# Patient Record
Sex: FEMALE | Race: WHITE | Employment: FULL TIME | URBAN - METROPOLITAN AREA
[De-identification: names, ages, dates, MRNs, and addresses within clinical notes are randomized per-mention and may not be internally consistent; named-entity substitution may affect disease eponyms.]

---

## 2017-02-22 ENCOUNTER — TELEPHONE (OUTPATIENT)
Dept: OBGYN CLINIC | Facility: CLINIC | Age: 32
End: 2017-02-22

## 2017-02-22 NOTE — TELEPHONE ENCOUNTER
Pt confirms message below and LMP of 1/19/17. Pt stated she is not currently taking PNV and pt was instructed to get PNV with [facility]. Pt informed our office has 6 doctors (2 male, 4 female) and pt will see all of them throughout PN care.  Pt verbalized underst

## 2017-03-07 ENCOUNTER — TELEPHONE (OUTPATIENT)
Dept: OBGYN CLINIC | Facility: CLINIC | Age: 32
End: 2017-03-07

## 2017-03-07 NOTE — TELEPHONE ENCOUNTER
Pt has appts for dental work coming up. Letter for dental work in pregnancy sent to pt via [address] Pt verbalizes understanding.

## 2017-03-18 ENCOUNTER — LAB ENCOUNTER (OUTPATIENT)
Dept: LAB | Facility: HOSPITAL | Age: 32
End: 2017-03-18
Attending: OBSTETRICS & GYNECOLOGY
Payer: COMMERCIAL

## 2017-03-18 ENCOUNTER — NURSE ONLY (OUTPATIENT)
Dept: OBGYN CLINIC | Facility: CLINIC | Age: 32
End: 2017-03-18

## 2017-03-18 ENCOUNTER — TELEPHONE (OUTPATIENT)
Dept: OBGYN CLINIC | Facility: CLINIC | Age: 32
End: 2017-03-18

## 2017-03-18 VITALS — HEIGHT: 67.5 IN | BODY MASS INDEX: 29.63 KG/M2 | WEIGHT: 191 LBS

## 2017-03-18 DIAGNOSIS — Z34.81 ENCOUNTER FOR SUPERVISION OF OTHER NORMAL PREGNANCY IN FIRST TRIMESTER: ICD-10-CM

## 2017-03-18 DIAGNOSIS — Z34.81 ENCOUNTER FOR SUPERVISION OF OTHER NORMAL PREGNANCY IN FIRST TRIMESTER: Primary | ICD-10-CM

## 2017-03-18 LAB
ANTIBODY SCREEN: NEGATIVE
BASOPHILS # BLD: 0 K/UL (ref 0–0.2)
BASOPHILS NFR BLD: 1 %
CONTROL LINE PRESENT WITH A CLEAR BACKGROUND (YES/NO): YES YES/NO
EOSINOPHIL # BLD: 0.1 K/UL (ref 0–0.7)
EOSINOPHIL NFR BLD: 1 %
ERYTHROCYTE [DISTWIDTH] IN BLOOD BY AUTOMATED COUNT: 12.5 % (ref 11–15)
HCT VFR BLD AUTO: 37.3 % (ref 35–48)
HGB BLD-MCNC: 12.5 G/DL (ref 12–16)
KIT EXPIRATION DATE: NORMAL DATE
KIT LOT #: NORMAL NUMERIC
LYMPHOCYTES # BLD: 1.7 K/UL (ref 1–4)
LYMPHOCYTES NFR BLD: 27 %
MCH RBC QN AUTO: 30.9 PG (ref 27–32)
MCHC RBC AUTO-ENTMCNC: 33.6 G/DL (ref 32–37)
MCV RBC AUTO: 92 FL (ref 80–100)
MONOCYTES # BLD: 0.6 K/UL (ref 0–1)
MONOCYTES NFR BLD: 10 %
NEUTROPHILS # BLD AUTO: 3.9 K/UL (ref 1.8–7.7)
NEUTROPHILS NFR BLD: 61 %
PLATELET # BLD AUTO: 214 K/UL (ref 140–400)
PMV BLD AUTO: 9.2 FL (ref 7.4–10.3)
PREGNANCY TEST, URINE: POSITIVE
RBC # BLD AUTO: 4.06 M/UL (ref 3.7–5.4)
RH BLOOD TYPE: POSITIVE
RUBV IGG SER-ACNC: 12.2 IU/ML
WBC # BLD AUTO: 6.4 K/UL (ref 4–11)

## 2017-03-18 PROCEDURE — 86900 BLOOD TYPING SEROLOGIC ABO: CPT

## 2017-03-18 PROCEDURE — 86803 HEPATITIS C AB TEST: CPT

## 2017-03-18 PROCEDURE — 86780 TREPONEMA PALLIDUM: CPT

## 2017-03-18 PROCEDURE — 81025 URINE PREGNANCY TEST: CPT | Performed by: OBSTETRICS & GYNECOLOGY

## 2017-03-18 PROCEDURE — 87340 HEPATITIS B SURFACE AG IA: CPT

## 2017-03-18 PROCEDURE — 87086 URINE CULTURE/COLONY COUNT: CPT

## 2017-03-18 PROCEDURE — 86762 RUBELLA ANTIBODY: CPT

## 2017-03-18 PROCEDURE — 87389 HIV-1 AG W/HIV-1&-2 AB AG IA: CPT

## 2017-03-18 PROCEDURE — 85025 COMPLETE CBC W/AUTO DIFF WBC: CPT

## 2017-03-18 PROCEDURE — 86850 RBC ANTIBODY SCREEN: CPT

## 2017-03-18 PROCEDURE — 86901 BLOOD TYPING SEROLOGIC RH(D): CPT

## 2017-03-18 PROCEDURE — 36415 COLL VENOUS BLD VENIPUNCTURE: CPT

## 2017-03-18 RX ORDER — ACETAMINOPHEN 160 MG
2000 TABLET,DISINTEGRATING ORAL DAILY
COMMUNITY
End: 2017-09-18 | Stop reason: ALTCHOICE

## 2017-03-18 NOTE — PROGRESS NOTES
Pt seen for OBN appt today with no complaints. Normal PN labs ordered and Hep C. Pt advised all labs must be completed and resulted prior to MD appt. Pt walked to  to schedule NPN appt with MD.  Pt did upt in the office and it was positive. or baby's father had a child with birth defects not listed above No    Previous miscarriages or stillborn  Pt's mother had miscarriages,  Not sure of the number

## 2017-03-18 NOTE — TELEPHONE ENCOUNTER
Pt is currently taking Vitamin D3 2000 units daily. Pt wants to know if she can continue taking it. Pt is also taking a pnv.   Sent to MD on Call, JONH

## 2017-03-20 ENCOUNTER — TELEPHONE (OUTPATIENT)
Dept: OBGYN CLINIC | Facility: CLINIC | Age: 32
End: 2017-03-20

## 2017-03-20 ENCOUNTER — INITIAL PRENATAL (OUTPATIENT)
Dept: OBGYN CLINIC | Facility: CLINIC | Age: 32
End: 2017-03-20

## 2017-03-20 VITALS
BODY MASS INDEX: 30 KG/M2 | DIASTOLIC BLOOD PRESSURE: 64 MMHG | HEART RATE: 68 BPM | SYSTOLIC BLOOD PRESSURE: 105 MMHG | WEIGHT: 192 LBS

## 2017-03-20 DIAGNOSIS — Z34.91 ENCOUNTER FOR SUPERVISION OF NORMAL PREGNANCY IN FIRST TRIMESTER, UNSPECIFIED GRAVIDITY: Primary | ICD-10-CM

## 2017-03-20 DIAGNOSIS — O34.41 HISTORY OF LEEP (LOOP ELECTROSURGICAL EXCISION PROCEDURE) OF CERVIX COMPLICATING PREGNANCY, FIRST TRIMESTER: ICD-10-CM

## 2017-03-20 DIAGNOSIS — Z12.4 SCREENING FOR MALIGNANT NEOPLASM OF CERVIX: ICD-10-CM

## 2017-03-20 DIAGNOSIS — Z98.890 HISTORY OF LEEP (LOOP ELECTROSURGICAL EXCISION PROCEDURE) OF CERVIX COMPLICATING PREGNANCY, FIRST TRIMESTER: ICD-10-CM

## 2017-03-20 DIAGNOSIS — Z36.9 FIRST TRIMESTER SCREENING: Primary | ICD-10-CM

## 2017-03-20 PROBLEM — Z87.891 FORMER SMOKER: Status: ACTIVE | Noted: 2017-03-20

## 2017-03-20 PROBLEM — Z28.39 RUBELLA NON-IMMUNE STATUS, ANTEPARTUM (HCC): Status: ACTIVE | Noted: 2017-03-20

## 2017-03-20 PROBLEM — Z13.79 GENETIC TESTING: Status: ACTIVE | Noted: 2017-03-20

## 2017-03-20 PROBLEM — Z28.3 RUBELLA NON-IMMUNE STATUS, ANTEPARTUM: Status: ACTIVE | Noted: 2017-03-20

## 2017-03-20 PROBLEM — Z28.39 RUBELLA NON-IMMUNE STATUS, ANTEPARTUM: Status: ACTIVE | Noted: 2017-03-20

## 2017-03-20 PROBLEM — O99.891 RUBELLA NON-IMMUNE STATUS, ANTEPARTUM: Status: ACTIVE | Noted: 2017-03-20

## 2017-03-20 PROBLEM — O09.899 RUBELLA NON-IMMUNE STATUS, ANTEPARTUM (HCC): Status: ACTIVE | Noted: 2017-03-20

## 2017-03-20 PROBLEM — O09.899 RUBELLA NON-IMMUNE STATUS, ANTEPARTUM: Status: ACTIVE | Noted: 2017-03-20

## 2017-03-20 LAB
HBV SURFACE AG SERPL QL IA: NONREACTIVE
HCV AB SERPL QL IA: NONREACTIVE
HIV1+2 AB SERPL QL IA: NONREACTIVE
MULTISTIX LOT#: NORMAL NUMERIC
PH, URINE: 6.5 (ref 4.5–8)
SPECIFIC GRAVITY: 1.01 (ref 1–1.03)
T PALLIDUM AB SER QL: NEGATIVE
UROBILINOGEN,SEMI-QN: 0.2 MG/DL (ref 0–1.9)

## 2017-03-20 NOTE — PROGRESS NOTES
Pt is a 31 yo  @ 8w4d by [facility] who presents for NOB visit. History taken and Problem List updated. History of LEEP in  for CIS. Subsequent pap wnl. PE performed and wnl. Pap, GC, CT, Trich collected.  Stopped smoking cigs once she found out sh

## 2017-03-20 NOTE — TELEPHONE ENCOUNTER
PT NOTIFIED THE REQUEST HAS BEEN FAXED TO [facility] AND PHONE NUMBER [phone] TO SCHEDULE HER APPTS. REFERRAL IN PROCESS.

## 2017-03-21 ENCOUNTER — TELEPHONE (OUTPATIENT)
Dept: PERINATAL CARE | Facility: HOSPITAL | Age: 32
End: 2017-03-21

## 2017-03-22 LAB
C TRACH DNA SPEC QL NAA+PROBE: NEGATIVE
HPV I/H RISK 1 DNA SPEC QL NAA+PROBE: NEGATIVE
N GONORRHOEA DNA SPEC QL NAA+PROBE: NEGATIVE
T VAGINALIS RRNA SPEC QL NAA+PROBE: NEGATIVE

## 2017-04-17 ENCOUNTER — ROUTINE PRENATAL (OUTPATIENT)
Dept: OBGYN CLINIC | Facility: CLINIC | Age: 32
End: 2017-04-17

## 2017-04-17 VITALS
HEART RATE: 69 BPM | SYSTOLIC BLOOD PRESSURE: 125 MMHG | BODY MASS INDEX: 30 KG/M2 | DIASTOLIC BLOOD PRESSURE: 76 MMHG | WEIGHT: 194 LBS

## 2017-04-17 DIAGNOSIS — O34.42 HX LEEP (LOOP ELECTROSURGICAL EXCISION PROCEDURE), CERVIX, PREGNANCY, SECOND TRIMESTER: ICD-10-CM

## 2017-04-17 DIAGNOSIS — Z34.91 ENCOUNTER FOR SUPERVISION OF NORMAL PREGNANCY IN FIRST TRIMESTER, UNSPECIFIED GRAVIDITY: Primary | ICD-10-CM

## 2017-04-17 DIAGNOSIS — Z98.890 HX LEEP (LOOP ELECTROSURGICAL EXCISION PROCEDURE), CERVIX, PREGNANCY, SECOND TRIMESTER: ICD-10-CM

## 2017-04-24 ENCOUNTER — HOSPITAL ENCOUNTER (OUTPATIENT)
Dept: PERINATAL CARE | Facility: HOSPITAL | Age: 32
Discharge: HOME | End: 2017-04-24
Attending: OBSTETRICS & GYNECOLOGY
Payer: COMMERCIAL

## 2017-04-24 VITALS
BODY MASS INDEX: 30.45 KG/M2 | HEART RATE: 68 BPM | DIASTOLIC BLOOD PRESSURE: 71 MMHG | HEIGHT: 67 IN | RESPIRATION RATE: 16 BRPM | WEIGHT: 194 LBS | SYSTOLIC BLOOD PRESSURE: 127 MMHG

## 2017-04-24 DIAGNOSIS — D25.2 SUBSEROUS LEIOMYOMA OF UTERUS: ICD-10-CM

## 2017-04-24 DIAGNOSIS — Z36.9 FIRST TRIMESTER SCREENING: Primary | ICD-10-CM

## 2017-04-24 DIAGNOSIS — Z98.890 H/O LEEP: ICD-10-CM

## 2017-04-24 DIAGNOSIS — Z36.9 FIRST TRIMESTER SCREENING: ICD-10-CM

## 2017-04-24 PROCEDURE — 76813 OB US NUCHAL MEAS 1 GEST: CPT | Performed by: OBSTETRICS & GYNECOLOGY

## 2017-04-24 PROCEDURE — 76813 OB US NUCHAL MEAS 1 GEST: CPT

## 2017-04-24 PROCEDURE — 99242 OFF/OP CONSLTJ NEW/EST SF 20: CPT | Performed by: OBSTETRICS & GYNECOLOGY

## 2017-04-24 NOTE — PROGRESS NOTES
[facility] first trimester screen and ultrasound  Lot # [id number] for lab specimen  Patient feeling well, no complaints offered

## 2017-04-24 NOTE — PROGRESS NOTES
Outpatient Maternal-Fetal Medicine Consultation    Dear Dr. Chase Krueger,    Thank you for requesting ultrasound evaluation and maternal fetal medicine consultation on your patient Aurea Primrose.   As you are aware she is a 32year old female with a [place] ABSOB, RUBIGGOB, OBHBSAG, RPROB, HIV, GBS    OBSTETRIC ULTRASOUND  A first trimester ultrasound was performed prior to the consultation which I interpreted and discussed with the patient and her , Maciej Mann.   The CRL is consistent with dates and the NT length of 25 mm or less in the second trimester is associated with a higher risk for  delivery and the shorter the cervix the higher the risk.   Transvaginal ultrasound cervical length assessment is the best method for assessing cervical length, alth M.D.    The majority of the time (>50%) was spent in review of records, consultation and coordination of care. Our discussion is summarized above. The approximate physician face-to-face time was 35 minutes.

## 2017-04-26 ENCOUNTER — TELEPHONE (OUTPATIENT)
Dept: PERINATAL CARE | Facility: HOSPITAL | Age: 32
End: 2017-04-26

## 2017-04-26 NOTE — TELEPHONE ENCOUNTER
Pt 1st trimester screen results obt  Reviewed By MD Traci Bill  Low risk for trisomy 18/13/21  Less than 1 in 91647 risk for trisomy 18/13/21  lmovm per pt consent    Report sent for scanning into pt record

## 2017-05-22 ENCOUNTER — ROUTINE PRENATAL (OUTPATIENT)
Dept: OBGYN CLINIC | Facility: CLINIC | Age: 32
End: 2017-05-22

## 2017-05-22 ENCOUNTER — HOSPITAL ENCOUNTER (OUTPATIENT)
Dept: PERINATAL CARE | Facility: HOSPITAL | Age: 32
Discharge: HOME | End: 2017-05-22
Attending: OBSTETRICS & GYNECOLOGY
Payer: COMMERCIAL

## 2017-05-22 VITALS
DIASTOLIC BLOOD PRESSURE: 75 MMHG | WEIGHT: 195 LBS | RESPIRATION RATE: 16 BRPM | SYSTOLIC BLOOD PRESSURE: 118 MMHG | BODY MASS INDEX: 31 KG/M2 | HEART RATE: 71 BPM

## 2017-05-22 VITALS
HEART RATE: 68 BPM | DIASTOLIC BLOOD PRESSURE: 73 MMHG | BODY MASS INDEX: 31 KG/M2 | WEIGHT: 200 LBS | SYSTOLIC BLOOD PRESSURE: 119 MMHG

## 2017-05-22 DIAGNOSIS — Z34.02 ENCOUNTER FOR SUPERVISION OF NORMAL FIRST PREGNANCY IN SECOND TRIMESTER: Primary | ICD-10-CM

## 2017-05-22 DIAGNOSIS — Z98.890 H/O LEEP (LOOP ELECTROSURGICAL EXCISION PROCEDURE) OF CERVIX COMPLICATING PREGNANCY, SECOND TRIMESTER: ICD-10-CM

## 2017-05-22 DIAGNOSIS — O34.42 H/O LEEP (LOOP ELECTROSURGICAL EXCISION PROCEDURE) OF CERVIX COMPLICATING PREGNANCY, SECOND TRIMESTER: ICD-10-CM

## 2017-05-22 DIAGNOSIS — O34.42 H/O LEEP (LOOP ELECTROSURGICAL EXCISION PROCEDURE) OF CERVIX COMPLICATING PREGNANCY, SECOND TRIMESTER: Primary | ICD-10-CM

## 2017-05-22 DIAGNOSIS — Z98.890 H/O LEEP (LOOP ELECTROSURGICAL EXCISION PROCEDURE) OF CERVIX COMPLICATING PREGNANCY, SECOND TRIMESTER: Primary | ICD-10-CM

## 2017-05-22 PROCEDURE — 76815 OB US LIMITED FETUS(S): CPT | Performed by: OBSTETRICS & GYNECOLOGY

## 2017-05-22 PROCEDURE — 76817 TRANSVAGINAL US OBSTETRIC: CPT | Performed by: OBSTETRICS & GYNECOLOGY

## 2017-05-22 PROCEDURE — 99212 OFFICE O/P EST SF 10 MIN: CPT | Performed by: OBSTETRICS & GYNECOLOGY

## 2017-05-22 NOTE — PROGRESS NOTES
Outpatient Maternal-Fetal Medicine Consultation    Dear Dr. Patel Hernandez,    Thank you for requesting ultrasound evaluation and maternal fetal medicine consultation on your patient Ld Davison As you are aware she is a 32year old female with a [place]    I informed her of the fibroid.  Fibroids can increase in size during pregnancy and could lead to abdominal pain with an increased risk of  labor.  Most cases are unremarkable.  Fibroids can obstruct the pelvis and increase the risk for a C-sectio their satisfaction.       IMPRESSION:  · IUP at 17w4d  · Normal cervical length ultrasound  · Small left lateral subserosal fibroid (2 cm), unchanged  · H/o LEEP in 2015    RECOMMENDATIONS:  · Continue care with Dr. Liz Hunter  · Repeat transvaginal CL at ~2

## 2017-05-22 NOTE — ADDENDUM NOTE
Encounter addended by: Marcelino Hutson on: 5/22/2017 11:06 AM<BR>     Documentation filed: Charges VN

## 2017-06-12 ENCOUNTER — HOSPITAL ENCOUNTER (OUTPATIENT)
Dept: PERINATAL CARE | Facility: HOSPITAL | Age: 32
Discharge: HOME | End: 2017-06-12
Attending: OBSTETRICS & GYNECOLOGY
Payer: COMMERCIAL

## 2017-06-12 VITALS — SYSTOLIC BLOOD PRESSURE: 138 MMHG | DIASTOLIC BLOOD PRESSURE: 74 MMHG | HEART RATE: 81 BPM

## 2017-06-12 DIAGNOSIS — Z98.890 H/O LEEP: Primary | ICD-10-CM

## 2017-06-12 DIAGNOSIS — Z36.3 SCREENING, ANTENATAL, FOR MALFORMATION BY ULTRASOUND: ICD-10-CM

## 2017-06-12 DIAGNOSIS — D25.2 SUBSEROUS LEIOMYOMA OF UTERUS: ICD-10-CM

## 2017-06-12 DIAGNOSIS — Z98.890 H/O LEEP: ICD-10-CM

## 2017-06-12 PROCEDURE — 76811 OB US DETAILED SNGL FETUS: CPT | Performed by: OBSTETRICS & GYNECOLOGY

## 2017-06-12 PROCEDURE — 76805 OB US >/= 14 WKS SNGL FETUS: CPT | Performed by: OBSTETRICS & GYNECOLOGY

## 2017-06-12 PROCEDURE — 76817 TRANSVAGINAL US OBSTETRIC: CPT | Performed by: OBSTETRICS & GYNECOLOGY

## 2017-06-12 PROCEDURE — 99212 OFFICE O/P EST SF 10 MIN: CPT | Performed by: OBSTETRICS & GYNECOLOGY

## 2017-06-12 NOTE — PROGRESS NOTES
Outpatient Maternal-Fetal Medicine Consultation    Dear Dr. Hayden Montano,    Thank you for requesting ultrasound evaluation and maternal fetal medicine consultation on your patient Theresa Rich As you are aware she is a 32year old female with a [place] present. Choroid plexus cyst absent. Summary of Ultrasound findings: This is a [place] pregnancy   The fetal measurements are consistent with established EDC. No gross ultrasound evidence of structural abnormalities are seen today.  No minor markers

## 2017-06-20 ENCOUNTER — ROUTINE PRENATAL (OUTPATIENT)
Dept: OBGYN CLINIC | Facility: CLINIC | Age: 32
End: 2017-06-20

## 2017-06-20 VITALS
HEART RATE: 69 BPM | DIASTOLIC BLOOD PRESSURE: 70 MMHG | SYSTOLIC BLOOD PRESSURE: 114 MMHG | BODY MASS INDEX: 32 KG/M2 | WEIGHT: 202 LBS

## 2017-06-20 DIAGNOSIS — Z34.92 ENCOUNTER FOR SUPERVISION OF NORMAL PREGNANCY IN SECOND TRIMESTER, UNSPECIFIED GRAVIDITY: Primary | ICD-10-CM

## 2017-07-19 ENCOUNTER — TELEPHONE (OUTPATIENT)
Dept: RHEUMATOLOGY | Facility: CLINIC | Age: 32
End: 2017-07-19

## 2017-07-19 ENCOUNTER — ROUTINE PRENATAL (OUTPATIENT)
Dept: OBGYN CLINIC | Facility: CLINIC | Age: 32
End: 2017-07-19

## 2017-07-19 VITALS
DIASTOLIC BLOOD PRESSURE: 70 MMHG | SYSTOLIC BLOOD PRESSURE: 106 MMHG | BODY MASS INDEX: 33 KG/M2 | WEIGHT: 209 LBS | HEART RATE: 71 BPM

## 2017-07-19 DIAGNOSIS — L98.9 SKIN LESIONS: Primary | ICD-10-CM

## 2017-07-19 DIAGNOSIS — Z34.02 ENCOUNTER FOR SUPERVISION OF NORMAL FIRST PREGNANCY IN SECOND TRIMESTER: Primary | ICD-10-CM

## 2017-07-19 LAB
APPEARANCE: CLEAR
MULTISTIX LOT#: NORMAL NUMERIC
URINE-COLOR: YELLOW

## 2017-07-19 NOTE — TELEPHONE ENCOUNTER
Pt stopped on to [facility] asking for a referral to see a Dermatologist. Please advise

## 2017-07-20 ENCOUNTER — ROUTINE PRENATAL (OUTPATIENT)
Dept: OBGYN CLINIC | Facility: CLINIC | Age: 32
End: 2017-07-20

## 2017-07-20 ENCOUNTER — HOSPITAL ENCOUNTER (OUTPATIENT)
Facility: HOSPITAL | Age: 32
Setting detail: OBSERVATION
Discharge: HOME | End: 2017-07-20
Attending: OBSTETRICS & GYNECOLOGY | Admitting: OBSTETRICS & GYNECOLOGY
Payer: COMMERCIAL

## 2017-07-20 ENCOUNTER — TELEPHONE (OUTPATIENT)
Dept: OBGYN CLINIC | Facility: CLINIC | Age: 32
End: 2017-07-20

## 2017-07-20 VITALS
HEART RATE: 73 BPM | DIASTOLIC BLOOD PRESSURE: 73 MMHG | SYSTOLIC BLOOD PRESSURE: 109 MMHG | BODY MASS INDEX: 33 KG/M2 | WEIGHT: 208.19 LBS

## 2017-07-20 VITALS — DIASTOLIC BLOOD PRESSURE: 71 MMHG | SYSTOLIC BLOOD PRESSURE: 115 MMHG | HEART RATE: 74 BPM

## 2017-07-20 DIAGNOSIS — Z34.92 ENCOUNTER FOR SUPERVISION OF NORMAL PREGNANCY IN SECOND TRIMESTER, UNSPECIFIED GRAVIDITY: Primary | ICD-10-CM

## 2017-07-20 PROBLEM — O47.9 IRREGULAR CONTRACTIONS (HCC): Status: ACTIVE | Noted: 2017-07-20

## 2017-07-20 PROBLEM — O47.9 IRREGULAR CONTRACTIONS: Status: ACTIVE | Noted: 2017-07-20

## 2017-07-20 LAB
BILIRUB UR QL: NEGATIVE
COLOR UR: YELLOW
FIBRONECTIN FETAL SPEC QL: NEGATIVE
GLUCOSE UR-MCNC: NEGATIVE MG/DL
HGB UR QL STRIP.AUTO: NEGATIVE
KETONES UR-MCNC: NEGATIVE MG/DL
MULTISTIX LOT#: NORMAL NUMERIC
NITRITE UR QL STRIP.AUTO: NEGATIVE
PH UR: 5 [PH] (ref 5–8)
PH, URINE: 6 (ref 4.5–8)
PROT UR-MCNC: NEGATIVE MG/DL
RBC #/AREA URNS AUTO: 2 /HPF
SP GR UR STRIP: 1.01 (ref 1–1.03)
SPECIFIC GRAVITY: 1.01 (ref 1–1.03)
UROBILINOGEN UR STRIP-ACNC: <2
UROBILINOGEN,SEMI-QN: 0.2 MG/DL (ref 0–1.9)
VIT C UR-MCNC: NEGATIVE MG/DL
WBC #/AREA URNS AUTO: 12 /HPF

## 2017-07-20 PROCEDURE — 59025 FETAL NON-STRESS TEST: CPT | Performed by: OBSTETRICS & GYNECOLOGY

## 2017-07-20 RX ORDER — NITROFURANTOIN 25; 75 MG/1; MG/1
100 CAPSULE ORAL 2 TIMES DAILY
Qty: 14 CAPSULE | Refills: 0 | Status: SHIPPED | OUTPATIENT
Start: 2017-07-20 | End: 2017-07-27

## 2017-07-20 NOTE — TELEPHONE ENCOUNTER
C/O LOWER ABDOMINAL INTERMITTENT CRAMPING THAT MAKES HER LOW BACK HURT. DESCRIBES IT AS LOWER LEFT ABDOMINAL TIGHTENING/PULSING SENSATION. SYMPTOMS FOR 3 DAYS. HAS BEEN DRINKING A LOT OF WATER AND DOING SOME LOW BACK STRETCHES. PT CONCERNED.   OFFERED A

## 2017-07-20 NOTE — TRIAGE
[facility]      Triage Note    Soha Mckeon Patient Status:  Observation    1985 MRN [id number]   Location [address]  Attending Melina Montaño MD   Hosp Day # 0 PCP MD Andres Galvez Ask gestational age                      Additional Comments       Reason for visit: Sent from office with c/o uterine cramping the past 2-3 days. UA, C&S, FFN done. FFN negative. UA indicative of possible UTI. Dr Foster Parker informed of EFM tracing, FFN, UA results.

## 2017-07-20 NOTE — PROGRESS NOTES
Problem Visit--Pt c/o persistent contractions x 3 days. Feeling them throughout the day and having low back pain and pressure. FFN collected via spec exam.  Cx 0/50/-3 however has had a LEEP. To [facility] to r/o PTL. On call notified.

## 2017-07-29 ENCOUNTER — APPOINTMENT (OUTPATIENT)
Dept: LAB | Age: 32
End: 2017-07-29
Attending: OBSTETRICS & GYNECOLOGY
Payer: COMMERCIAL

## 2017-07-29 DIAGNOSIS — Z34.02 ENCOUNTER FOR SUPERVISION OF NORMAL FIRST PREGNANCY IN SECOND TRIMESTER: ICD-10-CM

## 2017-07-29 LAB
ERYTHROCYTE [DISTWIDTH] IN BLOOD BY AUTOMATED COUNT: 13.7 % (ref 11–15)
GLUCOSE 1H P 50 G GLC PO SERPL-MCNC: 88 MG/DL
HCT VFR BLD AUTO: 34.8 % (ref 35–48)
HGB BLD-MCNC: 11.6 G/DL (ref 12–16)
MCH RBC QN AUTO: 30.8 PG (ref 27–32)
MCHC RBC AUTO-ENTMCNC: 33.3 G/DL (ref 32–37)
MCV RBC AUTO: 92.4 FL (ref 80–100)
PLATELET # BLD AUTO: 212 K/UL (ref 140–400)
PMV BLD AUTO: 10 FL (ref 7.4–10.3)
RBC # BLD AUTO: 3.76 M/UL (ref 3.7–5.4)
WBC # BLD AUTO: 8.6 K/UL (ref 4–11)

## 2017-07-29 PROCEDURE — 82950 GLUCOSE TEST: CPT

## 2017-07-29 PROCEDURE — 36415 COLL VENOUS BLD VENIPUNCTURE: CPT

## 2017-07-29 PROCEDURE — 85027 COMPLETE CBC AUTOMATED: CPT

## 2017-07-31 ENCOUNTER — TELEPHONE (OUTPATIENT)
Dept: OBGYN CLINIC | Facility: CLINIC | Age: 32
End: 2017-07-31

## 2017-08-08 ENCOUNTER — ROUTINE PRENATAL (OUTPATIENT)
Dept: OBGYN CLINIC | Facility: CLINIC | Age: 32
End: 2017-08-08

## 2017-08-08 VITALS
SYSTOLIC BLOOD PRESSURE: 111 MMHG | HEART RATE: 83 BPM | BODY MASS INDEX: 33 KG/M2 | DIASTOLIC BLOOD PRESSURE: 69 MMHG | WEIGHT: 213 LBS

## 2017-08-08 DIAGNOSIS — Z34.93 ENCOUNTER FOR SUPERVISION OF NORMAL PREGNANCY IN THIRD TRIMESTER, UNSPECIFIED GRAVIDITY: Primary | ICD-10-CM

## 2017-08-08 LAB
MULTISTIX LOT#: NORMAL NUMERIC
PH, URINE: 6 (ref 4.5–8)
SPECIFIC GRAVITY: 1.02 (ref 1–1.03)

## 2017-08-08 PROCEDURE — 90471 IMMUNIZATION ADMIN: CPT | Performed by: OBSTETRICS & GYNECOLOGY

## 2017-08-08 PROCEDURE — 90715 TDAP VACCINE 7 YRS/> IM: CPT | Performed by: OBSTETRICS & GYNECOLOGY

## 2017-08-08 NOTE — PROGRESS NOTES
Bairon Roper at visit. No S/S of PTL. TDAP today. Trouble scheduling tour by phone or web site. We did it with Tyrell Rock over the phone.

## 2017-08-23 ENCOUNTER — ROUTINE PRENATAL (OUTPATIENT)
Dept: OBGYN CLINIC | Facility: CLINIC | Age: 32
End: 2017-08-23

## 2017-08-23 VITALS
WEIGHT: 217 LBS | DIASTOLIC BLOOD PRESSURE: 74 MMHG | SYSTOLIC BLOOD PRESSURE: 110 MMHG | HEART RATE: 80 BPM | BODY MASS INDEX: 34 KG/M2

## 2017-08-23 DIAGNOSIS — Z34.93 ENCOUNTER FOR SUPERVISION OF NORMAL PREGNANCY IN THIRD TRIMESTER, UNSPECIFIED GRAVIDITY: Primary | ICD-10-CM

## 2017-08-23 LAB
MULTISTIX LOT#: NORMAL NUMERIC
PH, URINE: 5 (ref 4.5–8)
SPECIFIC GRAVITY: 1.02 (ref 1–1.03)

## 2017-09-07 ENCOUNTER — ROUTINE PRENATAL (OUTPATIENT)
Dept: OBGYN CLINIC | Facility: CLINIC | Age: 32
End: 2017-09-07

## 2017-09-07 VITALS
WEIGHT: 218.81 LBS | SYSTOLIC BLOOD PRESSURE: 117 MMHG | BODY MASS INDEX: 34 KG/M2 | DIASTOLIC BLOOD PRESSURE: 80 MMHG | HEART RATE: 74 BPM

## 2017-09-07 DIAGNOSIS — Z34.93 ENCOUNTER FOR SUPERVISION OF NORMAL PREGNANCY IN THIRD TRIMESTER, UNSPECIFIED GRAVIDITY: Primary | ICD-10-CM

## 2017-09-07 LAB
MULTISTIX LOT#: NORMAL NUMERIC
PH, URINE: 6 (ref 4.5–8)
SPECIFIC GRAVITY: 1.01 (ref 1–1.03)
UROBILINOGEN,SEMI-QN: 0 MG/DL (ref 0–1.9)

## 2017-09-16 ENCOUNTER — TELEPHONE (OUTPATIENT)
Dept: OBGYN CLINIC | Facility: CLINIC | Age: 32
End: 2017-09-16

## 2017-09-16 NOTE — TELEPHONE ENCOUNTER
Pt is 34w2d, calling to report a sore throat and HA that started Wednesday night. Pt states she felt warm Thursday but had no thermometer. She bought one and took her temp today which was 97.9F. Pt denies any other symptoms.  Pt reports +FM and denies LOF a

## 2017-09-18 ENCOUNTER — OFFICE VISIT (OUTPATIENT)
Dept: INTERNAL MEDICINE CLINIC | Facility: CLINIC | Age: 32
End: 2017-09-18

## 2017-09-18 VITALS
DIASTOLIC BLOOD PRESSURE: 80 MMHG | TEMPERATURE: 98 F | SYSTOLIC BLOOD PRESSURE: 124 MMHG | WEIGHT: 219 LBS | BODY MASS INDEX: 33.97 KG/M2 | OXYGEN SATURATION: 97 % | HEART RATE: 83 BPM | HEIGHT: 67.5 IN

## 2017-09-18 DIAGNOSIS — J06.9 ACUTE URI: Primary | ICD-10-CM

## 2017-09-18 LAB
CONTROL LINE PRESENT WITH A CLEAR BACKGROUND (YES/NO): YES YES/NO
KIT LOT #: NORMAL NUMERIC
STREP GRP A CUL-SCR: NEGATIVE

## 2017-09-18 PROCEDURE — 99213 OFFICE O/P EST LOW 20 MIN: CPT | Performed by: INTERNAL MEDICINE

## 2017-09-18 PROCEDURE — 99212 OFFICE O/P EST SF 10 MIN: CPT | Performed by: INTERNAL MEDICINE

## 2017-09-18 PROCEDURE — 87880 STREP A ASSAY W/OPTIC: CPT | Performed by: INTERNAL MEDICINE

## 2017-09-18 NOTE — PROGRESS NOTES
Tamica Lara is a 32year old female. Patient presents with:  Sinus Problem  Cough    HPI:   Since Wednesday evening, 5 days ago, she has had persistent symptoms of bilateral ear pressure and sore throat.   In the past day, she also has developed a produ negative    ASSESSMENT AND PLAN:   1. Acute URI  - STREP A ASSAY W/OPTIC    Likely viral.  Symptomatic treatment-Tylenol, saline gargles, Robitussin as needed. Call if not soon better. Would then prescribe an empiric course of antibiotics.     The patient

## 2017-09-18 NOTE — PATIENT INSTRUCTIONS
Your Strep test today was negative. Please treat symptoms with Tylenol and warm salt water gargles as needed. Call if not soon better.

## 2017-09-21 ENCOUNTER — TELEPHONE (OUTPATIENT)
Dept: OBGYN CLINIC | Facility: CLINIC | Age: 32
End: 2017-09-21

## 2017-09-21 ENCOUNTER — ROUTINE PRENATAL (OUTPATIENT)
Dept: OBGYN CLINIC | Facility: CLINIC | Age: 32
End: 2017-09-21

## 2017-09-21 VITALS
SYSTOLIC BLOOD PRESSURE: 125 MMHG | WEIGHT: 220.63 LBS | BODY MASS INDEX: 34 KG/M2 | HEART RATE: 98 BPM | DIASTOLIC BLOOD PRESSURE: 76 MMHG

## 2017-09-21 DIAGNOSIS — Z34.93 ENCOUNTER FOR SUPERVISION OF NORMAL PREGNANCY IN THIRD TRIMESTER, UNSPECIFIED GRAVIDITY: Primary | ICD-10-CM

## 2017-09-21 LAB
MULTISTIX LOT#: NORMAL NUMERIC
PH, URINE: 5 (ref 4.5–8)
SPECIFIC GRAVITY: 1.01 (ref 1–1.03)
UROBILINOGEN,SEMI-QN: 0.2 MG/DL (ref 0–1.9)

## 2017-09-22 NOTE — TELEPHONE ENCOUNTER
PT NOTIFIED REFERRAL IS IN PROCESS AND ONCE APPROVED WILL GET A COPY IN MY CHART. PT AWARE THEY GENERALLY SEND US AN ORDER THAT WE COMPLETE AND FAX BACK TO THEM.

## 2017-10-04 NOTE — TELEPHONE ENCOUNTER
Approved [facility] [place]. Patient can call [phone] to follow up on delivery status.

## 2017-10-05 ENCOUNTER — ROUTINE PRENATAL (OUTPATIENT)
Dept: OBGYN CLINIC | Facility: CLINIC | Age: 32
End: 2017-10-05

## 2017-10-05 ENCOUNTER — APPOINTMENT (OUTPATIENT)
Dept: LAB | Facility: HOSPITAL | Age: 32
End: 2017-10-05
Attending: OBSTETRICS & GYNECOLOGY
Payer: COMMERCIAL

## 2017-10-05 ENCOUNTER — TELEPHONE (OUTPATIENT)
Dept: OBGYN CLINIC | Facility: CLINIC | Age: 32
End: 2017-10-05

## 2017-10-05 VITALS
WEIGHT: 222.81 LBS | BODY MASS INDEX: 34 KG/M2 | SYSTOLIC BLOOD PRESSURE: 138 MMHG | DIASTOLIC BLOOD PRESSURE: 79 MMHG | HEART RATE: 83 BPM

## 2017-10-05 DIAGNOSIS — Z34.93 ENCOUNTER FOR SUPERVISION OF NORMAL PREGNANCY IN THIRD TRIMESTER, UNSPECIFIED GRAVIDITY: Primary | ICD-10-CM

## 2017-10-05 DIAGNOSIS — Z34.93 ENCOUNTER FOR SUPERVISION OF NORMAL PREGNANCY IN THIRD TRIMESTER, UNSPECIFIED GRAVIDITY: ICD-10-CM

## 2017-10-05 PROCEDURE — 85027 COMPLETE CBC AUTOMATED: CPT

## 2017-10-05 PROCEDURE — 86780 TREPONEMA PALLIDUM: CPT

## 2017-10-05 PROCEDURE — 36415 COLL VENOUS BLD VENIPUNCTURE: CPT

## 2017-10-10 ENCOUNTER — TELEPHONE (OUTPATIENT)
Dept: PEDIATRICS CLINIC | Facility: CLINIC | Age: 32
End: 2017-10-10

## 2017-10-10 NOTE — TELEPHONE ENCOUNTER
Informed pt that her Trep is negative. Informed pt that CBC will be sent to MD on Call, MAHESH to review.

## 2017-10-11 ENCOUNTER — ROUTINE PRENATAL (OUTPATIENT)
Dept: OBGYN CLINIC | Facility: CLINIC | Age: 32
End: 2017-10-11

## 2017-10-11 VITALS
WEIGHT: 223 LBS | HEART RATE: 86 BPM | BODY MASS INDEX: 34 KG/M2 | SYSTOLIC BLOOD PRESSURE: 109 MMHG | DIASTOLIC BLOOD PRESSURE: 73 MMHG

## 2017-10-11 DIAGNOSIS — Z34.93 ENCOUNTER FOR SUPERVISION OF NORMAL PREGNANCY IN THIRD TRIMESTER, UNSPECIFIED GRAVIDITY: Primary | ICD-10-CM

## 2017-10-11 NOTE — PROGRESS NOTES
No complaints. Reviewed kick counts and labor precautions. Recommend restarting PNV.  FMLA on clipboard  RTC 1 wk

## 2017-10-12 NOTE — TELEPHONE ENCOUNTER
CBC is normal for pregnancy. Nothing to do except restart PNV as recommended by [facility] at visit yesterday.

## 2017-10-18 ENCOUNTER — ROUTINE PRENATAL (OUTPATIENT)
Dept: OBGYN CLINIC | Facility: CLINIC | Age: 32
End: 2017-10-18

## 2017-10-18 VITALS
BODY MASS INDEX: 35 KG/M2 | WEIGHT: 226 LBS | SYSTOLIC BLOOD PRESSURE: 119 MMHG | DIASTOLIC BLOOD PRESSURE: 81 MMHG | HEART RATE: 78 BPM

## 2017-10-18 DIAGNOSIS — Z34.93 ENCOUNTER FOR SUPERVISION OF NORMAL PREGNANCY IN THIRD TRIMESTER, UNSPECIFIED GRAVIDITY: Primary | ICD-10-CM

## 2017-10-26 ENCOUNTER — ROUTINE PRENATAL (OUTPATIENT)
Dept: OBGYN CLINIC | Facility: CLINIC | Age: 32
End: 2017-10-26

## 2017-10-26 VITALS
HEART RATE: 84 BPM | DIASTOLIC BLOOD PRESSURE: 78 MMHG | SYSTOLIC BLOOD PRESSURE: 122 MMHG | BODY MASS INDEX: 35 KG/M2 | WEIGHT: 228.38 LBS

## 2017-10-26 DIAGNOSIS — Z34.93 ENCOUNTER FOR SUPERVISION OF NORMAL PREGNANCY IN THIRD TRIMESTER, UNSPECIFIED GRAVIDITY: Primary | ICD-10-CM

## 2017-10-28 ENCOUNTER — ANESTHESIA (OUTPATIENT)
Dept: OBGYN UNIT | Facility: HOSPITAL | Age: 32
End: 2017-10-28
Payer: COMMERCIAL

## 2017-10-28 ENCOUNTER — ANESTHESIA EVENT (OUTPATIENT)
Dept: OBGYN UNIT | Facility: HOSPITAL | Age: 32
End: 2017-10-28
Payer: COMMERCIAL

## 2017-10-28 ENCOUNTER — HOSPITAL ENCOUNTER (INPATIENT)
Facility: HOSPITAL | Age: 32
LOS: 2 days | Discharge: HOME | End: 2017-10-30
Attending: OBSTETRICS & GYNECOLOGY | Admitting: OBSTETRICS & GYNECOLOGY
Payer: COMMERCIAL

## 2017-10-28 PROBLEM — Z34.90 PREGNANCY (HCC): Status: ACTIVE | Noted: 2017-10-28

## 2017-10-28 PROBLEM — Z34.90 PREGNANCY: Status: ACTIVE | Noted: 2017-10-28

## 2017-10-28 PROCEDURE — 0DQR0ZZ REPAIR ANAL SPHINCTER, OPEN APPROACH: ICD-10-PCS | Performed by: OBSTETRICS & GYNECOLOGY

## 2017-10-28 PROCEDURE — 59400 OBSTETRICAL CARE: CPT | Performed by: OBSTETRICS & GYNECOLOGY

## 2017-10-28 RX ORDER — PHENYLEPHRINE HCL IN 0.9% NACL 0.5 MG/5ML
SYRINGE (ML) INTRAVENOUS
Status: DISCONTINUED
Start: 2017-10-28 | End: 2017-10-28 | Stop reason: WASHOUT

## 2017-10-28 RX ORDER — HYDROCODONE BITARTRATE AND ACETAMINOPHEN 5; 325 MG/1; MG/1
1 TABLET ORAL EVERY 4 HOURS PRN
Status: DISCONTINUED | OUTPATIENT
Start: 2017-10-28 | End: 2017-10-30

## 2017-10-28 RX ORDER — SODIUM CHLORIDE 0.9 % (FLUSH) 0.9 %
10 SYRINGE (ML) INJECTION AS NEEDED
Status: DISCONTINUED | OUTPATIENT
Start: 2017-10-28 | End: 2017-10-28 | Stop reason: HOSPADM

## 2017-10-28 RX ORDER — SIMETHICONE 80 MG
80 TABLET,CHEWABLE ORAL 3 TIMES DAILY PRN
Status: DISCONTINUED | OUTPATIENT
Start: 2017-10-28 | End: 2017-10-30

## 2017-10-28 RX ORDER — PRENATAL VIT,CAL 76/IRON/FOLIC 29 MG-1 MG
1 TABLET ORAL DAILY
Status: DISCONTINUED | OUTPATIENT
Start: 2017-10-28 | End: 2017-10-30

## 2017-10-28 RX ORDER — AMMONIA INHALANTS 0.04 G/.3ML
0.3 INHALANT RESPIRATORY (INHALATION) AS NEEDED
Status: DISCONTINUED | OUTPATIENT
Start: 2017-10-28 | End: 2017-10-28 | Stop reason: HOSPADM

## 2017-10-28 RX ORDER — ONDANSETRON 2 MG/ML
4 INJECTION INTRAMUSCULAR; INTRAVENOUS EVERY 6 HOURS PRN
Status: DISCONTINUED | OUTPATIENT
Start: 2017-10-28 | End: 2017-10-28 | Stop reason: HOSPADM

## 2017-10-28 RX ORDER — BUPIVACAINE HYDROCHLORIDE 2.5 MG/ML
INJECTION, SOLUTION EPIDURAL; INFILTRATION; INTRACAUDAL
Status: DISPENSED
Start: 2017-10-28 | End: 2017-10-28

## 2017-10-28 RX ORDER — SODIUM CHLORIDE, SODIUM LACTATE, POTASSIUM CHLORIDE, CALCIUM CHLORIDE 600; 310; 30; 20 MG/100ML; MG/100ML; MG/100ML; MG/100ML
INJECTION, SOLUTION INTRAVENOUS CONTINUOUS
Status: DISCONTINUED | OUTPATIENT
Start: 2017-10-28 | End: 2017-10-28 | Stop reason: HOSPADM

## 2017-10-28 RX ORDER — TERBUTALINE SULFATE 1 MG/ML
0.25 INJECTION, SOLUTION SUBCUTANEOUS AS NEEDED
Status: DISCONTINUED | OUTPATIENT
Start: 2017-10-28 | End: 2017-10-28 | Stop reason: HOSPADM

## 2017-10-28 RX ORDER — SODIUM CHLORIDE 0.9 % (FLUSH) 0.9 %
10 SYRINGE (ML) INJECTION AS NEEDED
Status: DISCONTINUED | OUTPATIENT
Start: 2017-10-28 | End: 2017-10-30

## 2017-10-28 RX ORDER — LIDOCAINE HYDROCHLORIDE 10 MG/ML
INJECTION, SOLUTION EPIDURAL; INFILTRATION; INTRACAUDAL; PERINEURAL AS NEEDED
Status: DISCONTINUED | OUTPATIENT
Start: 2017-10-28 | End: 2017-10-28 | Stop reason: SURG

## 2017-10-28 RX ORDER — LIDOCAINE HYDROCHLORIDE AND EPINEPHRINE 15; 5 MG/ML; UG/ML
INJECTION, SOLUTION EPIDURAL AS NEEDED
Status: DISCONTINUED | OUTPATIENT
Start: 2017-10-28 | End: 2017-10-28 | Stop reason: SURG

## 2017-10-28 RX ORDER — BUPIVACAINE HYDROCHLORIDE 2.5 MG/ML
INJECTION, SOLUTION EPIDURAL; INFILTRATION; INTRACAUDAL AS NEEDED
Status: DISCONTINUED | OUTPATIENT
Start: 2017-10-28 | End: 2017-10-28 | Stop reason: SURG

## 2017-10-28 RX ORDER — EPHEDRINE SULFATE/0.9% NACL/PF 25 MG/5 ML
SYRINGE (ML) INTRAVENOUS
Status: DISCONTINUED
Start: 2017-10-28 | End: 2017-10-28 | Stop reason: WASHOUT

## 2017-10-28 RX ORDER — DIAPER,BRIEF,INFANT-TODD,DISP
1 EACH MISCELLANEOUS EVERY 6 HOURS PRN
Status: DISCONTINUED | OUTPATIENT
Start: 2017-10-28 | End: 2017-10-30

## 2017-10-28 RX ORDER — DEXTROSE, SODIUM CHLORIDE, SODIUM LACTATE, POTASSIUM CHLORIDE, AND CALCIUM CHLORIDE 5; .6; .31; .03; .02 G/100ML; G/100ML; G/100ML; G/100ML; G/100ML
125 INJECTION, SOLUTION INTRAVENOUS CONTINUOUS
Status: DISCONTINUED | OUTPATIENT
Start: 2017-10-28 | End: 2017-10-28 | Stop reason: HOSPADM

## 2017-10-28 RX ORDER — TRISODIUM CITRATE DIHYDRATE AND CITRIC ACID MONOHYDRATE 500; 334 MG/5ML; MG/5ML
30 SOLUTION ORAL AS NEEDED
Status: DISCONTINUED | OUTPATIENT
Start: 2017-10-28 | End: 2017-10-28 | Stop reason: HOSPADM

## 2017-10-28 RX ORDER — IBUPROFEN 600 MG/1
600 TABLET ORAL EVERY 6 HOURS PRN
Status: DISCONTINUED | OUTPATIENT
Start: 2017-10-28 | End: 2017-10-30

## 2017-10-28 RX ORDER — AMMONIA INHALANTS 0.04 G/.3ML
0.3 INHALANT RESPIRATORY (INHALATION) AS NEEDED
Status: DISCONTINUED | OUTPATIENT
Start: 2017-10-28 | End: 2017-10-30

## 2017-10-28 RX ORDER — SODIUM CHLORIDE, SODIUM LACTATE, POTASSIUM CHLORIDE, CALCIUM CHLORIDE 600; 310; 30; 20 MG/100ML; MG/100ML; MG/100ML; MG/100ML
INJECTION, SOLUTION INTRAVENOUS
Status: COMPLETED
Start: 2017-10-28 | End: 2017-10-28

## 2017-10-28 RX ORDER — LIDOCAINE HYDROCHLORIDE 10 MG/ML
30 INJECTION, SOLUTION EPIDURAL; INFILTRATION; INTRACAUDAL; PERINEURAL ONCE
Status: COMPLETED | OUTPATIENT
Start: 2017-10-28 | End: 2017-10-28

## 2017-10-28 RX ORDER — ONDANSETRON 2 MG/ML
4 INJECTION INTRAMUSCULAR; INTRAVENOUS EVERY 6 HOURS PRN
Status: DISCONTINUED | OUTPATIENT
Start: 2017-10-28 | End: 2017-10-30

## 2017-10-28 RX ORDER — LIDOCAINE HYDROCHLORIDE AND EPINEPHRINE 20; 5 MG/ML; UG/ML
INJECTION, SOLUTION EPIDURAL; INFILTRATION; INTRACAUDAL; PERINEURAL
Status: DISCONTINUED
Start: 2017-10-28 | End: 2017-10-28 | Stop reason: WASHOUT

## 2017-10-28 RX ORDER — IBUPROFEN 600 MG/1
600 TABLET ORAL ONCE AS NEEDED
Status: DISCONTINUED | OUTPATIENT
Start: 2017-10-28 | End: 2017-10-28 | Stop reason: HOSPADM

## 2017-10-28 RX ORDER — DOCUSATE SODIUM 100 MG/1
100 CAPSULE, LIQUID FILLED ORAL 2 TIMES DAILY
Status: DISCONTINUED | OUTPATIENT
Start: 2017-10-28 | End: 2017-10-30

## 2017-10-28 RX ADMIN — LIDOCAINE HYDROCHLORIDE 5 ML: 10 INJECTION, SOLUTION EPIDURAL; INFILTRATION; INTRACAUDAL; PERINEURAL at 03:45:00

## 2017-10-28 RX ADMIN — LIDOCAINE HYDROCHLORIDE AND EPINEPHRINE 3 ML: 15; 5 INJECTION, SOLUTION EPIDURAL at 03:53:00

## 2017-10-28 RX ADMIN — BUPIVACAINE HYDROCHLORIDE 10 ML: 2.5 INJECTION, SOLUTION EPIDURAL; INFILTRATION; INTRACAUDAL at 03:53:00

## 2017-10-28 NOTE — PROGRESS NOTES
Patient received into room 355 via wheelchair .    Bedside report received from [facility] MARYANN, RN.  Bed in locked and low position.  Side rails up x2.  Vitals signs within normal limits, fundus firm at U/U, lochia small, no clots noted.  Pt unable to void- bladder

## 2017-10-28 NOTE — ANESTHESIA PROCEDURE NOTES
Labor Analgesia  Performed by: David Patel  Authorized by: David Patel     Start Time:  10/28/2017 3:45 AM  End Time:  10/28/2017 3:55 AM  Site Identification: surface landmarks    Anesthesiologist:  David Patel  Performed by:   Anesthesiolog

## 2017-10-28 NOTE — DISCHARGE SUMMARY
[facility]    Discharge Summary    [address] [facility] Patient Status:  Observation    1985 MRN [id number]   Location [address]  Attending Mando Finley MD   [facility] Day # 0       Delivering OB Clinician:  Dr Celena Champagne

## 2017-10-28 NOTE — ANESTHESIA POSTPROCEDURE EVALUATION
Patient: Deandre Galvan    Procedure Summary     Date:  10/28/17 Room / Location:      Anesthesia Start:  0345 Anesthesia Stop:  1001    Procedure:  LABOR ANALGESIA Diagnosis:      Scheduled Providers:   Anesthesiologist:  Yasmany Frazier MD    Anesthes

## 2017-10-28 NOTE — PLAN OF CARE
POSTPARTUM    • Long Term Goal:Experiences normal postpartum course Progressing    • Optimize infant feeding at the breast Progressing    • Establishment of adequate milk supply with medication/procedure interruptions Progressing           Apropriate mater

## 2017-10-28 NOTE — H&P
[address] [facility] Patient Status:  Observation    1985 MRN [id number]   Location [facility] Attending Silvestre Doan MD   Hosp Day # 0 PCP Romina Perez MD     Date of Admiss Prescriptions Prior to Admission:  Prenatal Vit-Fe Fumarate-FA (PRENATAL VITAMINS PLUS) 27-1 MG Oral Tab Take 1 tablet by mouth daily.  Disp:  Rfl:  Taking       Allergies: No Known Allergies      OBJECTIVE:    Temp:  [97.6 °F (36.4 °C)-98.4 °F (36.9 °C)]

## 2017-10-28 NOTE — ANESTHESIA PREPROCEDURE EVALUATION
Anesthesia PreOp Note    HPI:     Christen Veliz is a 32year old female who presents for preoperative consultation requested by: * No surgeons listed *    Date of Surgery:     * No procedures listed *  Indication: * No pre-op diagnosis entered *      His Intravenous Continuous Graciela Farah MD Last Rate: 125 mL/hr at 10/28/17 0230    penicillin G potassium 2.5 Million Units in sodium chloride 0.9 % 100 mL IVPB 2.5 Million Units Intravenous Q4H Graciela Farah MD     ondansetron HCl [facility]) injection 4 mg 4 mg I Would have 3-4 drinks at a party. Drug use: No    Sexual activity: Yes    Partners: Male     Other Topics Concern    Caffeine Concern Yes    Comment: Coffee 1 cup daily     Social History Narrative   None on file       Available pre-op labs reviewed.

## 2017-10-28 NOTE — L&D DELIVERY NOTE
[facility] - [facility]    Vaginal Delivery Note    Sherwin Winston Patient Status:  Observation    1985 MRN [id number]   Location [unfilled] Attending Mando Finley MD   Hosp Day # 0 PCP Jose Corrigan MD       Delivery     Infant  Date of Delivery

## 2017-10-29 PROCEDURE — 3E0234Z INTRODUCTION OF SERUM, TOXOID AND VACCINE INTO MUSCLE, PERCUTANEOUS APPROACH: ICD-10-PCS | Performed by: OBSTETRICS & GYNECOLOGY

## 2017-10-29 NOTE — PLAN OF CARE
Problem: POSTPARTUM  Goal: Optimize infant feeding at the breast  INTERVENTIONS:  - Initiate breast feeding within first hour after birth. - Monitor effectiveness of current breast feeding efforts. - Assess support systems available to mother/family.   - of current breast feeding efforts. - Assess support systems available to mother/family.  - Identify cultural beliefs/practices regarding lactation, letdown techniques, maternal food preferences.   - Assess mother's knowledge and previous experience with br of pacifier-artificial nipple  -  Educate mother on feeding cues   Outcome: Progressing    Mom reports effective feeds, is confident. Breastfeeding teaching initiated and handouts reviewed. Showed mom how to do hand expression.   She has a group of breast

## 2017-10-29 NOTE — PROGRESS NOTES
[facility]    OB/Gyne Post  Progress Note      Vijaya Peña Patient Status:  Inpatient    1985 MRN [id number]   Location [facility] 3SE Attending Krista Martinez MD   Hosp Day # 1 PCP Ladi Boothe MD       Subjective cervix)     H/O LEEP     Genetic testing     Former smoker     Rubella Equivocal     Subserous leiomyoma of uterus     Irregular contractions     Pregnancy  .     ambulate, continue routine postpartum care    Queen Dylan MD  10/29/2017  9:56 AM

## 2017-10-29 NOTE — LACTATION NOTE
LACTATION NOTE - MOTHER      Evaluation Type: Inpatient    Problems identified  Problems identified: Knowledge deficit    Maternal history  Other/comment: hx cervical cancer in situ w/LEEP and colpo, hx HPV, hx subserous leiomyoma    Breastfeeding goal  Br

## 2017-10-29 NOTE — PLAN OF CARE
BIRTH - VAGINAL/ SECTION    • Fetal and maternal status remain reassuring during the birth process Completed        GENITOURINARY - ADULT    • Absence of urinary retention Completed          POSTPARTUM    • Long Term Goal:Experiences normal postpar

## 2017-10-30 VITALS
SYSTOLIC BLOOD PRESSURE: 134 MMHG | RESPIRATION RATE: 18 BRPM | DIASTOLIC BLOOD PRESSURE: 84 MMHG | TEMPERATURE: 98 F | HEIGHT: 67.5 IN | WEIGHT: 228 LBS | HEART RATE: 87 BPM | BODY MASS INDEX: 35.37 KG/M2

## 2017-12-12 ENCOUNTER — POSTPARTUM (OUTPATIENT)
Dept: OBGYN CLINIC | Facility: CLINIC | Age: 32
End: 2017-12-12

## 2017-12-12 VITALS
SYSTOLIC BLOOD PRESSURE: 119 MMHG | DIASTOLIC BLOOD PRESSURE: 80 MMHG | WEIGHT: 203 LBS | BODY MASS INDEX: 31 KG/M2 | HEART RATE: 64 BPM

## 2017-12-12 NOTE — PROGRESS NOTES
POPPY Doan is a 28year old female  here for 6 week post-partum visit. Patient delivered a  female infant on 10/28/17 by . Had 3rd degree perineal laceration. No problems with BM. Patient desires condoms for contraception.   Pa

## 2018-01-03 ENCOUNTER — TELEPHONE (OUTPATIENT)
Dept: OBGYN CLINIC | Facility: CLINIC | Age: 33
End: 2018-01-03

## 2018-01-03 NOTE — TELEPHONE ENCOUNTER
Pt states she has vaginal swelling in area where stitches where placed, feels lump. Luis Feldman was delivering physician. pls adv.

## 2018-01-03 NOTE — TELEPHONE ENCOUNTER
C/O LUMP IN PERINEUM WHERE STITCHES WERE PLACED AFTER DELIVERY. STATES A NORMAL POST PARTUM CHECK AND NO PROBLEMS UNTIL 2 DAYS AGO. STATES THE AREA FEELS BRUISED, NOT NECESSARILY REALLY PAINFUL. ICED THE AREA YESTERDAY WITHOUT MUCH CHANGE/IMPROVEMENT.

## 2018-01-05 ENCOUNTER — OFFICE VISIT (OUTPATIENT)
Dept: OBGYN CLINIC | Facility: CLINIC | Age: 33
End: 2018-01-05

## 2018-01-05 VITALS
DIASTOLIC BLOOD PRESSURE: 73 MMHG | HEART RATE: 102 BPM | WEIGHT: 198 LBS | SYSTOLIC BLOOD PRESSURE: 124 MMHG | BODY MASS INDEX: 31 KG/M2

## 2018-01-05 DIAGNOSIS — N90.7 LABIAL CYST: Primary | ICD-10-CM

## 2018-01-05 PROCEDURE — 99213 OFFICE O/P EST LOW 20 MIN: CPT | Performed by: OBSTETRICS & GYNECOLOGY

## 2018-01-05 NOTE — PROGRESS NOTES
Kyler Williamson is a 28year old female [id number] Patient's last menstrual period was 01/19/2017 (exact date). Patient presents with:  Gyn Problem: Vaginal lump    Last seen 12/12/17 for PP visit. Doing well at that time.   3rd degree perineal laceration hea encounter

## 2018-01-31 ENCOUNTER — TELEPHONE (OUTPATIENT)
Dept: OBGYN CLINIC | Facility: CLINIC | Age: 33
End: 2018-01-31

## 2018-02-01 NOTE — TELEPHONE ENCOUNTER
Lmtcb. Please relay info:     Referral for breast pump in process. Patient advised to monitor approval via [employer] and to call [phone] in 3 business days after approval, to check the status of delivery.

## 2018-02-13 NOTE — TELEPHONE ENCOUNTER
RECEIVED REQUEST FROM [facility] AGAIN FOR BREAST PUMP ORDER AND REFERRAL. FORM COMPLETED AND FAXED BACK WITH APPROVED REFERRAL AGAIN.

## 2018-02-14 ENCOUNTER — OFFICE VISIT (OUTPATIENT)
Dept: INTERNAL MEDICINE CLINIC | Facility: CLINIC | Age: 33
End: 2018-02-14

## 2018-02-14 ENCOUNTER — LAB ENCOUNTER (OUTPATIENT)
Dept: LAB | Facility: HOSPITAL | Age: 33
End: 2018-02-14
Attending: INTERNAL MEDICINE
Payer: COMMERCIAL

## 2018-02-14 VITALS
HEART RATE: 77 BPM | DIASTOLIC BLOOD PRESSURE: 74 MMHG | BODY MASS INDEX: 31.44 KG/M2 | TEMPERATURE: 99 F | SYSTOLIC BLOOD PRESSURE: 113 MMHG | WEIGHT: 200.31 LBS | HEIGHT: 67 IN

## 2018-02-14 DIAGNOSIS — Z00.00 ANNUAL PHYSICAL EXAM: ICD-10-CM

## 2018-02-14 DIAGNOSIS — Z00.00 ANNUAL PHYSICAL EXAM: Primary | ICD-10-CM

## 2018-02-14 DIAGNOSIS — Z12.83 SCREENING FOR SKIN CANCER: ICD-10-CM

## 2018-02-14 LAB
25(OH)D3 SERPL-MCNC: 21.1 NG/ML
ALBUMIN SERPL BCP-MCNC: 4.1 G/DL (ref 3.5–4.8)
ALBUMIN/GLOB SERPL: 1.4 {RATIO} (ref 1–2)
ALP SERPL-CCNC: 66 U/L (ref 32–100)
ALT SERPL-CCNC: 14 U/L (ref 14–54)
ANION GAP SERPL CALC-SCNC: 9 MMOL/L (ref 0–18)
AST SERPL-CCNC: 14 U/L (ref 15–41)
BASOPHILS # BLD: 0 K/UL (ref 0–0.2)
BASOPHILS NFR BLD: 1 %
BILIRUB SERPL-MCNC: 0.9 MG/DL (ref 0.3–1.2)
BUN SERPL-MCNC: 12 MG/DL (ref 8–20)
BUN/CREAT SERPL: 17.1 (ref 10–20)
CALCIUM SERPL-MCNC: 9.4 MG/DL (ref 8.5–10.5)
CHLORIDE SERPL-SCNC: 107 MMOL/L (ref 95–110)
CO2 SERPL-SCNC: 23 MMOL/L (ref 22–32)
CREAT SERPL-MCNC: 0.7 MG/DL (ref 0.5–1.5)
EOSINOPHIL # BLD: 0.1 K/UL (ref 0–0.7)
EOSINOPHIL NFR BLD: 1 %
ERYTHROCYTE [DISTWIDTH] IN BLOOD BY AUTOMATED COUNT: 15.7 % (ref 11–15)
GLOBULIN PLAS-MCNC: 3 G/DL (ref 2.5–3.7)
GLUCOSE SERPL-MCNC: 93 MG/DL (ref 70–99)
HBA1C MFR BLD: 5 % (ref 4–6)
HCT VFR BLD AUTO: 38.4 % (ref 35–48)
HGB BLD-MCNC: 13.1 G/DL (ref 12–16)
LYMPHOCYTES # BLD: 1.7 K/UL (ref 1–4)
LYMPHOCYTES NFR BLD: 30 %
MCH RBC QN AUTO: 29.6 PG (ref 27–32)
MCHC RBC AUTO-ENTMCNC: 34 G/DL (ref 32–37)
MCV RBC AUTO: 87 FL (ref 80–100)
MONOCYTES # BLD: 0.4 K/UL (ref 0–1)
MONOCYTES NFR BLD: 8 %
NEUTROPHILS # BLD AUTO: 3.4 K/UL (ref 1.8–7.7)
NEUTROPHILS NFR BLD: 61 %
OSMOLALITY UR CALC.SUM OF ELEC: 287 MOSM/KG (ref 275–295)
PATIENT FASTING: YES
PLATELET # BLD AUTO: 245 K/UL (ref 140–400)
PMV BLD AUTO: 9.3 FL (ref 7.4–10.3)
POTASSIUM SERPL-SCNC: 4.2 MMOL/L (ref 3.3–5.1)
PROT SERPL-MCNC: 7.1 G/DL (ref 5.9–8.4)
RBC # BLD AUTO: 4.41 M/UL (ref 3.7–5.4)
SODIUM SERPL-SCNC: 139 MMOL/L (ref 136–144)
TSH SERPL-ACNC: 1.08 UIU/ML (ref 0.45–5.33)
WBC # BLD AUTO: 5.6 K/UL (ref 4–11)

## 2018-02-14 PROCEDURE — 85025 COMPLETE CBC W/AUTO DIFF WBC: CPT

## 2018-02-14 PROCEDURE — 36415 COLL VENOUS BLD VENIPUNCTURE: CPT

## 2018-02-14 PROCEDURE — 82306 VITAMIN D 25 HYDROXY: CPT

## 2018-02-14 PROCEDURE — 83036 HEMOGLOBIN GLYCOSYLATED A1C: CPT

## 2018-02-14 PROCEDURE — 99395 PREV VISIT EST AGE 18-39: CPT | Performed by: INTERNAL MEDICINE

## 2018-02-14 PROCEDURE — 84443 ASSAY THYROID STIM HORMONE: CPT

## 2018-02-14 PROCEDURE — 80053 COMPREHEN METABOLIC PANEL: CPT

## 2018-02-14 NOTE — PROGRESS NOTES
Mary Lagos is a 28year old female. Patient presents with:  Physical      HPI:   Patient is 28 yr old female here for annual physical.  She complaints of low back 7/10 intensity achy, nonadapting pain. The pain is intermittent gets 1-2 times a week. swelling in feet. GI: denies abdominal pain and denies heartburn, nausea or vomiting. : No pain on urination, change in the color of urine, discharge, urinating frequently. MUSK: Low back pain present.   NEURO: denies tingling numbness or headaches  EN frozen food and sweetened beverages. -     CBC WITH DIFFERENTIAL WITH PLATELET; Future  -     COMP METABOLIC PANEL (14);  Future  -     HEMOGLOBIN A1C; Future  -     VITAMIN D, 25-HYDROXY; Future  -     TSH W REFLEX TO FREE T4; Future    Screening for sk

## 2018-04-02 ENCOUNTER — HOSPITAL ENCOUNTER (EMERGENCY)
Facility: HOSPITAL | Age: 33
Discharge: HOME | End: 2018-04-02
Attending: EMERGENCY MEDICINE
Payer: COMMERCIAL

## 2018-04-02 VITALS
RESPIRATION RATE: 16 BRPM | DIASTOLIC BLOOD PRESSURE: 75 MMHG | WEIGHT: 194 LBS | TEMPERATURE: 98 F | OXYGEN SATURATION: 99 % | BODY MASS INDEX: 30.45 KG/M2 | SYSTOLIC BLOOD PRESSURE: 114 MMHG | HEART RATE: 63 BPM | HEIGHT: 67 IN

## 2018-04-02 DIAGNOSIS — R10.84 ABDOMINAL PAIN, GENERALIZED: Primary | ICD-10-CM

## 2018-04-02 PROCEDURE — 81025 URINE PREGNANCY TEST: CPT

## 2018-04-02 PROCEDURE — 83690 ASSAY OF LIPASE: CPT | Performed by: EMERGENCY MEDICINE

## 2018-04-02 PROCEDURE — 81001 URINALYSIS AUTO W/SCOPE: CPT | Performed by: EMERGENCY MEDICINE

## 2018-04-02 PROCEDURE — 80048 BASIC METABOLIC PNL TOTAL CA: CPT | Performed by: EMERGENCY MEDICINE

## 2018-04-02 PROCEDURE — 99283 EMERGENCY DEPT VISIT LOW MDM: CPT

## 2018-04-02 PROCEDURE — 80076 HEPATIC FUNCTION PANEL: CPT | Performed by: EMERGENCY MEDICINE

## 2018-04-02 PROCEDURE — 36415 COLL VENOUS BLD VENIPUNCTURE: CPT

## 2018-04-02 PROCEDURE — 85025 COMPLETE CBC W/AUTO DIFF WBC: CPT | Performed by: EMERGENCY MEDICINE

## 2018-04-02 RX ORDER — FAMOTIDINE 20 MG/1
20 TABLET ORAL ONCE
Status: COMPLETED | OUTPATIENT
Start: 2018-04-02 | End: 2018-04-02

## 2018-04-02 RX ORDER — ONDANSETRON 4 MG/1
4 TABLET, ORALLY DISINTEGRATING ORAL EVERY 4 HOURS PRN
Qty: 15 TABLET | Refills: 0 | Status: SHIPPED | OUTPATIENT
Start: 2018-04-02 | End: 2018-04-04 | Stop reason: ALTCHOICE

## 2018-04-02 RX ORDER — FAMOTIDINE 20 MG
20 TABLET ORAL 2 TIMES DAILY
Qty: 60 TABLET | Refills: 0 | Status: SHIPPED | OUTPATIENT
Start: 2018-04-02 | End: 2018-04-04 | Stop reason: ALTCHOICE

## 2018-04-02 RX ORDER — ONDANSETRON 4 MG/1
4 TABLET, ORALLY DISINTEGRATING ORAL ONCE
Status: COMPLETED | OUTPATIENT
Start: 2018-04-02 | End: 2018-04-02

## 2018-04-03 NOTE — ED PROVIDER NOTES
Patient Seen in: [facility] Emergency Department    History   Patient presents with:  Abdomen/Flank Pain (GI/)      HPI    Patient presents to the ED complaining of abdominal pain for the past several weeks.   She states pain is generalized and m presenting problem noted.     Physical Exam     ED Triage Vitals  BP: 132/77 [04/02/18 2107]  Pulse: 64 [04/02/18 2107]  Resp: 18 [04/02/18 2107]  Temp: 98 °F (36.7 °C) [04/02/18 2107]  Temp src: n/a  SpO2: 100 % [04/02/18 2107]  O2 Device: None (Room air) result                 Please view results for these tests on the individual orders.    CBC W/ DIFFERENTIAL         Imaging Results Available and Reviewed while in ED:     ED Medications Administered:   Medications   ondansetron (ZOFRAN-ODT) disintegrating [address]  [phone]    Schedule an appointment as soon as possible for a visit in 3 days      MD Lenore Young [facility] [place] [facility] [phone]    Schedule an appointment as soon as possible for a visit in 2 weeks        [address] [place] [address]

## 2018-04-03 NOTE — ED NOTES
Pt to ER with c/o pain that started in mid-back and is now in abdomen. States she feels bloated, especially after eating. Denies any loss of appetite, N/V/D. Denies fever or chills. Denies any pain or burning with urination.

## 2018-04-04 ENCOUNTER — OFFICE VISIT (OUTPATIENT)
Dept: INTERNAL MEDICINE CLINIC | Facility: CLINIC | Age: 33
End: 2018-04-04

## 2018-04-04 VITALS
HEART RATE: 69 BPM | SYSTOLIC BLOOD PRESSURE: 123 MMHG | BODY MASS INDEX: 31.25 KG/M2 | TEMPERATURE: 98 F | WEIGHT: 199.13 LBS | DIASTOLIC BLOOD PRESSURE: 76 MMHG | HEIGHT: 67 IN

## 2018-04-04 DIAGNOSIS — R10.84 GENERALIZED ABDOMINAL PAIN: Primary | ICD-10-CM

## 2018-04-04 PROCEDURE — 99213 OFFICE O/P EST LOW 20 MIN: CPT | Performed by: INTERNAL MEDICINE

## 2018-04-04 PROCEDURE — 99212 OFFICE O/P EST SF 10 MIN: CPT | Performed by: INTERNAL MEDICINE

## 2018-04-04 RX ORDER — OMEPRAZOLE 40 MG/1
40 CAPSULE, DELAYED RELEASE ORAL DAILY
Qty: 30 CAPSULE | Refills: 0 | Status: SHIPPED | OUTPATIENT
Start: 2018-04-04 | End: 2018-04-30

## 2018-04-04 NOTE — PROGRESS NOTES
Christen Veliz is a 28year old female. Patient presents with:  ER F/U: pt went to [facility] ER On 4/2/18 for Abdominal pain      HPI:     HPI  Patient is here abdominal pain bloating. Pain started 3-4 weeks ago.  Describes that pain is diffuse and not localize Quit date: 3/17/2017  Smokeless tobacco: Never Used                      Alcohol use: No               Comment: occasionally before pregnancy. Would have               3-4 drinks at a party.        REVIEW OF SYSTEMS:   ROS  GENERAL HEALTH: No fevers, chill gastritis, PUD, colitis, IBS, gallbladder  disc disease. Also possible musculoskeletal origin or hormonal changes as she could be restarting her menstrual cycle.   Physical exam and her symptoms are very nonspecific and not indicating of any particular pat

## 2018-04-24 ENCOUNTER — OFFICE VISIT (OUTPATIENT)
Dept: DERMATOLOGY CLINIC | Facility: CLINIC | Age: 33
End: 2018-04-24

## 2018-04-24 DIAGNOSIS — L81.4 SOLAR LENTIGO: ICD-10-CM

## 2018-04-24 DIAGNOSIS — D23.4 BENIGN NEOPLASM OF SCALP AND SKIN OF NECK: Primary | ICD-10-CM

## 2018-04-24 DIAGNOSIS — D23.30 BENIGN NEOPLASM OF SKIN OF FACE: ICD-10-CM

## 2018-04-24 DIAGNOSIS — D23.60 BENIGN NEOPLASM OF SKIN OF UPPER EXTREMITY AND SHOULDER, UNSPECIFIED LATERALITY: ICD-10-CM

## 2018-04-24 DIAGNOSIS — D23.5 BENIGN NEOPLASM OF SKIN OF TRUNK, EXCEPT SCROTUM: ICD-10-CM

## 2018-04-24 DIAGNOSIS — D23.70 BENIGN NEOPLASM OF SKIN OF LOWER EXTREMITY, INCLUDING HIP, UNSPECIFIED LATERALITY: ICD-10-CM

## 2018-04-24 PROCEDURE — 99212 OFFICE O/P EST SF 10 MIN: CPT | Performed by: DERMATOLOGY

## 2018-04-24 PROCEDURE — 99202 OFFICE O/P NEW SF 15 MIN: CPT | Performed by: DERMATOLOGY

## 2018-04-24 NOTE — PROGRESS NOTES
HPI:     Chief Complaint     Lesion        HPI     Lesion    Additional comments: \"New pt\"- Pt presenting today with raised lesions to scalp and some flat brown lesions to left ear and requesting full body skin check.  Pt denies a personal/family hx of SC 0.50 Packs/day  For 15.00 Years     Quit date: 3/17/2017    Smokeless tobacco: Never Used    Alcohol use Yes    Comment: social     Drug use: No    Sexual activity: Yes    Partners: Male     Other Topics Concern    Caffeine Concern Yes    Comment: Coffee 1 outsides so that  it is absorbed and working and to reapply it every few hours.     Benign neoplasm of skin of face  Benign neoplasm of skin of upper extremity and shoulder, unspecified laterality  Benign neoplasm of skin of lower extremity, including hip,

## 2018-04-24 NOTE — PROGRESS NOTES
Past Medical History:   Diagnosis Date   • Decorative tattoo    • HPV in female    • Varicella     Had chicken pox during childhood   Past Surgical History:  No date: COLPOSCOPY, CERVIX W/UPPER ADJACENT VAGINA; W/*  2015: LEEANNA  Social History  Social Histo

## 2018-04-30 ENCOUNTER — OFFICE VISIT (OUTPATIENT)
Dept: OBGYN CLINIC | Facility: CLINIC | Age: 33
End: 2018-04-30

## 2018-04-30 VITALS
HEIGHT: 68.5 IN | HEART RATE: 73 BPM | WEIGHT: 199.63 LBS | DIASTOLIC BLOOD PRESSURE: 79 MMHG | SYSTOLIC BLOOD PRESSURE: 119 MMHG | BODY MASS INDEX: 29.91 KG/M2

## 2018-04-30 DIAGNOSIS — Z01.419 ENCOUNTER FOR GYNECOLOGICAL EXAMINATION WITHOUT ABNORMAL FINDING: Primary | ICD-10-CM

## 2018-04-30 PROCEDURE — 99395 PREV VISIT EST AGE 18-39: CPT | Performed by: OBSTETRICS & GYNECOLOGY

## 2018-05-11 NOTE — PROGRESS NOTES
HPI:    Patient ID: Jennifer Thakkar is a 28year old female. HPI  with LMP 2017. She was lactating until 1 year PP. No BC. Review of Systems   Constitutional: Negative for appetite change, fatigue and unexpected weight change.    Eyes mass, no tenderness and no fullness. No vaginal discharge found. Musculoskeletal: She exhibits no edema or tenderness. Lymphadenopathy:     She has no cervical adenopathy. She has no axillary adenopathy.         Right: No inguinal adenopathy present

## 2018-08-01 ENCOUNTER — TELEPHONE (OUTPATIENT)
Dept: OTHER | Age: 33
End: 2018-08-01

## 2018-08-01 NOTE — TELEPHONE ENCOUNTER
Pt contacts clinic, states she took a home UTI test which came back positive for nitrates and WBC's. Urine looks pink and has a slight foul odor. She has a mild burning sensation. Denies fever, chills, body aces or flank pain.   Pt would like rx or other

## 2018-08-03 ENCOUNTER — LAB ENCOUNTER (OUTPATIENT)
Dept: LAB | Facility: HOSPITAL | Age: 33
End: 2018-08-03
Attending: OBSTETRICS & GYNECOLOGY
Payer: COMMERCIAL

## 2018-08-03 ENCOUNTER — TELEPHONE (OUTPATIENT)
Dept: OBGYN CLINIC | Facility: CLINIC | Age: 33
End: 2018-08-03

## 2018-08-03 DIAGNOSIS — R31.9 HEMATURIA, UNSPECIFIED TYPE: ICD-10-CM

## 2018-08-03 DIAGNOSIS — M54.50 LOW BACK PAIN, NON-SPECIFIC: ICD-10-CM

## 2018-08-03 DIAGNOSIS — R30.9 PAIN WITH URINATION: Primary | ICD-10-CM

## 2018-08-03 DIAGNOSIS — R30.9 PAIN WITH URINATION: ICD-10-CM

## 2018-08-03 DIAGNOSIS — R10.2 PELVIC PRESSURE IN FEMALE: ICD-10-CM

## 2018-08-03 LAB
BILIRUB UR QL: NEGATIVE
CLARITY UR: CLEAR
COLOR UR: YELLOW
GLUCOSE UR-MCNC: NEGATIVE MG/DL
KETONES UR-MCNC: NEGATIVE MG/DL
NITRITE UR QL STRIP.AUTO: NEGATIVE
PH UR: 5 [PH] (ref 5–8)
PROT UR-MCNC: NEGATIVE MG/DL
RBC #/AREA URNS AUTO: 9 /HPF
SP GR UR STRIP: 1.01 (ref 1–1.03)
UROBILINOGEN UR STRIP-ACNC: <2
VIT C UR-MCNC: NEGATIVE MG/DL
WBC #/AREA URNS AUTO: 8 /HPF

## 2018-08-03 PROCEDURE — 87086 URINE CULTURE/COLONY COUNT: CPT

## 2018-08-03 PROCEDURE — 81001 URINALYSIS AUTO W/SCOPE: CPT

## 2018-08-03 NOTE — TELEPHONE ENCOUNTER
Informed pt that [address] stated will wait for the culture. Informed pt to call to check on her culture. Pt stated understanding.

## 2018-08-03 NOTE — TELEPHONE ENCOUNTER
Sent to MD on Call, Enedina Cowan, to review UA. Urine culture is in process. MAHESH is out of the office. Denies any allergies. Denies a fever. Pt has urgency, frequency, blood in urine, lower back pain, pelvic pressure and can't empty her bladder all they way. Pt states that she is not pregnant.

## 2018-08-03 NOTE — TELEPHONE ENCOUNTER
There are no appts until Monday with any IM provider. Can pt submit urine specimen? Or send to immediate care?

## 2018-08-03 NOTE — TELEPHONE ENCOUNTER
C/O URINARY FREQUENCY, NOTICED A LITTLE BLOOD IN URINE, LOW BACK PAIN AND SOME PELVIC PRESSURE. DOESN'T FEEL LIKE SHE CAN EMPTY HER BLADDER ALL THE WAY SOMETIMES. SYMPTOMS FOR 4 DAYS. USED OTC UA TEST WHICH WAS POSITIVE FOR NITRATES. PT WILL COME FOR UA THIS AFTERNOON. ORDER PLACED. PHARMACY AND ALLERGIES CONFIRMED.

## 2018-08-04 NOTE — TELEPHONE ENCOUNTER
Per chart review, pt called OB/Gyne office after relaying Dr. Hamilton Cervantes recommendations. OB/gyne performed UA and culture as requested. They are awaiting results. No further action required.

## 2018-08-06 ENCOUNTER — TELEPHONE (OUTPATIENT)
Dept: OBGYN CLINIC | Facility: CLINIC | Age: 33
End: 2018-08-06

## 2018-08-06 RX ORDER — NITROFURANTOIN 25; 75 MG/1; MG/1
100 CAPSULE ORAL 2 TIMES DAILY
Qty: 14 CAPSULE | Refills: 0 | Status: SHIPPED | OUTPATIENT
Start: 2018-08-06 | End: 2018-08-13

## 2018-08-06 NOTE — TELEPHONE ENCOUNTER
Reviewed Cx results from 8/3/18 with MAHESH. V/O from MAHESH if pt symptomatic Marcobid BID 7 days. Pt states she is still having pelvic pressure and dysuria. Rx sent and pharmacy verified with pt. Pt verbalized understanding.

## 2018-10-29 ENCOUNTER — OFFICE VISIT (OUTPATIENT)
Dept: INTERNAL MEDICINE CLINIC | Facility: CLINIC | Age: 33
End: 2018-10-29

## 2018-10-29 VITALS
HEIGHT: 67 IN | TEMPERATURE: 98 F | BODY MASS INDEX: 30.86 KG/M2 | HEART RATE: 66 BPM | WEIGHT: 196.63 LBS | SYSTOLIC BLOOD PRESSURE: 130 MMHG | DIASTOLIC BLOOD PRESSURE: 86 MMHG

## 2018-10-29 DIAGNOSIS — R11.0 NAUSEA: ICD-10-CM

## 2018-10-29 DIAGNOSIS — R68.89 FLU-LIKE SYMPTOMS: Primary | ICD-10-CM

## 2018-10-29 PROCEDURE — 99213 OFFICE O/P EST LOW 20 MIN: CPT | Performed by: INTERNAL MEDICINE

## 2018-10-29 PROCEDURE — 99212 OFFICE O/P EST SF 10 MIN: CPT | Performed by: INTERNAL MEDICINE

## 2018-10-30 NOTE — PROGRESS NOTES
Dick Dyson is a 28year old female. Patient presents with:  Abdominal Pain: pt c/o of nausea x 7 days      HPI:     HPI  Patient is here with multiple complaints. Reports that for about 1 week she has not been feeling well.   It started up with heada Cardiovascular: Negative for chest pain, palpitations and leg swelling. Gastrointestinal: Positive for abdominal pain and nausea. Negative for blood in stool, constipation, diarrhea, heartburn, melena and vomiting.         Abdominal bloating   Genitouri adenopathy. Neurological: She is alert and oriented to person, place, and time. She has normal reflexes. No cranial nerve deficit. Skin: Skin is warm and dry. No rash noted. Psychiatric: She has a normal mood and affect.          ASSESSMENT AND PLAN:

## 2019-01-09 ENCOUNTER — TELEPHONE (OUTPATIENT)
Dept: OBGYN CLINIC | Facility: CLINIC | Age: 34
End: 2019-01-09

## 2019-01-09 NOTE — TELEPHONE ENCOUNTER
Pt wants to come in for a fertility consult but W/ MAHESH ARAGON's first availability isnt until 2/14

## 2019-01-09 NOTE — TELEPHONE ENCOUNTER
Pt calling to report that her and her  want to star trying to conceive soon and she wants to discuss some concerns she has prior to starting to try to conceive. Pt stated she has had a hx of  LEEP and wants to discuss.  Pt accepted appt with MAHESH on M

## 2019-01-14 ENCOUNTER — OFFICE VISIT (OUTPATIENT)
Dept: OBGYN CLINIC | Facility: CLINIC | Age: 34
End: 2019-01-14

## 2019-01-14 VITALS
HEART RATE: 62 BPM | DIASTOLIC BLOOD PRESSURE: 84 MMHG | BODY MASS INDEX: 31 KG/M2 | SYSTOLIC BLOOD PRESSURE: 127 MMHG | WEIGHT: 198.81 LBS

## 2019-01-14 DIAGNOSIS — Z12.4 SCREENING FOR MALIGNANT NEOPLASM OF CERVIX: ICD-10-CM

## 2019-01-14 DIAGNOSIS — Z01.419 ENCOUNTER FOR GYNECOLOGICAL EXAMINATION WITHOUT ABNORMAL FINDING: Primary | ICD-10-CM

## 2019-01-14 PROCEDURE — 99395 PREV VISIT EST AGE 18-39: CPT | Performed by: OBSTETRICS & GYNECOLOGY

## 2019-01-15 LAB — HPV I/H RISK 1 DNA SPEC QL NAA+PROBE: NEGATIVE

## 2019-01-28 NOTE — PROGRESS NOTES
HPI:    Patient ID: Dick Nephju is a 35year old female. HPI   with monthly and normal menses using withdrawal for [facility]. She will stop withdrawal now. Starting PNV as well. She is weaning lactation this month.  Hx of LEEP in  but then term motion tenderness and no discharge. Right adnexum displays no mass, no tenderness and no fullness. Left adnexum displays no mass, no tenderness and no fullness. No vaginal discharge found. Musculoskeletal: She exhibits no edema or tenderness.    Lymphaden

## 2019-02-18 ENCOUNTER — LAB ENCOUNTER (OUTPATIENT)
Dept: LAB | Facility: HOSPITAL | Age: 34
End: 2019-02-18
Attending: INTERNAL MEDICINE
Payer: COMMERCIAL

## 2019-02-18 ENCOUNTER — OFFICE VISIT (OUTPATIENT)
Dept: INTERNAL MEDICINE CLINIC | Facility: CLINIC | Age: 34
End: 2019-02-18

## 2019-02-18 VITALS
TEMPERATURE: 98 F | HEART RATE: 73 BPM | HEIGHT: 67 IN | SYSTOLIC BLOOD PRESSURE: 127 MMHG | WEIGHT: 203.81 LBS | BODY MASS INDEX: 31.99 KG/M2 | DIASTOLIC BLOOD PRESSURE: 85 MMHG

## 2019-02-18 DIAGNOSIS — Z00.00 ANNUAL PHYSICAL EXAM: ICD-10-CM

## 2019-02-18 DIAGNOSIS — Z00.00 ANNUAL PHYSICAL EXAM: Primary | ICD-10-CM

## 2019-02-18 DIAGNOSIS — Z28.21 IMMUNIZATION NOT CARRIED OUT BECAUSE OF PATIENT REFUSAL: ICD-10-CM

## 2019-02-18 LAB
ALBUMIN SERPL-MCNC: 3.5 G/DL (ref 3.4–5)
ALBUMIN/GLOB SERPL: 0.9 {RATIO} (ref 1–2)
ALP LIVER SERPL-CCNC: 67 U/L (ref 37–98)
ALT SERPL-CCNC: 17 U/L (ref 13–56)
ANION GAP SERPL CALC-SCNC: 5 MMOL/L (ref 0–18)
AST SERPL-CCNC: 6 U/L (ref 15–37)
BASOPHILS # BLD AUTO: 0.03 X10(3) UL (ref 0–0.2)
BASOPHILS NFR BLD AUTO: 0.6 %
BILIRUB SERPL-MCNC: 0.4 MG/DL (ref 0.1–2)
BUN BLD-MCNC: 14 MG/DL (ref 7–18)
BUN/CREAT SERPL: 16.9 (ref 10–20)
CALCIUM BLD-MCNC: 8.7 MG/DL (ref 8.5–10.1)
CHLORIDE SERPL-SCNC: 110 MMOL/L (ref 98–107)
CHOLEST SMN-MCNC: 150 MG/DL (ref ?–200)
CO2 SERPL-SCNC: 27 MMOL/L (ref 21–32)
CREAT BLD-MCNC: 0.83 MG/DL (ref 0.55–1.02)
DEPRECATED RDW RBC AUTO: 42.9 FL (ref 35.1–46.3)
EOSINOPHIL # BLD AUTO: 0.07 X10(3) UL (ref 0–0.7)
EOSINOPHIL NFR BLD AUTO: 1.3 %
ERYTHROCYTE [DISTWIDTH] IN BLOOD BY AUTOMATED COUNT: 12.9 % (ref 11–15)
GLOBULIN PLAS-MCNC: 3.7 G/DL (ref 2.8–4.4)
GLUCOSE BLD-MCNC: 95 MG/DL (ref 70–99)
HCT VFR BLD AUTO: 39.3 % (ref 35–48)
HDLC SERPL-MCNC: 60 MG/DL (ref 40–59)
HGB BLD-MCNC: 13 G/DL (ref 12–16)
IMM GRANULOCYTES # BLD AUTO: 0 X10(3) UL (ref 0–1)
IMM GRANULOCYTES NFR BLD: 0 %
LDLC SERPL CALC-MCNC: 81 MG/DL (ref ?–100)
LYMPHOCYTES # BLD AUTO: 2.22 X10(3) UL (ref 1–4)
LYMPHOCYTES NFR BLD AUTO: 42.2 %
M PROTEIN MFR SERPL ELPH: 7.2 G/DL (ref 6.4–8.2)
MCH RBC QN AUTO: 30 PG (ref 26–34)
MCHC RBC AUTO-ENTMCNC: 33.1 G/DL (ref 31–37)
MCV RBC AUTO: 90.6 FL (ref 80–100)
MONOCYTES # BLD AUTO: 0.41 X10(3) UL (ref 0.1–1)
MONOCYTES NFR BLD AUTO: 7.8 %
NEUTROPHILS # BLD AUTO: 2.53 X10 (3) UL (ref 1.5–7.7)
NEUTROPHILS # BLD AUTO: 2.53 X10(3) UL (ref 1.5–7.7)
NEUTROPHILS NFR BLD AUTO: 48.1 %
NONHDLC SERPL-MCNC: 90 MG/DL (ref ?–130)
OSMOLALITY SERPL CALC.SUM OF ELEC: 294 MOSM/KG (ref 275–295)
PLATELET # BLD AUTO: 256 10(3)UL (ref 150–450)
POTASSIUM SERPL-SCNC: 4.4 MMOL/L (ref 3.5–5.1)
RBC # BLD AUTO: 4.34 X10(6)UL (ref 3.8–5.3)
SODIUM SERPL-SCNC: 142 MMOL/L (ref 136–145)
TRIGL SERPL-MCNC: 45 MG/DL (ref 30–149)
TSI SER-ACNC: 1.73 MIU/ML (ref 0.36–3.74)
WBC # BLD AUTO: 5.3 X10(3) UL (ref 4–11)

## 2019-02-18 PROCEDURE — 80053 COMPREHEN METABOLIC PANEL: CPT

## 2019-02-18 PROCEDURE — 85025 COMPLETE CBC W/AUTO DIFF WBC: CPT

## 2019-02-18 PROCEDURE — 99395 PREV VISIT EST AGE 18-39: CPT | Performed by: INTERNAL MEDICINE

## 2019-02-18 PROCEDURE — 80061 LIPID PANEL: CPT

## 2019-02-18 PROCEDURE — 84443 ASSAY THYROID STIM HORMONE: CPT

## 2019-02-18 PROCEDURE — 36415 COLL VENOUS BLD VENIPUNCTURE: CPT

## 2019-02-18 NOTE — PROGRESS NOTES
Nupur Rios is a 35year old female. Patient presents with:  Physical      HPI:     HPI  Patient is a 51-year-old female here for physical.  She has history of vitamin D deficiency, ASCUS and HPV positive state status post LEEP in 2015.   Most recent P headaches. Endo/Heme/Allergies: Negative for environmental allergies. Psychiatric/Behavioral: Negative for depression. The patient is not nervous/anxious and does not have insomnia.           EXAM:   /85 (BP Location: Right arm, Patient Position: all orders for this visit:    Annual physical exam  -     CBC WITH DIFFERENTIAL WITH PLATELET; Future  -     COMP METABOLIC PANEL (14); Future  -     TSH W REFLEX TO FREE T4; Future  -     LIPID PANEL; Future    Labs ordered.   Normal physical exam.  Recomm

## 2019-02-21 ENCOUNTER — OFFICE VISIT (OUTPATIENT)
Dept: OBGYN CLINIC | Facility: CLINIC | Age: 34
End: 2019-02-21

## 2019-02-21 VITALS
HEART RATE: 65 BPM | WEIGHT: 199 LBS | BODY MASS INDEX: 31 KG/M2 | SYSTOLIC BLOOD PRESSURE: 121 MMHG | DIASTOLIC BLOOD PRESSURE: 84 MMHG

## 2019-02-21 DIAGNOSIS — N94.9 VAGINAL LUMP: Primary | ICD-10-CM

## 2019-02-21 PROCEDURE — 99212 OFFICE O/P EST SF 10 MIN: CPT | Performed by: OBSTETRICS & GYNECOLOGY

## 2019-03-28 ENCOUNTER — OFFICE VISIT (OUTPATIENT)
Dept: INTERNAL MEDICINE CLINIC | Facility: CLINIC | Age: 34
End: 2019-03-28

## 2019-03-28 VITALS
HEART RATE: 99 BPM | SYSTOLIC BLOOD PRESSURE: 120 MMHG | HEIGHT: 67 IN | TEMPERATURE: 98 F | WEIGHT: 195.81 LBS | BODY MASS INDEX: 30.73 KG/M2 | DIASTOLIC BLOOD PRESSURE: 87 MMHG

## 2019-03-28 DIAGNOSIS — J40 BRONCHITIS: Primary | ICD-10-CM

## 2019-03-28 PROCEDURE — 99212 OFFICE O/P EST SF 10 MIN: CPT | Performed by: PHYSICIAN ASSISTANT

## 2019-03-28 PROCEDURE — 99213 OFFICE O/P EST LOW 20 MIN: CPT | Performed by: PHYSICIAN ASSISTANT

## 2019-03-28 RX ORDER — AZITHROMYCIN 250 MG/1
TABLET, FILM COATED ORAL
Qty: 6 TABLET | Refills: 0 | Status: SHIPPED | OUTPATIENT
Start: 2019-03-28 | End: 2019-04-30 | Stop reason: ALTCHOICE

## 2019-03-28 RX ORDER — BENZONATATE 100 MG/1
100 CAPSULE ORAL 3 TIMES DAILY PRN
Qty: 30 CAPSULE | Refills: 0 | Status: SHIPPED | OUTPATIENT
Start: 2019-03-28 | End: 2019-04-30 | Stop reason: ALTCHOICE

## 2019-03-28 NOTE — PROGRESS NOTES
HPI:    Patient ID: Sherwin Winston is a 35year old female. HPI   Patient presents today c/o cough for the last several days. She had more flu like symptoms last week and now mostly cough. Symptoms are getting worse.  She has chest congestion but no pro motion. Neck supple. Cardiovascular: Normal rate, regular rhythm and normal heart sounds. Pulmonary/Chest: Effort normal and breath sounds normal. She has no wheezes. She has no rales. Lymphadenopathy:     She has no cervical adenopathy.    Neurologi

## 2019-04-30 ENCOUNTER — OFFICE VISIT (OUTPATIENT)
Dept: INTERNAL MEDICINE CLINIC | Facility: CLINIC | Age: 34
End: 2019-04-30

## 2019-04-30 VITALS
RESPIRATION RATE: 18 BRPM | HEART RATE: 73 BPM | SYSTOLIC BLOOD PRESSURE: 119 MMHG | HEIGHT: 67 IN | BODY MASS INDEX: 31 KG/M2 | DIASTOLIC BLOOD PRESSURE: 84 MMHG | WEIGHT: 197.5 LBS | TEMPERATURE: 98 F

## 2019-04-30 DIAGNOSIS — E04.9 GOITER: Primary | ICD-10-CM

## 2019-04-30 DIAGNOSIS — J35.8 TONSIL STONE: ICD-10-CM

## 2019-04-30 DIAGNOSIS — K21.9 GASTROESOPHAGEAL REFLUX DISEASE WITHOUT ESOPHAGITIS: ICD-10-CM

## 2019-04-30 PROCEDURE — 99212 OFFICE O/P EST SF 10 MIN: CPT | Performed by: INTERNAL MEDICINE

## 2019-04-30 PROCEDURE — 99214 OFFICE O/P EST MOD 30 MIN: CPT | Performed by: INTERNAL MEDICINE

## 2019-04-30 NOTE — PROGRESS NOTES
HPI:    Patient ID: Deandre Galvan is a 35year old female.   Patient presents with:  Sore Throat: Pt state that she has white spots on the right side of her throat that has been present for the past month and has tenderness in her throat    Patient philip Constitutional: She is oriented to person, place, and time. She appears well-developed and well-nourished. No distress. HENT:   Head: Normocephalic and atraumatic.    Right Ear: Tympanic membrane, external ear and ear canal normal.   Left Ear: Tympanic me Blood pressure 119/84, pulse 73, temperature 98.2 °F (36.8 °C), temperature source Oral, resp. rate 18, height 5' 7\" (1.702 m), weight 197 lb 8 oz (89.6 kg), last menstrual period 04/03/2019, currently breastfeeding.                ASSESSMENT/PLAN:   [employer]

## 2019-05-01 ENCOUNTER — HOSPITAL ENCOUNTER (OUTPATIENT)
Dept: ULTRASOUND IMAGING | Age: 34
Discharge: HOME | End: 2019-05-01
Attending: INTERNAL MEDICINE
Payer: COMMERCIAL

## 2019-05-01 DIAGNOSIS — E04.9 GOITER: ICD-10-CM

## 2019-05-01 PROCEDURE — 76536 US EXAM OF HEAD AND NECK: CPT | Performed by: INTERNAL MEDICINE

## 2019-05-03 ENCOUNTER — TELEPHONE (OUTPATIENT)
Dept: INTERNAL MEDICINE CLINIC | Facility: CLINIC | Age: 34
End: 2019-05-03

## 2019-05-03 NOTE — TELEPHONE ENCOUNTER
US  Thyroid     Showing     1 .    2  Very small  benign  Looking cystic lesions in the interpolar and lower pole region left lobe of the thyroid gland     2      No suspicious lesions visualized   .       Recheck US    Thyroid  In 1 year           F/u vis

## 2019-05-07 ENCOUNTER — OFFICE VISIT (OUTPATIENT)
Dept: INTERNAL MEDICINE CLINIC | Facility: CLINIC | Age: 34
End: 2019-05-07

## 2019-05-07 VITALS
WEIGHT: 197.88 LBS | HEIGHT: 67 IN | TEMPERATURE: 99 F | BODY MASS INDEX: 31.06 KG/M2 | RESPIRATION RATE: 18 BRPM | SYSTOLIC BLOOD PRESSURE: 125 MMHG | HEART RATE: 71 BPM | DIASTOLIC BLOOD PRESSURE: 81 MMHG

## 2019-05-07 DIAGNOSIS — E04.1 BENIGN THYROID CYST: Primary | ICD-10-CM

## 2019-05-07 DIAGNOSIS — F41.9 ANXIETY: ICD-10-CM

## 2019-05-07 PROCEDURE — 99212 OFFICE O/P EST SF 10 MIN: CPT | Performed by: INTERNAL MEDICINE

## 2019-05-07 PROCEDURE — 99214 OFFICE O/P EST MOD 30 MIN: CPT | Performed by: INTERNAL MEDICINE

## 2019-05-07 NOTE — PROGRESS NOTES
HPI:    Patient ID: Anabela John is a 35year old female. Patient presents with:  Test Results: Pt state that she is f/u from recent US of thyroid and that she is anxoius with questions    Patient in office today for  Thyroid  US follow up visit.  State normal.   Nose: Nose normal. Right sinus exhibits no maxillary sinus tenderness and no frontal sinus tenderness. Left sinus exhibits no maxillary sinus tenderness and no frontal sinus tenderness.    Mouth/Throat: Uvula is midline and oropharynx is clear and Imaging & Referrals:  None       #[id number]

## 2019-05-29 ENCOUNTER — OFFICE VISIT (OUTPATIENT)
Dept: OBGYN CLINIC | Facility: CLINIC | Age: 34
End: 2019-05-29

## 2019-05-29 VITALS
SYSTOLIC BLOOD PRESSURE: 125 MMHG | HEART RATE: 68 BPM | WEIGHT: 197 LBS | DIASTOLIC BLOOD PRESSURE: 82 MMHG | BODY MASS INDEX: 31 KG/M2

## 2019-05-29 DIAGNOSIS — Z01.419 ENCOUNTER FOR GYNECOLOGICAL EXAMINATION WITHOUT ABNORMAL FINDING: Primary | ICD-10-CM

## 2019-05-29 DIAGNOSIS — Z12.4 SCREENING FOR MALIGNANT NEOPLASM OF CERVIX: ICD-10-CM

## 2019-05-29 DIAGNOSIS — Z00.00 ANNUAL PHYSICAL EXAM: ICD-10-CM

## 2019-05-29 PROCEDURE — 99395 PREV VISIT EST AGE 18-39: CPT | Performed by: OBSTETRICS & GYNECOLOGY

## 2019-05-29 PROCEDURE — 87624 HPV HI-RISK TYP POOLED RSLT: CPT | Performed by: OBSTETRICS & GYNECOLOGY

## 2019-05-31 PROBLEM — O47.9 IRREGULAR CONTRACTIONS (HCC): Status: RESOLVED | Noted: 2017-07-20 | Resolved: 2019-05-31

## 2019-05-31 PROBLEM — Z28.3 RUBELLA NON-IMMUNE STATUS, ANTEPARTUM: Status: RESOLVED | Noted: 2017-03-20 | Resolved: 2019-05-31

## 2019-05-31 PROBLEM — O99.891 RUBELLA NON-IMMUNE STATUS, ANTEPARTUM: Status: RESOLVED | Noted: 2017-03-20 | Resolved: 2019-05-31

## 2019-05-31 PROBLEM — Z28.39 RUBELLA NON-IMMUNE STATUS, ANTEPARTUM (HCC): Status: RESOLVED | Noted: 2017-03-20 | Resolved: 2019-05-31

## 2019-05-31 PROBLEM — Z28.39 RUBELLA NON-IMMUNE STATUS, ANTEPARTUM: Status: RESOLVED | Noted: 2017-03-20 | Resolved: 2019-05-31

## 2019-05-31 PROBLEM — O47.9 IRREGULAR CONTRACTIONS: Status: RESOLVED | Noted: 2017-07-20 | Resolved: 2019-05-31

## 2019-05-31 PROBLEM — Z34.90 PREGNANCY: Status: RESOLVED | Noted: 2017-10-28 | Resolved: 2019-05-31

## 2019-05-31 PROBLEM — Z34.90 PREGNANCY (HCC): Status: RESOLVED | Noted: 2017-10-28 | Resolved: 2019-05-31

## 2019-05-31 PROBLEM — O09.899 RUBELLA NON-IMMUNE STATUS, ANTEPARTUM (HCC): Status: RESOLVED | Noted: 2017-03-20 | Resolved: 2019-05-31

## 2019-05-31 PROBLEM — O09.899 RUBELLA NON-IMMUNE STATUS, ANTEPARTUM: Status: RESOLVED | Noted: 2017-03-20 | Resolved: 2019-05-31

## 2019-06-01 NOTE — PROGRESS NOTES
HPI:    Patient ID: Al Wade is a 35year old female. HPI   with monthly menses / 4d / normal. No BC since delivery 18 months ago. Only issue on ROS is some intermittent lateral right breast pain. Stopped lactation in February.      Review Cervix exhibits no motion tenderness and no discharge. Right adnexum displays no mass, no tenderness and no fullness. Left adnexum displays no mass, no tenderness and no fullness. No vaginal discharge found.    Musculoskeletal: She exhibits no edema or tend

## 2019-06-04 ENCOUNTER — TELEPHONE (OUTPATIENT)
Dept: OBGYN CLINIC | Facility: CLINIC | Age: 34
End: 2019-06-04

## 2019-06-04 DIAGNOSIS — Z00.00 ANNUAL PHYSICAL EXAM: Primary | ICD-10-CM

## 2019-06-04 NOTE — TELEPHONE ENCOUNTER
----- Message from Monica Hurd DO sent at 5/31/2019 11:29 PM CDT -----  I don't see HPV result.  Please make sure this was ordered

## 2019-06-04 NOTE — TELEPHONE ENCOUNTER
Called lab and talked to [place]. She states that the HPV result was not ordered. She stated to put in an order for HPV and she will have it done. Entered order for the lab.

## 2019-07-10 ENCOUNTER — TELEPHONE (OUTPATIENT)
Dept: OBGYN CLINIC | Facility: CLINIC | Age: 34
End: 2019-07-10

## 2019-07-10 NOTE — TELEPHONE ENCOUNTER
Pt confirms +HPT and LMP 5/30. Pt reports normal cycles, q 28 days. Pt will start PNV with DHA. Pt delivered here in 2017 and is aware she will see all 6 MDs and first is with a nurse. OBN appt made, pt to arrive 10 min early for upt.  Address and parking

## 2019-07-24 ENCOUNTER — NURSE ONLY (OUTPATIENT)
Dept: OBGYN CLINIC | Facility: CLINIC | Age: 34
End: 2019-07-24

## 2019-07-24 VITALS — HEIGHT: 67.5 IN | WEIGHT: 202.81 LBS | BODY MASS INDEX: 31.46 KG/M2

## 2019-07-24 DIAGNOSIS — Z34.91 ENCOUNTER FOR SUPERVISION OF NORMAL PREGNANCY IN FIRST TRIMESTER, UNSPECIFIED GRAVIDITY: Primary | ICD-10-CM

## 2019-07-24 LAB
CONTROL LINE PRESENT WITH A CLEAR BACKGROUND (YES/NO): YES YES/NO
KIT LOT #: NORMAL NUMERIC

## 2019-07-24 PROCEDURE — 81025 URINE PREGNANCY TEST: CPT | Performed by: OBSTETRICS & GYNECOLOGY

## 2019-07-24 NOTE — PROGRESS NOTES
Pt seen for OBN appt today with no complaints. Pt states she has not started PNV. Advised pt to start PNV that includes DHA, folic acid, and iron. Normal PN labs and 1 hr gtt ordered. Pt advised all labs must be completed and resulted prior to MD appt.  As Mediterranean, or  background):  MCV less than 80: No   Neural tube defect (Meningomyelocele, Spina bifida, or Anencephaly): No   Congenital heart defect: No   Down syndrome: No   Marcelino-Sachs (Ashkenazi [place], [place], [place]): No   Canavan disea

## 2019-07-26 ENCOUNTER — LAB ENCOUNTER (OUTPATIENT)
Dept: LAB | Facility: HOSPITAL | Age: 34
End: 2019-07-26
Attending: OBSTETRICS & GYNECOLOGY
Payer: COMMERCIAL

## 2019-07-26 DIAGNOSIS — Z34.91 ENCOUNTER FOR SUPERVISION OF NORMAL PREGNANCY IN FIRST TRIMESTER, UNSPECIFIED GRAVIDITY: ICD-10-CM

## 2019-07-26 LAB
ANTIBODY SCREEN: NEGATIVE
BASOPHILS # BLD AUTO: 0.03 X10(3) UL (ref 0–0.2)
BASOPHILS NFR BLD AUTO: 0.4 %
DEPRECATED RDW RBC AUTO: 43.4 FL (ref 35.1–46.3)
EOSINOPHIL # BLD AUTO: 0.04 X10(3) UL (ref 0–0.7)
EOSINOPHIL NFR BLD AUTO: 0.6 %
ERYTHROCYTE [DISTWIDTH] IN BLOOD BY AUTOMATED COUNT: 12.7 % (ref 11–15)
GLUCOSE 1H P GLC SERPL-MCNC: 66 MG/DL
HBV SURFACE AG SER-ACNC: <0.1 [IU]/L
HBV SURFACE AG SERPL QL IA: NONREACTIVE
HCT VFR BLD AUTO: 40.7 % (ref 35–48)
HGB BLD-MCNC: 13.6 G/DL (ref 12–16)
IMM GRANULOCYTES # BLD AUTO: 0.02 X10(3) UL (ref 0–1)
IMM GRANULOCYTES NFR BLD: 0.3 %
LYMPHOCYTES # BLD AUTO: 1.77 X10(3) UL (ref 1–4)
LYMPHOCYTES NFR BLD AUTO: 25 %
MCH RBC QN AUTO: 30.8 PG (ref 26–34)
MCHC RBC AUTO-ENTMCNC: 33.4 G/DL (ref 31–37)
MCV RBC AUTO: 92.1 FL (ref 80–100)
MONOCYTES # BLD AUTO: 0.69 X10(3) UL (ref 0.1–1)
MONOCYTES NFR BLD AUTO: 9.8 %
NEUTROPHILS # BLD AUTO: 4.52 X10 (3) UL (ref 1.5–7.7)
NEUTROPHILS # BLD AUTO: 4.52 X10(3) UL (ref 1.5–7.7)
NEUTROPHILS NFR BLD AUTO: 63.9 %
PLATELET # BLD AUTO: 243 10(3)UL (ref 150–450)
RBC # BLD AUTO: 4.42 X10(6)UL (ref 3.8–5.3)
RH BLOOD TYPE: POSITIVE
RUBV IGG SER QL: POSITIVE
RUBV IGG SER-ACNC: 56.8 IU/ML (ref 10–?)
T PALLIDUM AB SER QL: NEGATIVE
WBC # BLD AUTO: 7.1 X10(3) UL (ref 4–11)

## 2019-07-26 PROCEDURE — 86850 RBC ANTIBODY SCREEN: CPT

## 2019-07-26 PROCEDURE — 86780 TREPONEMA PALLIDUM: CPT

## 2019-07-26 PROCEDURE — 87086 URINE CULTURE/COLONY COUNT: CPT

## 2019-07-26 PROCEDURE — 86900 BLOOD TYPING SEROLOGIC ABO: CPT

## 2019-07-26 PROCEDURE — 82950 GLUCOSE TEST: CPT

## 2019-07-26 PROCEDURE — 86901 BLOOD TYPING SEROLOGIC RH(D): CPT

## 2019-07-26 PROCEDURE — 86762 RUBELLA ANTIBODY: CPT

## 2019-07-26 PROCEDURE — 85025 COMPLETE CBC W/AUTO DIFF WBC: CPT

## 2019-07-26 PROCEDURE — 87340 HEPATITIS B SURFACE AG IA: CPT

## 2019-07-26 PROCEDURE — 36415 COLL VENOUS BLD VENIPUNCTURE: CPT

## 2019-07-26 PROCEDURE — 87389 HIV-1 AG W/HIV-1&-2 AB AG IA: CPT

## 2019-08-02 ENCOUNTER — TELEPHONE (OUTPATIENT)
Dept: OBGYN CLINIC | Facility: CLINIC | Age: 34
End: 2019-08-02

## 2019-08-02 ENCOUNTER — INITIAL PRENATAL (OUTPATIENT)
Dept: OBGYN CLINIC | Facility: CLINIC | Age: 34
End: 2019-08-02

## 2019-08-02 VITALS
HEART RATE: 86 BPM | SYSTOLIC BLOOD PRESSURE: 111 MMHG | BODY MASS INDEX: 31 KG/M2 | WEIGHT: 198 LBS | DIASTOLIC BLOOD PRESSURE: 73 MMHG

## 2019-08-02 DIAGNOSIS — Z34.91 ENCOUNTER FOR SUPERVISION OF NORMAL PREGNANCY IN FIRST TRIMESTER, UNSPECIFIED GRAVIDITY: Primary | ICD-10-CM

## 2019-08-02 DIAGNOSIS — Z3A.10 10 WEEKS GESTATION OF PREGNANCY: Primary | ICD-10-CM

## 2019-08-02 PROBLEM — E04.9 GOITER: Status: RESOLVED | Noted: 2019-04-30 | Resolved: 2019-08-02

## 2019-08-02 PROBLEM — Z13.79 GENETIC TESTING: Status: RESOLVED | Noted: 2017-03-20 | Resolved: 2019-08-02

## 2019-08-02 PROBLEM — Z87.891 FORMER SMOKER: Status: RESOLVED | Noted: 2017-03-20 | Resolved: 2019-08-02

## 2019-08-02 LAB
MULTISTIX LOT#: NORMAL NUMERIC
PH, URINE: 6.5 (ref 4.5–8)
SPECIFIC GRAVITY: 1.02 (ref 1–1.03)

## 2019-08-02 PROCEDURE — 81002 URINALYSIS NONAUTO W/O SCOPE: CPT | Performed by: OBSTETRICS & GYNECOLOGY

## 2019-08-02 PROCEDURE — 76815 OB US LIMITED FETUS(S): CPT | Performed by: OBSTETRICS & GYNECOLOGY

## 2019-08-04 LAB
C TRACH DNA SPEC QL NAA+PROBE: NEGATIVE
N GONORRHOEA DNA SPEC QL NAA+PROBE: NEGATIVE

## 2019-08-05 LAB — T VAGINALIS RRNA SPEC QL NAA+PROBE: NEGATIVE

## 2019-08-14 ENCOUNTER — TELEPHONE (OUTPATIENT)
Dept: INTERNAL MEDICINE CLINIC | Facility: CLINIC | Age: 34
End: 2019-08-14

## 2019-08-14 DIAGNOSIS — D22.4 NEVUS OF SCALP: Primary | ICD-10-CM

## 2019-08-14 NOTE — TELEPHONE ENCOUNTER
Please advise on referrel- patient has an appointment tomorrow 8/15 with Dr. Laron Singh for a skin cancer screening.

## 2019-08-15 ENCOUNTER — OFFICE VISIT (OUTPATIENT)
Dept: DERMATOLOGY CLINIC | Facility: CLINIC | Age: 34
End: 2019-08-15

## 2019-08-15 DIAGNOSIS — D23.60 BENIGN NEOPLASM OF SKIN OF UPPER EXTREMITY AND SHOULDER, UNSPECIFIED LATERALITY: ICD-10-CM

## 2019-08-15 DIAGNOSIS — D23.30 BENIGN NEOPLASM OF SKIN OF FACE: ICD-10-CM

## 2019-08-15 DIAGNOSIS — D23.4 BENIGN NEOPLASM OF SCALP AND SKIN OF NECK: ICD-10-CM

## 2019-08-15 DIAGNOSIS — D23.70 BENIGN NEOPLASM OF SKIN OF LOWER EXTREMITY, INCLUDING HIP, UNSPECIFIED LATERALITY: ICD-10-CM

## 2019-08-15 DIAGNOSIS — D22.4 IRRITATED NEVUS OF SCALP: Primary | ICD-10-CM

## 2019-08-15 DIAGNOSIS — D23.5 BENIGN NEOPLASM OF SKIN OF TRUNK, EXCEPT SCROTUM: ICD-10-CM

## 2019-08-15 DIAGNOSIS — L81.4 SOLAR LENTIGO: ICD-10-CM

## 2019-08-15 PROCEDURE — 88305 TISSUE EXAM BY PATHOLOGIST: CPT | Performed by: DERMATOLOGY

## 2019-08-15 PROCEDURE — 99213 OFFICE O/P EST LOW 20 MIN: CPT | Performed by: DERMATOLOGY

## 2019-08-15 PROCEDURE — 11421 EXC H-F-NK-SP B9+MARG 0.6-1: CPT | Performed by: DERMATOLOGY

## 2019-08-15 NOTE — PROCEDURES
Procedural Report for Shave Excision    Procedure: With the patient is a supine position, the skin was scrubbed with alcohol. Anesthesia was obtained by injecting 2 mL of 1% Xylocaine with Epinephrine.   The skin surrounding the lession was placed under

## 2019-08-15 NOTE — PROGRESS NOTES
HPI:     Chief Complaint     Full Skin Exam        HPI     Full Skin Exam      Additional comments: LOV Pt presenting for full body skin exam. Pt denies personal and family hx of skin cancer.            Last edited by Rhea Seaman LPN on 1/13/9649  9:2 Highest education level: Not on file    Occupational History      Not on file    Social Needs      Financial resource strain: Not on file      Food insecurity:        Worry: Not on file        Inability: Not on file      Transportation needs:        Medic Pt has a pacemaker: No        Pt has a defibrillator: No        Breast feeding: Not Asked        Reaction to local anesthetic: No    Social History Narrative      Not on file    Family History   Problem Relation Age of Onset   • Breast Cancer Paternal Aunt including hip, unspecified laterality    Orders Placed This Encounter      EXC SKIN BENIG 0.6-1CM REMAINDR BODY      Specimen to Pathology, Tissue [IHP Pt to [address]      Results From Past 48 Hours:  No results found for this or any previous visit (fro

## 2019-08-16 NOTE — PROGRESS NOTES
Outpatient Maternal-Fetal Medicine Consultation    Dear Dr. Delle Aschoff    Thank you for requesting ultrasound evaluation and maternal fetal medicine consultation on your patient Kyler Williamson.   As you are aware she is a 35year old female with a Sutton pregn edema    OBSTETRIC ULTRASOUND  A first trimester ultrasound was performed prior to the consultation which I interpreted and discussed with the patient and her significant other, Connor Purcell.   The CRL is consistent with dates and the NT measurement is normal.  The available if a screen returns as high risk. We discussed the association between increased NT with aneuploidy but also structural malformations such as cardiac and skeletal defects.   When further studies are performed in evaluation of a high risk first tr complications. Women with weight loss or insufficient weight gain have higher rates of small for gestational age infants.     A recent study found that initiating moderate exercise in early pregnancy for obese gravidas significantly reduced the incidence o

## 2019-08-23 ENCOUNTER — HOSPITAL ENCOUNTER (OUTPATIENT)
Dept: PERINATAL CARE | Facility: HOSPITAL | Age: 34
Discharge: HOME | End: 2019-08-23
Attending: OBSTETRICS & GYNECOLOGY
Payer: COMMERCIAL

## 2019-08-23 VITALS
WEIGHT: 200 LBS | SYSTOLIC BLOOD PRESSURE: 109 MMHG | HEART RATE: 74 BPM | DIASTOLIC BLOOD PRESSURE: 71 MMHG | BODY MASS INDEX: 31 KG/M2

## 2019-08-23 DIAGNOSIS — D26.7 PLACENTA CHORIOANGIOMA IN FIRST TRIMESTER: ICD-10-CM

## 2019-08-23 DIAGNOSIS — Z98.890 H/O LEEP: ICD-10-CM

## 2019-08-23 DIAGNOSIS — O43.191 PLACENTA CHORIOANGIOMA IN FIRST TRIMESTER: ICD-10-CM

## 2019-08-23 DIAGNOSIS — Z36.9 FIRST TRIMESTER SCREENING: Primary | ICD-10-CM

## 2019-08-23 DIAGNOSIS — Z36.9 FIRST TRIMESTER SCREENING: ICD-10-CM

## 2019-08-23 DIAGNOSIS — Z36.9 1ST TRIMESTER SCREENING: ICD-10-CM

## 2019-08-23 PROCEDURE — 99243 OFF/OP CNSLTJ NEW/EST LOW 30: CPT | Performed by: OBSTETRICS & GYNECOLOGY

## 2019-08-23 PROCEDURE — 76813 OB US NUCHAL MEAS 1 GEST: CPT | Performed by: OBSTETRICS & GYNECOLOGY

## 2019-08-28 ENCOUNTER — TELEPHONE (OUTPATIENT)
Dept: PERINATAL CARE | Facility: HOSPITAL | Age: 34
End: 2019-08-28

## 2019-08-28 NOTE — TELEPHONE ENCOUNTER
Recd FTS results and Dr Sanon Presume reviewed and signed off  Left message for Birdie Rousseau to  inform her that the probability for Trisomy 13,18 and 21  after screening is 1 in > 10,000 .   These results within range  Pt left callback number if patient has any questio

## 2019-08-29 ENCOUNTER — ROUTINE PRENATAL (OUTPATIENT)
Dept: OBGYN CLINIC | Facility: CLINIC | Age: 34
End: 2019-08-29

## 2019-08-29 VITALS
HEART RATE: 74 BPM | BODY MASS INDEX: 31 KG/M2 | SYSTOLIC BLOOD PRESSURE: 113 MMHG | WEIGHT: 200 LBS | DIASTOLIC BLOOD PRESSURE: 74 MMHG

## 2019-08-29 DIAGNOSIS — Z34.91 ENCOUNTER FOR SUPERVISION OF NORMAL PREGNANCY IN FIRST TRIMESTER, UNSPECIFIED GRAVIDITY: Primary | ICD-10-CM

## 2019-08-29 LAB
APPEARANCE: CLEAR
MULTISTIX LOT#: NORMAL NUMERIC
PH, URINE: 5 (ref 4.5–8)
SPECIFIC GRAVITY: 1.03 (ref 1–1.03)
URINE-COLOR: YELLOW

## 2019-08-29 PROCEDURE — 81002 URINALYSIS NONAUTO W/O SCOPE: CPT | Performed by: OBSTETRICS & GYNECOLOGY

## 2019-09-10 ENCOUNTER — TELEPHONE (OUTPATIENT)
Dept: OBGYN CLINIC | Facility: CLINIC | Age: 34
End: 2019-09-10

## 2019-09-10 ENCOUNTER — HOSPITAL ENCOUNTER (EMERGENCY)
Facility: HOSPITAL | Age: 34
Discharge: HOME | End: 2019-09-10
Attending: EMERGENCY MEDICINE
Payer: COMMERCIAL

## 2019-09-10 VITALS
OXYGEN SATURATION: 99 % | DIASTOLIC BLOOD PRESSURE: 66 MMHG | HEART RATE: 89 BPM | SYSTOLIC BLOOD PRESSURE: 142 MMHG | RESPIRATION RATE: 18 BRPM | TEMPERATURE: 98 F

## 2019-09-10 DIAGNOSIS — R03.0 ELEVATED BLOOD PRESSURE READING: ICD-10-CM

## 2019-09-10 DIAGNOSIS — G97.1 HEADACHE, LUMBAR PUNCTURE: ICD-10-CM

## 2019-09-10 DIAGNOSIS — R00.2 PALPITATIONS: Primary | ICD-10-CM

## 2019-09-10 LAB
ANION GAP SERPL CALC-SCNC: 9 MMOL/L (ref 0–18)
BASOPHILS # BLD AUTO: 0.01 X10(3) UL (ref 0–0.2)
BASOPHILS NFR BLD AUTO: 0.2 %
BILIRUB UR QL: NEGATIVE
BUN BLD-MCNC: 5 MG/DL (ref 7–18)
BUN/CREAT SERPL: 8.9 (ref 10–20)
CALCIUM BLD-MCNC: 8.8 MG/DL (ref 8.5–10.1)
CHLORIDE SERPL-SCNC: 108 MMOL/L (ref 98–112)
CLARITY UR: CLEAR
CO2 SERPL-SCNC: 22 MMOL/L (ref 21–32)
COLOR UR: YELLOW
CREAT BLD-MCNC: 0.56 MG/DL (ref 0.55–1.02)
DEPRECATED RDW RBC AUTO: 42.6 FL (ref 35.1–46.3)
EOSINOPHIL # BLD AUTO: 0.01 X10(3) UL (ref 0–0.7)
EOSINOPHIL NFR BLD AUTO: 0.2 %
ERYTHROCYTE [DISTWIDTH] IN BLOOD BY AUTOMATED COUNT: 13.2 % (ref 11–15)
GLUCOSE BLD-MCNC: 91 MG/DL (ref 70–99)
GLUCOSE UR-MCNC: NEGATIVE MG/DL
HCT VFR BLD AUTO: 38 % (ref 35–48)
HGB BLD-MCNC: 12.9 G/DL (ref 12–16)
IMM GRANULOCYTES # BLD AUTO: 0.03 X10(3) UL (ref 0–1)
IMM GRANULOCYTES NFR BLD: 0.5 %
KETONES UR-MCNC: 20 MG/DL
LEUKOCYTE ESTERASE UR QL STRIP.AUTO: NEGATIVE
LYMPHOCYTES # BLD AUTO: 1.26 X10(3) UL (ref 1–4)
LYMPHOCYTES NFR BLD AUTO: 19.7 %
MCH RBC QN AUTO: 30.1 PG (ref 26–34)
MCHC RBC AUTO-ENTMCNC: 33.9 G/DL (ref 31–37)
MCV RBC AUTO: 88.6 FL (ref 80–100)
MONOCYTES # BLD AUTO: 0.57 X10(3) UL (ref 0.1–1)
MONOCYTES NFR BLD AUTO: 8.9 %
NEUTROPHILS # BLD AUTO: 4.51 X10 (3) UL (ref 1.5–7.7)
NEUTROPHILS # BLD AUTO: 4.51 X10(3) UL (ref 1.5–7.7)
NEUTROPHILS NFR BLD AUTO: 70.5 %
NITRITE UR QL STRIP.AUTO: NEGATIVE
OSMOLALITY SERPL CALC.SUM OF ELEC: 285 MOSM/KG (ref 275–295)
PH UR: 5.5 [PH] (ref 5–8)
PLATELET # BLD AUTO: 190 10(3)UL (ref 150–450)
POTASSIUM SERPL-SCNC: 4.2 MMOL/L (ref 3.5–5.1)
PROT UR-MCNC: NEGATIVE MG/DL
RBC # BLD AUTO: 4.29 X10(6)UL (ref 3.8–5.3)
RBC #/AREA URNS AUTO: 1 /HPF
SODIUM SERPL-SCNC: 139 MMOL/L (ref 136–145)
SP GR UR STRIP: 1.01 (ref 1–1.03)
TROPONIN I SERPL-MCNC: <0.045 NG/ML (ref ?–0.04)
TSI SER-ACNC: 0.48 MIU/ML (ref 0.36–3.74)
UROBILINOGEN UR STRIP-ACNC: <2
WBC # BLD AUTO: 6.4 X10(3) UL (ref 4–11)
WBC #/AREA URNS AUTO: <1 /HPF

## 2019-09-10 PROCEDURE — 99283 EMERGENCY DEPT VISIT LOW MDM: CPT

## 2019-09-10 PROCEDURE — 84443 ASSAY THYROID STIM HORMONE: CPT | Performed by: EMERGENCY MEDICINE

## 2019-09-10 PROCEDURE — 36415 COLL VENOUS BLD VENIPUNCTURE: CPT

## 2019-09-10 PROCEDURE — 80048 BASIC METABOLIC PNL TOTAL CA: CPT | Performed by: EMERGENCY MEDICINE

## 2019-09-10 PROCEDURE — 85025 COMPLETE CBC W/AUTO DIFF WBC: CPT | Performed by: EMERGENCY MEDICINE

## 2019-09-10 PROCEDURE — 93010 ELECTROCARDIOGRAM REPORT: CPT | Performed by: EMERGENCY MEDICINE

## 2019-09-10 PROCEDURE — 93005 ELECTROCARDIOGRAM TRACING: CPT

## 2019-09-10 PROCEDURE — 84484 ASSAY OF TROPONIN QUANT: CPT

## 2019-09-10 PROCEDURE — 81001 URINALYSIS AUTO W/SCOPE: CPT | Performed by: EMERGENCY MEDICINE

## 2019-09-10 PROCEDURE — 80048 BASIC METABOLIC PNL TOTAL CA: CPT

## 2019-09-10 PROCEDURE — 84484 ASSAY OF TROPONIN QUANT: CPT | Performed by: EMERGENCY MEDICINE

## 2019-09-10 PROCEDURE — 85025 COMPLETE CBC W/AUTO DIFF WBC: CPT

## 2019-09-10 NOTE — TELEPHONE ENCOUNTER
Pt is 14.5 and states since last Thursday she has \"not felt great\". Pt c/o HA that comes and goes. Pt had HA yesterday that started at 2:30 until today. Pt took a Tylenol at 8 AM which helped but the HA has returned.   Pt had been having SOB and heart

## 2019-09-10 NOTE — ED NOTES
Patient is in stable condition. Provided discharge instructions and follow-up information with understanding verbalized. Agrees to seek medical attention for any new or worsening condition.   Patient ambulated from ED with steady gait

## 2019-09-11 ENCOUNTER — TELEPHONE (OUTPATIENT)
Dept: OBGYN CLINIC | Facility: CLINIC | Age: 34
End: 2019-09-11

## 2019-09-11 NOTE — ED PROVIDER NOTES
Patient Seen in: [facility] AND [facility] Emergency Department    History   Patient presents with:  Dyspnea SHAWANDA SOB (respiratory)    Stated Complaint: sob    HPI    Patient complains of palpitations intermittently for past few days, states feels irregular beats Temp src 09/10/19 1226 Temporal   SpO2 09/10/19 1226 100 %   O2 Device 09/10/19 1416 None (Room air)       Current:/66   Pulse 89   Temp 98 °F (36.7 °C) (Temporal)   Resp 18   LMP 05/30/2019 (Exact Date)   SpO2 99%    PULSE OX Nl on room air    GEN RAINBOW DRAW GOLD   CBC W/ DIFFERENTIAL     EKG    Rate, intervals and axes as noted on EKG Report.   Rate: 88  Rhythm: Sinus Rhythm  Reading: Normal intervals, normal EKG             [facility]       Cardiac Monitor: Pulse Readings from Last 1 Encounters:  09/1

## 2019-09-11 NOTE — TELEPHONE ENCOUNTER
Pt is 14w6d, calling for urine results. She was seen in the ER yesterday for heart palpitations and they ran a UA. Pt has no urinary symptoms. Informed her UA looks OK but I will forward to CAP on call for review. Pt states the heart palpitations have resolved and she was told to call us for UA result. CAP, do you want pt to be seen for ER f/u sooner than her next PN appt which is 9/27?

## 2019-09-13 ENCOUNTER — TELEPHONE (OUTPATIENT)
Dept: OBGYN CLINIC | Facility: CLINIC | Age: 34
End: 2019-09-13

## 2019-09-13 DIAGNOSIS — D06.9 CARCINOMA IN SITU OF CERVIX, UNSPECIFIED LOCATION: ICD-10-CM

## 2019-09-13 DIAGNOSIS — D25.2 SUBSEROUS LEIOMYOMA OF UTERUS: Primary | ICD-10-CM

## 2019-09-13 NOTE — TELEPHONE ENCOUNTER
----- Message from Lu[facility] sent at 8/23/2019  9:45 AM CDT -----  Pt has a level 2 appt scheduled on 10/18/19.

## 2019-09-27 ENCOUNTER — ROUTINE PRENATAL (OUTPATIENT)
Dept: OBGYN CLINIC | Facility: CLINIC | Age: 34
End: 2019-09-27

## 2019-09-27 VITALS
HEART RATE: 75 BPM | WEIGHT: 200.63 LBS | DIASTOLIC BLOOD PRESSURE: 68 MMHG | SYSTOLIC BLOOD PRESSURE: 119 MMHG | BODY MASS INDEX: 31 KG/M2

## 2019-09-27 DIAGNOSIS — Z34.92 ENCOUNTER FOR SUPERVISION OF NORMAL PREGNANCY IN SECOND TRIMESTER, UNSPECIFIED GRAVIDITY: Primary | ICD-10-CM

## 2019-09-27 LAB
APPEARANCE: CLEAR
MULTISTIX LOT#: NORMAL NUMERIC
PH, URINE: 6 (ref 4.5–8)
SPECIFIC GRAVITY: 1.02 (ref 1–1.03)
URINE-COLOR: YELLOW
UROBILINOGEN,SEMI-QN: 0.2 MG/DL (ref 0–1.9)

## 2019-09-27 PROCEDURE — 81002 URINALYSIS NONAUTO W/O SCOPE: CPT | Performed by: OBSTETRICS & GYNECOLOGY

## 2019-10-13 NOTE — PROGRESS NOTES
Donna Ahn  Dear Dr. Sagrario Arango,     Thank you for requesting ultrasound evaluation and maternal fetal medicine consultation on your patient Jamie Nieves.   As you are aware she is a 35year old female  with a [place] answered to their satisfaction.      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   She is at low risk for cervical insufficiency     RECOMMENDATIONS:  · Raymond Morrow

## 2019-10-18 ENCOUNTER — HOSPITAL ENCOUNTER (OUTPATIENT)
Dept: PERINATAL CARE | Facility: HOSPITAL | Age: 34
Discharge: HOME | End: 2019-10-18
Attending: OBSTETRICS & GYNECOLOGY
Payer: COMMERCIAL

## 2019-10-18 VITALS
HEART RATE: 97 BPM | DIASTOLIC BLOOD PRESSURE: 77 MMHG | WEIGHT: 203 LBS | SYSTOLIC BLOOD PRESSURE: 129 MMHG | BODY MASS INDEX: 31 KG/M2

## 2019-10-18 DIAGNOSIS — O43.102 PLACENTAL ABNORMALITY IN SECOND TRIMESTER: Primary | ICD-10-CM

## 2019-10-18 DIAGNOSIS — O43.102 PLACENTAL ABNORMALITY IN SECOND TRIMESTER: ICD-10-CM

## 2019-10-18 DIAGNOSIS — Z98.890 H/O LEEP: ICD-10-CM

## 2019-10-18 PROCEDURE — 76811 OB US DETAILED SNGL FETUS: CPT | Performed by: OBSTETRICS & GYNECOLOGY

## 2019-10-18 PROCEDURE — 99215 OFFICE O/P EST HI 40 MIN: CPT | Performed by: OBSTETRICS & GYNECOLOGY

## 2019-10-24 ENCOUNTER — ROUTINE PRENATAL (OUTPATIENT)
Dept: OBGYN CLINIC | Facility: CLINIC | Age: 34
End: 2019-10-24

## 2019-10-24 VITALS
WEIGHT: 204 LBS | HEART RATE: 80 BPM | BODY MASS INDEX: 31 KG/M2 | SYSTOLIC BLOOD PRESSURE: 120 MMHG | DIASTOLIC BLOOD PRESSURE: 78 MMHG

## 2019-10-24 DIAGNOSIS — Z34.92 ENCOUNTER FOR SUPERVISION OF NORMAL PREGNANCY IN SECOND TRIMESTER, UNSPECIFIED GRAVIDITY: Primary | ICD-10-CM

## 2019-10-24 PROCEDURE — 81002 URINALYSIS NONAUTO W/O SCOPE: CPT | Performed by: OBSTETRICS & GYNECOLOGY

## 2019-11-01 ENCOUNTER — IMMUNIZATION (OUTPATIENT)
Dept: INTERNAL MEDICINE CLINIC | Facility: CLINIC | Age: 34
End: 2019-11-01

## 2019-11-01 DIAGNOSIS — Z23 NEED FOR VACCINATION: ICD-10-CM

## 2019-11-01 PROCEDURE — 90471 IMMUNIZATION ADMIN: CPT | Performed by: INTERNAL MEDICINE

## 2019-11-01 PROCEDURE — 90686 IIV4 VACC NO PRSV 0.5 ML IM: CPT | Performed by: INTERNAL MEDICINE

## 2019-11-22 ENCOUNTER — ROUTINE PRENATAL (OUTPATIENT)
Dept: OBGYN CLINIC | Facility: CLINIC | Age: 34
End: 2019-11-22

## 2019-11-22 VITALS
HEART RATE: 73 BPM | SYSTOLIC BLOOD PRESSURE: 107 MMHG | BODY MASS INDEX: 17 KG/M2 | WEIGHT: 109.63 LBS | DIASTOLIC BLOOD PRESSURE: 73 MMHG

## 2019-11-22 DIAGNOSIS — Z34.92 ENCOUNTER FOR SUPERVISION OF NORMAL PREGNANCY IN SECOND TRIMESTER, UNSPECIFIED GRAVIDITY: Primary | ICD-10-CM

## 2019-11-22 PROCEDURE — 81002 URINALYSIS NONAUTO W/O SCOPE: CPT | Performed by: OBSTETRICS & GYNECOLOGY

## 2019-12-13 ENCOUNTER — ROUTINE PRENATAL (OUTPATIENT)
Dept: OBGYN CLINIC | Facility: CLINIC | Age: 34
End: 2019-12-13

## 2019-12-13 ENCOUNTER — TELEPHONE (OUTPATIENT)
Dept: OBGYN CLINIC | Facility: CLINIC | Age: 34
End: 2019-12-13

## 2019-12-13 VITALS
WEIGHT: 213 LBS | BODY MASS INDEX: 33 KG/M2 | SYSTOLIC BLOOD PRESSURE: 124 MMHG | DIASTOLIC BLOOD PRESSURE: 76 MMHG | HEART RATE: 93 BPM

## 2019-12-13 DIAGNOSIS — Z34.83 ENCOUNTER FOR SUPERVISION OF OTHER NORMAL PREGNANCY IN THIRD TRIMESTER: Primary | ICD-10-CM

## 2019-12-13 PROCEDURE — 81002 URINALYSIS NONAUTO W/O SCOPE: CPT | Performed by: OBSTETRICS & GYNECOLOGY

## 2019-12-13 NOTE — TELEPHONE ENCOUNTER
Pt dropped off her spouse fmla forms to be completed for paternity. Pt did fill out HIPAA and FCR forms. Pt did pay $25 fee at time of drop off.  Scanned/emailed forms on 12/13

## 2019-12-20 ENCOUNTER — APPOINTMENT (OUTPATIENT)
Dept: LAB | Age: 34
End: 2019-12-20
Attending: OBSTETRICS & GYNECOLOGY
Payer: COMMERCIAL

## 2019-12-20 DIAGNOSIS — Z34.83 ENCOUNTER FOR SUPERVISION OF OTHER NORMAL PREGNANCY IN THIRD TRIMESTER: ICD-10-CM

## 2019-12-20 PROCEDURE — 36415 COLL VENOUS BLD VENIPUNCTURE: CPT

## 2019-12-20 PROCEDURE — 82950 GLUCOSE TEST: CPT

## 2019-12-20 PROCEDURE — 85027 COMPLETE CBC AUTOMATED: CPT

## 2019-12-24 ENCOUNTER — TELEPHONE (OUTPATIENT)
Dept: OBGYN CLINIC | Facility: CLINIC | Age: 34
End: 2019-12-24

## 2019-12-24 NOTE — TELEPHONE ENCOUNTER
Informed pt of results and KCB recs below. Advised pt to take PNV at separate time from PNV. Pt verbalized understanding.

## 2019-12-24 NOTE — TELEPHONE ENCOUNTER
Dr. Heron Hurst    [facility] for spouse    Please sign off on form:  -Highlight the patient and hit \"Chart\" button. -In Chart Review, w/in the Encounter tab - click 1 time on the Telephone call encounter for 12/13/19.  Scroll down the telephone encounter.  -Click

## 2019-12-24 NOTE — TELEPHONE ENCOUNTER
----- Message from Skye Fuentes MD sent at 12/23/2019 12:03 PM CST -----  Please make sure patient is taking slow Fe once daily

## 2019-12-27 ENCOUNTER — ROUTINE PRENATAL (OUTPATIENT)
Dept: OBGYN CLINIC | Facility: CLINIC | Age: 34
End: 2019-12-27

## 2019-12-27 VITALS
DIASTOLIC BLOOD PRESSURE: 76 MMHG | WEIGHT: 220 LBS | HEART RATE: 94 BPM | BODY MASS INDEX: 34 KG/M2 | SYSTOLIC BLOOD PRESSURE: 114 MMHG

## 2019-12-27 DIAGNOSIS — Z34.93 ENCOUNTER FOR SUPERVISION OF NORMAL PREGNANCY IN THIRD TRIMESTER, UNSPECIFIED GRAVIDITY: Primary | ICD-10-CM

## 2019-12-27 DIAGNOSIS — O99.213 OBESITY AFFECTING PREGNANCY IN THIRD TRIMESTER: ICD-10-CM

## 2019-12-27 PROCEDURE — 81002 URINALYSIS NONAUTO W/O SCOPE: CPT | Performed by: OBSTETRICS & GYNECOLOGY

## 2019-12-27 NOTE — TELEPHONE ENCOUNTER
Spouse VIDHI completed and faxed to [address] [place] [address], [phone], pt picked up copy at [facility]

## 2019-12-28 PROBLEM — O99.213 OBESITY AFFECTING PREGNANCY IN THIRD TRIMESTER: Status: ACTIVE | Noted: 2019-12-28

## 2019-12-28 PROBLEM — O99.013 ANEMIA DURING PREGNANCY IN THIRD TRIMESTER: Status: ACTIVE | Noted: 2019-12-28

## 2019-12-28 PROBLEM — O99.213 OBESITY AFFECTING PREGNANCY IN THIRD TRIMESTER (HCC): Status: ACTIVE | Noted: 2019-12-28

## 2019-12-28 PROBLEM — O99.013 ANEMIA DURING PREGNANCY IN THIRD TRIMESTER (HCC): Status: ACTIVE | Noted: 2019-12-28

## 2020-01-02 ENCOUNTER — TELEPHONE (OUTPATIENT)
Dept: OBGYN CLINIC | Facility: CLINIC | Age: 35
End: 2020-01-02

## 2020-01-02 NOTE — TELEPHONE ENCOUNTER
Notified pt of recs below. Also advised pt to try doing PN exercises or meditation to help with anxiety. Advised to call back if no improvement and she wishes to talk to a therapist. Pt agrees.

## 2020-01-02 NOTE — TELEPHONE ENCOUNTER
Pt is 31w0d, reports rash to abdomen. States rash is raised and red and is about half an inch and two other marks are circular and include a few red dots. Reports mild itching but has not had itching for a while.  Pt has NKA and denies fever, body aches and

## 2020-01-02 NOTE — TELEPHONE ENCOUNTER
Pt was told to start an iron supplement a few days ago. Since starting that, she has been nauseous and gassy and when she is nauseous, she is having heart palpitations and having some SOB.   Pt states she only has the heart palpitations and SOB when she is

## 2020-01-07 ENCOUNTER — HOSPITAL ENCOUNTER (OUTPATIENT)
Dept: ULTRASOUND IMAGING | Facility: HOSPITAL | Age: 35
Discharge: HOME | End: 2020-01-07
Attending: OBSTETRICS & GYNECOLOGY
Payer: COMMERCIAL

## 2020-01-07 DIAGNOSIS — O99.213 OBESITY AFFECTING PREGNANCY IN THIRD TRIMESTER: ICD-10-CM

## 2020-01-07 PROCEDURE — 76816 OB US FOLLOW-UP PER FETUS: CPT | Performed by: OBSTETRICS & GYNECOLOGY

## 2020-01-10 ENCOUNTER — ROUTINE PRENATAL (OUTPATIENT)
Dept: OBGYN CLINIC | Facility: CLINIC | Age: 35
End: 2020-01-10

## 2020-01-10 VITALS
WEIGHT: 221.38 LBS | BODY MASS INDEX: 34 KG/M2 | HEART RATE: 106 BPM | SYSTOLIC BLOOD PRESSURE: 113 MMHG | DIASTOLIC BLOOD PRESSURE: 74 MMHG

## 2020-01-10 DIAGNOSIS — Z34.83 ENCOUNTER FOR SUPERVISION OF OTHER NORMAL PREGNANCY IN THIRD TRIMESTER: Primary | ICD-10-CM

## 2020-01-10 LAB
APPEARANCE: CLEAR
MULTISTIX LOT#: ABNORMAL NUMERIC
PH, URINE: 6 (ref 4.5–8)
SPECIFIC GRAVITY: 1.01 (ref 1–1.03)
URINE-COLOR: YELLOW
UROBILINOGEN,SEMI-QN: 0.2 MG/DL (ref 0–1.9)

## 2020-01-10 PROCEDURE — 81002 URINALYSIS NONAUTO W/O SCOPE: CPT | Performed by: OBSTETRICS & GYNECOLOGY

## 2020-01-24 ENCOUNTER — TELEPHONE (OUTPATIENT)
Dept: OBGYN CLINIC | Facility: CLINIC | Age: 35
End: 2020-01-24

## 2020-01-24 ENCOUNTER — ROUTINE PRENATAL (OUTPATIENT)
Dept: OBGYN CLINIC | Facility: CLINIC | Age: 35
End: 2020-01-24

## 2020-01-24 VITALS
BODY MASS INDEX: 35 KG/M2 | WEIGHT: 227 LBS | HEART RATE: 86 BPM | SYSTOLIC BLOOD PRESSURE: 119 MMHG | DIASTOLIC BLOOD PRESSURE: 80 MMHG

## 2020-01-24 DIAGNOSIS — Z34.93 ENCOUNTER FOR SUPERVISION OF NORMAL PREGNANCY IN THIRD TRIMESTER, UNSPECIFIED GRAVIDITY: Primary | ICD-10-CM

## 2020-01-24 PROCEDURE — 81002 URINALYSIS NONAUTO W/O SCOPE: CPT | Performed by: OBSTETRICS & GYNECOLOGY

## 2020-01-24 PROCEDURE — 90715 TDAP VACCINE 7 YRS/> IM: CPT | Performed by: OBSTETRICS & GYNECOLOGY

## 2020-01-24 PROCEDURE — 90471 IMMUNIZATION ADMIN: CPT | Performed by: OBSTETRICS & GYNECOLOGY

## 2020-02-07 ENCOUNTER — ROUTINE PRENATAL (OUTPATIENT)
Dept: OBGYN CLINIC | Facility: CLINIC | Age: 35
End: 2020-02-07

## 2020-02-07 VITALS
WEIGHT: 229 LBS | DIASTOLIC BLOOD PRESSURE: 77 MMHG | HEART RATE: 94 BPM | SYSTOLIC BLOOD PRESSURE: 109 MMHG | BODY MASS INDEX: 35 KG/M2

## 2020-02-07 DIAGNOSIS — Z34.83 ENCOUNTER FOR SUPERVISION OF OTHER NORMAL PREGNANCY IN THIRD TRIMESTER: Primary | ICD-10-CM

## 2020-02-07 LAB
APPEARANCE: CLEAR
MULTISTIX LOT#: NORMAL NUMERIC
URINE-COLOR: YELLOW

## 2020-02-07 PROCEDURE — 81002 URINALYSIS NONAUTO W/O SCOPE: CPT | Performed by: OBSTETRICS & GYNECOLOGY

## 2020-02-11 ENCOUNTER — OFFICE VISIT (OUTPATIENT)
Dept: INTERNAL MEDICINE CLINIC | Facility: CLINIC | Age: 35
End: 2020-02-11

## 2020-02-11 ENCOUNTER — LAB ENCOUNTER (OUTPATIENT)
Dept: LAB | Age: 35
End: 2020-02-11
Attending: OBSTETRICS & GYNECOLOGY
Payer: COMMERCIAL

## 2020-02-11 VITALS
BODY MASS INDEX: 36.36 KG/M2 | WEIGHT: 231.69 LBS | HEART RATE: 74 BPM | DIASTOLIC BLOOD PRESSURE: 85 MMHG | SYSTOLIC BLOOD PRESSURE: 125 MMHG | HEIGHT: 67 IN | TEMPERATURE: 98 F

## 2020-02-11 DIAGNOSIS — E04.1 THYROID NODULE: Primary | ICD-10-CM

## 2020-02-11 DIAGNOSIS — Z34.93 ENCOUNTER FOR SUPERVISION OF NORMAL PREGNANCY IN THIRD TRIMESTER, UNSPECIFIED GRAVIDITY: ICD-10-CM

## 2020-02-11 DIAGNOSIS — E04.1 THYROID CYST: ICD-10-CM

## 2020-02-11 LAB
DEPRECATED RDW RBC AUTO: 57.2 FL (ref 35.1–46.3)
ERYTHROCYTE [DISTWIDTH] IN BLOOD BY AUTOMATED COUNT: 17.4 % (ref 11–15)
HCT VFR BLD AUTO: 37.3 % (ref 35–48)
HGB BLD-MCNC: 12.5 G/DL (ref 12–16)
MCH RBC QN AUTO: 30.3 PG (ref 26–34)
MCHC RBC AUTO-ENTMCNC: 33.5 G/DL (ref 31–37)
MCV RBC AUTO: 90.5 FL (ref 80–100)
PLATELET # BLD AUTO: 196 10(3)UL (ref 150–450)
RBC # BLD AUTO: 4.12 X10(6)UL (ref 3.8–5.3)
WBC # BLD AUTO: 8.9 X10(3) UL (ref 4–11)

## 2020-02-11 PROCEDURE — 99214 OFFICE O/P EST MOD 30 MIN: CPT | Performed by: INTERNAL MEDICINE

## 2020-02-11 PROCEDURE — 36415 COLL VENOUS BLD VENIPUNCTURE: CPT

## 2020-02-11 PROCEDURE — 86780 TREPONEMA PALLIDUM: CPT

## 2020-02-11 PROCEDURE — 87389 HIV-1 AG W/HIV-1&-2 AB AG IA: CPT

## 2020-02-11 PROCEDURE — 85027 COMPLETE CBC AUTOMATED: CPT

## 2020-02-11 NOTE — PROGRESS NOTES
HPI:    Patient ID: Ramakrishna Olivas is a 29year old female.   Patient presents with:  Neck Pain: Pt state that she has been having frontal bilateral neck pain but no pain today      Patient presents today for neck pain states does not have neck pain right headaches. Psychiatric/Behavioral: Negative for confusion and sleep disturbance. The patient is not nervous/anxious. Current Outpatient Medications   Medication Sig Dispense Refill   • Ferrous Sulfate (SLOW FE OR) Take by mouth.      • Prenata Lymphadenopathy:     She has no cervical adenopathy. Neurological: She is alert and oriented to person, place, and time. Skin: Skin is warm and dry.    Psychiatric: Her speech is normal and behavior is normal. Judgment and thought content normal. He

## 2020-02-12 LAB — T PALLIDUM AB SER QL: NEGATIVE

## 2020-02-14 ENCOUNTER — ROUTINE PRENATAL (OUTPATIENT)
Dept: OBGYN CLINIC | Facility: CLINIC | Age: 35
End: 2020-02-14

## 2020-02-14 VITALS
HEART RATE: 93 BPM | BODY MASS INDEX: 36 KG/M2 | DIASTOLIC BLOOD PRESSURE: 80 MMHG | WEIGHT: 231.81 LBS | SYSTOLIC BLOOD PRESSURE: 121 MMHG

## 2020-02-14 DIAGNOSIS — Z34.83 ENCOUNTER FOR SUPERVISION OF OTHER NORMAL PREGNANCY IN THIRD TRIMESTER: Primary | ICD-10-CM

## 2020-02-14 LAB
MULTISTIX EXPIRATION DATE: 2220 DATE
MULTISTIX LOT#: NORMAL NUMERIC
PH, URINE: 6 (ref 4.5–8)
SPECIFIC GRAVITY: 1.02 (ref 1–1.03)
UROBILINOGEN,SEMI-QN: 0.2 MG/DL (ref 0–1.9)

## 2020-02-14 PROCEDURE — 81002 URINALYSIS NONAUTO W/O SCOPE: CPT | Performed by: OBSTETRICS & GYNECOLOGY

## 2020-02-21 ENCOUNTER — ROUTINE PRENATAL (OUTPATIENT)
Dept: OBGYN CLINIC | Facility: CLINIC | Age: 35
End: 2020-02-21

## 2020-02-21 ENCOUNTER — TELEPHONE (OUTPATIENT)
Dept: OBGYN CLINIC | Facility: CLINIC | Age: 35
End: 2020-02-21

## 2020-02-21 VITALS
HEART RATE: 101 BPM | BODY MASS INDEX: 36 KG/M2 | SYSTOLIC BLOOD PRESSURE: 111 MMHG | DIASTOLIC BLOOD PRESSURE: 75 MMHG | WEIGHT: 231 LBS

## 2020-02-21 DIAGNOSIS — Z34.83 ENCOUNTER FOR SUPERVISION OF OTHER NORMAL PREGNANCY IN THIRD TRIMESTER: Primary | ICD-10-CM

## 2020-02-21 LAB
MULTISTIX EXPIRATION DATE: NORMAL DATE
MULTISTIX LOT#: NORMAL NUMERIC
PH, URINE: 6 (ref 4.5–8)
SPECIFIC GRAVITY: 1.03 (ref 1–1.03)
UROBILINOGEN,SEMI-QN: 0.2 MG/DL (ref 0–1.9)

## 2020-02-21 PROCEDURE — 81002 URINALYSIS NONAUTO W/O SCOPE: CPT | Performed by: OBSTETRICS & GYNECOLOGY

## 2020-02-21 NOTE — TELEPHONE ENCOUNTER
Received fax from [facility] requesting authorization for breast pump. Referral request was entered on 1/24/20. Routed to [facility] to please address.

## 2020-02-28 ENCOUNTER — ROUTINE PRENATAL (OUTPATIENT)
Dept: OBGYN CLINIC | Facility: CLINIC | Age: 35
End: 2020-02-28

## 2020-02-28 VITALS
DIASTOLIC BLOOD PRESSURE: 79 MMHG | HEART RATE: 111 BPM | SYSTOLIC BLOOD PRESSURE: 116 MMHG | WEIGHT: 232 LBS | BODY MASS INDEX: 36 KG/M2

## 2020-02-28 DIAGNOSIS — Z34.83 ENCOUNTER FOR SUPERVISION OF OTHER NORMAL PREGNANCY IN THIRD TRIMESTER: Primary | ICD-10-CM

## 2020-02-28 LAB
APPEARANCE: CLEAR
MULTISTIX LOT#: NORMAL NUMERIC
URINE-COLOR: YELLOW

## 2020-02-28 PROCEDURE — 81002 URINALYSIS NONAUTO W/O SCOPE: CPT | Performed by: OBSTETRICS & GYNECOLOGY

## 2020-03-02 ENCOUNTER — HOSPITAL ENCOUNTER (INPATIENT)
Facility: HOSPITAL | Age: 35
LOS: 2 days | Discharge: HOME | End: 2020-03-04
Attending: OBSTETRICS & GYNECOLOGY | Admitting: OBSTETRICS & GYNECOLOGY
Payer: COMMERCIAL

## 2020-03-02 PROBLEM — Z34.90 PREGNANCY (HCC): Status: ACTIVE | Noted: 2020-03-02

## 2020-03-02 PROBLEM — Z36.89 ENCOUNTER FOR TRIAGE IN PREGNANT PATIENT (HCC): Status: ACTIVE | Noted: 2020-03-02

## 2020-03-02 PROBLEM — Z36.89 ENCOUNTER FOR TRIAGE IN PREGNANT PATIENT: Status: ACTIVE | Noted: 2020-03-02

## 2020-03-02 PROBLEM — Z34.90 PREGNANCY: Status: ACTIVE | Noted: 2020-03-02

## 2020-03-02 LAB
ANTIBODY SCREEN: NEGATIVE
DEPRECATED RDW RBC AUTO: 57.9 FL (ref 35.1–46.3)
ERYTHROCYTE [DISTWIDTH] IN BLOOD BY AUTOMATED COUNT: 17.2 % (ref 11–15)
HCT VFR BLD AUTO: 38.1 % (ref 35–48)
HGB BLD-MCNC: 12.9 G/DL (ref 12–16)
MCH RBC QN AUTO: 30.9 PG (ref 26–34)
MCHC RBC AUTO-ENTMCNC: 33.9 G/DL (ref 31–37)
MCV RBC AUTO: 91.4 FL (ref 80–100)
PLATELET # BLD AUTO: 181 10(3)UL (ref 150–450)
RBC # BLD AUTO: 4.17 X10(6)UL (ref 3.8–5.3)
RH BLOOD TYPE: POSITIVE
WBC # BLD AUTO: 11 X10(3) UL (ref 4–11)

## 2020-03-02 PROCEDURE — 59400 OBSTETRICAL CARE: CPT | Performed by: OBSTETRICS & GYNECOLOGY

## 2020-03-02 PROCEDURE — 0KQM0ZZ REPAIR PERINEUM MUSCLE, OPEN APPROACH: ICD-10-PCS | Performed by: OBSTETRICS & GYNECOLOGY

## 2020-03-02 RX ORDER — ACETAMINOPHEN 500 MG
500 TABLET ORAL ONCE AS NEEDED
Status: DISCONTINUED | OUTPATIENT
Start: 2020-03-02 | End: 2020-03-02 | Stop reason: HOSPADM

## 2020-03-02 RX ORDER — CHOLECALCIFEROL (VITAMIN D3) 25 MCG
1 TABLET,CHEWABLE ORAL DAILY
Status: DISCONTINUED | OUTPATIENT
Start: 2020-03-02 | End: 2020-03-04

## 2020-03-02 RX ORDER — IBUPROFEN 600 MG/1
600 TABLET ORAL ONCE AS NEEDED
Status: DISCONTINUED | OUTPATIENT
Start: 2020-03-02 | End: 2020-03-02 | Stop reason: HOSPADM

## 2020-03-02 RX ORDER — SODIUM CHLORIDE 0.9 % (FLUSH) 0.9 %
10 SYRINGE (ML) INJECTION AS NEEDED
Status: DISCONTINUED | OUTPATIENT
Start: 2020-03-02 | End: 2020-03-02 | Stop reason: HOSPADM

## 2020-03-02 RX ORDER — BISACODYL 10 MG
10 SUPPOSITORY, RECTAL RECTAL ONCE AS NEEDED
Status: DISCONTINUED | OUTPATIENT
Start: 2020-03-02 | End: 2020-03-04

## 2020-03-02 RX ORDER — DIPHENHYDRAMINE HYDROCHLORIDE 50 MG/ML
12.5 INJECTION INTRAMUSCULAR; INTRAVENOUS EVERY 4 HOURS PRN
Status: DISCONTINUED | OUTPATIENT
Start: 2020-03-02 | End: 2020-03-04

## 2020-03-02 RX ORDER — AMMONIA INHALANTS 0.04 G/.3ML
0.3 INHALANT RESPIRATORY (INHALATION) AS NEEDED
Status: DISCONTINUED | OUTPATIENT
Start: 2020-03-02 | End: 2020-03-02 | Stop reason: HOSPADM

## 2020-03-02 RX ORDER — IBUPROFEN 600 MG/1
600 TABLET ORAL EVERY 6 HOURS PRN
Status: DISCONTINUED | OUTPATIENT
Start: 2020-03-02 | End: 2020-03-04

## 2020-03-02 RX ORDER — HYDROCODONE BITARTRATE AND ACETAMINOPHEN 5; 325 MG/1; MG/1
1 TABLET ORAL EVERY 6 HOURS PRN
Status: DISCONTINUED | OUTPATIENT
Start: 2020-03-02 | End: 2020-03-04

## 2020-03-02 RX ORDER — AMMONIA INHALANTS 0.04 G/.3ML
0.3 INHALANT RESPIRATORY (INHALATION) AS NEEDED
Status: DISCONTINUED | OUTPATIENT
Start: 2020-03-02 | End: 2020-03-04

## 2020-03-02 RX ORDER — ONDANSETRON 2 MG/ML
4 INJECTION INTRAMUSCULAR; INTRAVENOUS EVERY 6 HOURS PRN
Status: DISCONTINUED | OUTPATIENT
Start: 2020-03-02 | End: 2020-03-02 | Stop reason: HOSPADM

## 2020-03-02 RX ORDER — SODIUM CHLORIDE, SODIUM LACTATE, POTASSIUM CHLORIDE, CALCIUM CHLORIDE 600; 310; 30; 20 MG/100ML; MG/100ML; MG/100ML; MG/100ML
INJECTION, SOLUTION INTRAVENOUS CONTINUOUS
Status: DISCONTINUED | OUTPATIENT
Start: 2020-03-02 | End: 2020-03-02 | Stop reason: HOSPADM

## 2020-03-02 RX ORDER — ONDANSETRON 2 MG/ML
4 INJECTION INTRAMUSCULAR; INTRAVENOUS EVERY 6 HOURS PRN
Status: DISCONTINUED | OUTPATIENT
Start: 2020-03-02 | End: 2020-03-04

## 2020-03-02 RX ORDER — LIDOCAINE HYDROCHLORIDE AND EPINEPHRINE 20; 5 MG/ML; UG/ML
20 INJECTION, SOLUTION EPIDURAL; INFILTRATION; INTRACAUDAL; PERINEURAL ONCE
Status: DISCONTINUED | OUTPATIENT
Start: 2020-03-02 | End: 2020-03-02

## 2020-03-02 RX ORDER — TRISODIUM CITRATE DIHYDRATE AND CITRIC ACID MONOHYDRATE 500; 334 MG/5ML; MG/5ML
30 SOLUTION ORAL AS NEEDED
Status: DISCONTINUED | OUTPATIENT
Start: 2020-03-02 | End: 2020-03-02 | Stop reason: HOSPADM

## 2020-03-02 RX ORDER — DOCUSATE SODIUM 100 MG/1
100 CAPSULE, LIQUID FILLED ORAL 2 TIMES DAILY
Status: DISCONTINUED | OUTPATIENT
Start: 2020-03-02 | End: 2020-03-04

## 2020-03-02 RX ORDER — LIDOCAINE HYDROCHLORIDE 10 MG/ML
30 INJECTION, SOLUTION EPIDURAL; INFILTRATION; INTRACAUDAL; PERINEURAL ONCE
Status: COMPLETED | OUTPATIENT
Start: 2020-03-02 | End: 2020-03-02

## 2020-03-02 RX ORDER — EPHEDRINE SULFATE/0.9% NACL/PF 25 MG/5 ML
5 SYRINGE (ML) INTRAVENOUS AS NEEDED
Status: DISCONTINUED | OUTPATIENT
Start: 2020-03-02 | End: 2020-03-04

## 2020-03-02 RX ORDER — BUPIVACAINE HYDROCHLORIDE 2.5 MG/ML
30 INJECTION, SOLUTION EPIDURAL; INFILTRATION; INTRACAUDAL ONCE
Status: DISCONTINUED | OUTPATIENT
Start: 2020-03-02 | End: 2020-03-02

## 2020-03-02 RX ORDER — SIMETHICONE 80 MG
80 TABLET,CHEWABLE ORAL 3 TIMES DAILY PRN
Status: DISCONTINUED | OUTPATIENT
Start: 2020-03-02 | End: 2020-03-04

## 2020-03-02 RX ORDER — SODIUM CHLORIDE 0.9 % (FLUSH) 0.9 %
10 SYRINGE (ML) INJECTION AS NEEDED
Status: DISCONTINUED | OUTPATIENT
Start: 2020-03-02 | End: 2020-03-04

## 2020-03-02 RX ORDER — DEXTROSE, SODIUM CHLORIDE, SODIUM LACTATE, POTASSIUM CHLORIDE, AND CALCIUM CHLORIDE 5; .6; .31; .03; .02 G/100ML; G/100ML; G/100ML; G/100ML; G/100ML
INJECTION, SOLUTION INTRAVENOUS CONTINUOUS
Status: DISCONTINUED | OUTPATIENT
Start: 2020-03-02 | End: 2020-03-02 | Stop reason: HOSPADM

## 2020-03-02 RX ORDER — DIAPER,BRIEF,INFANT-TODD,DISP
1 EACH MISCELLANEOUS EVERY 6 HOURS PRN
Status: DISCONTINUED | OUTPATIENT
Start: 2020-03-02 | End: 2020-03-04

## 2020-03-02 RX ORDER — TERBUTALINE SULFATE 1 MG/ML
0.25 INJECTION, SOLUTION SUBCUTANEOUS AS NEEDED
Status: DISCONTINUED | OUTPATIENT
Start: 2020-03-02 | End: 2020-03-02 | Stop reason: HOSPADM

## 2020-03-02 NOTE — DISCHARGE SUMMARY
[facility]    Discharge Summary    Ramakrishna Olivas Patient Status:  Inpatient    1985 MRN [id number]   Location [address]  Attending Yin Noland MD   Hosp Day # 0       Admit date:  3/2/2020    Disch

## 2020-03-02 NOTE — L&D DELIVERY NOTE
[facility]OS[facility] - [facility]    Vaginal Delivery Note    Mylinda Hopper Patient Status:  Inpatient    1985 MRN [id number]   Location [address]  Attending Luis Mc MD   Hosp Day # 0 PCP Phillip Giles MD     Delivery

## 2020-03-02 NOTE — LACTATION NOTE
LACTATION NOTE - MOTHER      Evaluation Type: Inpatient    Maternal history  Maternal history: Anemia  Other/comment: history of gastroesophageal reflux, HPV    Breastfeeding goal  Breastfeeding goal: To maintain breast milk feeding per patient goal    Mat

## 2020-03-02 NOTE — PROGRESS NOTES
Patient up to bathroom with assist x 2. Unable to void at this time. Patient transferred to mother/baby room 353 per wheelchair in stable condition with baby and personal belongings. Accompanied by significant other and staff.   Report given to [facility] JAVY

## 2020-03-02 NOTE — H&P
[facility] Patient Status:  Inpatient    1985 MRN [id number]   Location [address]  Attending Rosita Benson MD   Hosp Day # 0 PCP Carter Mendoza MD     Date of Admi Had chicken pox during childhood       Past Surgerical History:   Past Surgical History:   Procedure Laterality Date   • COLPOSCOPY, CERVIX W/UPPER ADJACENT VAGINA; W/BIOPSY(S), CERVIX     • LEEP  2015       Social History: Social History    Tobacco Use CA 8.8 09/10/2019    ALB 3.5 02/18/2019    ALKPHO 67 02/18/2019    BILT 0.4 02/18/2019    TP 7.2 02/18/2019    AST 6 (L) 02/18/2019    ALT 17 02/18/2019    TSH 0.484 09/10/2019    LIP 12 (L) 04/02/2018    TROP <0.045 09/10/2019       Lab Results   Componen

## 2020-03-02 NOTE — PROGRESS NOTES
Pt is a 29year old female admitted to TR1/TR1-A. Patient presents with:  Contractions: feeling contractions that are 10mins apart since 2am     Pt is  39w4d intra-uterine pregnancy. History obtained, consents signed.  Oriented to room, staff, and

## 2020-03-03 LAB
BASOPHILS # BLD AUTO: 0.04 X10(3) UL (ref 0–0.2)
BASOPHILS NFR BLD AUTO: 0.4 %
DEPRECATED RDW RBC AUTO: 61.1 FL (ref 35.1–46.3)
EOSINOPHIL # BLD AUTO: 0.03 X10(3) UL (ref 0–0.7)
EOSINOPHIL NFR BLD AUTO: 0.3 %
ERYTHROCYTE [DISTWIDTH] IN BLOOD BY AUTOMATED COUNT: 17.6 % (ref 11–15)
HCT VFR BLD AUTO: 34 % (ref 35–48)
HGB BLD-MCNC: 11.2 G/DL (ref 12–16)
IMM GRANULOCYTES # BLD AUTO: 0.04 X10(3) UL (ref 0–1)
IMM GRANULOCYTES NFR BLD: 0.4 %
LYMPHOCYTES # BLD AUTO: 1.81 X10(3) UL (ref 1–4)
LYMPHOCYTES NFR BLD AUTO: 18.6 %
MCH RBC QN AUTO: 31 PG (ref 26–34)
MCHC RBC AUTO-ENTMCNC: 32.9 G/DL (ref 31–37)
MCV RBC AUTO: 94.2 FL (ref 80–100)
MONOCYTES # BLD AUTO: 1.02 X10(3) UL (ref 0.1–1)
MONOCYTES NFR BLD AUTO: 10.5 %
NEUTROPHILS # BLD AUTO: 6.78 X10 (3) UL (ref 1.5–7.7)
NEUTROPHILS # BLD AUTO: 6.78 X10(3) UL (ref 1.5–7.7)
NEUTROPHILS NFR BLD AUTO: 69.8 %
PLATELET # BLD AUTO: 160 10(3)UL (ref 150–450)
RBC # BLD AUTO: 3.61 X10(6)UL (ref 3.8–5.3)
WBC # BLD AUTO: 9.7 X10(3) UL (ref 4–11)

## 2020-03-03 NOTE — PLAN OF CARE
Problem: Patient Centered Care  Goal: Patient preferences are identified and integrated in the patient's plan of care  Description  Interventions:  - What would you like us to know as we care for you?  Gender of infant is a surprise  - Provide timely, com history. Outcome: Progressing  Goal: Optimize infant feeding at the breast  Description  INTERVENTIONS:  - Initiate breast feeding within first hour after birth. - Monitor effectiveness of current breast feeding efforts.   - Assess support systems availa and manage engorgement. - Instruct on breast care. - Provide comfort measures. Outcome: Progressing  Goal: Appropriate maternal -  bonding  Description  INTERVENTIONS:  - Assess caregiver- interactions.   - Assess caregiver's emotional stat

## 2020-03-03 NOTE — LACTATION NOTE
This note was copied from a baby's chart.   LACTATION NOTE - INFANT    Evaluation Type  Evaluation Type: Inpatient    Problems & Assessment  Muscle tone: Appropriate for GA    Feeding Assessment  Summary Current Feeding: Adlib;Breastfeeding exclusively  Oth

## 2020-03-03 NOTE — PROGRESS NOTES
Post-Partum Note   3/3/2020, 12:55 PM    Subjective:  Patient doing well. Pain mild. Lochia is small. She reports she does tolerate a regular diet. She denies headache, fever, chest pain, shortness of breath, and leg pain.   She has ambulated and denies

## 2020-03-03 NOTE — LACTATION NOTE
Patient seen this AM for complaint of latch difficulty on the right breast. Advised to call for assistance with the next feeding. She declined to call.

## 2020-03-04 VITALS
TEMPERATURE: 98 F | HEIGHT: 67 IN | WEIGHT: 232 LBS | RESPIRATION RATE: 16 BRPM | BODY MASS INDEX: 36.41 KG/M2 | HEART RATE: 90 BPM | SYSTOLIC BLOOD PRESSURE: 121 MMHG | DIASTOLIC BLOOD PRESSURE: 63 MMHG

## 2020-03-04 RX ORDER — IBUPROFEN 600 MG/1
600 TABLET ORAL EVERY 6 HOURS PRN
Qty: 60 TABLET | Refills: 0 | Status: SHIPPED | OUTPATIENT
Start: 2020-03-04 | End: 2020-06-16

## 2020-03-04 RX ORDER — PSEUDOEPHEDRINE HCL 30 MG
100 TABLET ORAL 2 TIMES DAILY
Qty: 60 CAPSULE | Refills: 0 | Status: SHIPPED | OUTPATIENT
Start: 2020-03-04 | End: 2020-06-16

## 2020-03-04 NOTE — LACTATION NOTE
LACTATION NOTE - MOTHER      Evaluation Type: Inpatient    Problems identified  Problems identified: Knowledge deficit    Maternal history  Maternal history: Anemia  Other/comment: history of gastroesophageal reflux, HPV    Breastfeeding goal  Breastfeedin

## 2020-03-04 NOTE — PROGRESS NOTES
Post-Partum Note   3/4/2020, 7:17 AM    Subjective:  Patient doing well. Pain is well controlled. She is ambulating without lightheadedness or dizziness. Denies fevers or chills. Denies SOB, CP. She feels ready to go home.  No questions or concerns this am.

## 2020-03-04 NOTE — PLAN OF CARE
Problem: Patient Centered Care  Goal: Patient preferences are identified and integrated in the patient's plan of care  Description  Interventions:  - What would you like us to know as we care for you?  Gender of infant is a surprise  - Provide timely, com history. Outcome: Completed  Goal: Optimize infant feeding at the breast  Description  INTERVENTIONS:  - Initiate breast feeding within first hour after birth. - Monitor effectiveness of current breast feeding efforts.   - Assess support systems availabl manage engorgement. - Instruct on breast care. - Provide comfort measures. Outcome: Completed  Goal: Appropriate maternal -  bonding  Description  INTERVENTIONS:  - Assess caregiver- interactions.   - Assess caregiver's emotional status and

## 2020-03-06 ENCOUNTER — TELEPHONE (OUTPATIENT)
Dept: OBGYN UNIT | Facility: HOSPITAL | Age: 35
End: 2020-03-06

## 2020-04-17 ENCOUNTER — VIRTUAL PHONE E/M (OUTPATIENT)
Dept: OBGYN CLINIC | Facility: CLINIC | Age: 35
End: 2020-04-17

## 2020-04-17 NOTE — PROGRESS NOTES
Virtual Telephone Check-In    Noemy aWllace verbally consents to a Virtual/Telephone Check-In visit on 04/17/20. Patient understands and accepts financial responsibility for any deductible, co-insurance and/or co-pays associated with this service. visit:    Encounter for postpartum visit        Discussed birthcontrol: Patient has chosen condom. Patient to return for annual gyne exam in June.

## 2020-06-04 ENCOUNTER — HOSPITAL ENCOUNTER (OUTPATIENT)
Dept: ULTRASOUND IMAGING | Age: 35
Discharge: HOME | End: 2020-06-04
Attending: INTERNAL MEDICINE
Payer: COMMERCIAL

## 2020-06-04 DIAGNOSIS — E04.1 THYROID NODULE: ICD-10-CM

## 2020-06-04 PROCEDURE — 76536 US EXAM OF HEAD AND NECK: CPT | Performed by: INTERNAL MEDICINE

## 2020-06-05 ENCOUNTER — TELEPHONE (OUTPATIENT)
Dept: INTERNAL MEDICINE CLINIC | Facility: CLINIC | Age: 35
End: 2020-06-05

## 2020-06-05 DIAGNOSIS — Z00.00 PHYSICAL EXAM: Primary | ICD-10-CM

## 2020-06-05 DIAGNOSIS — E04.2 MULTIPLE THYROID NODULES: ICD-10-CM

## 2020-06-16 ENCOUNTER — OFFICE VISIT (OUTPATIENT)
Dept: INTERNAL MEDICINE CLINIC | Facility: CLINIC | Age: 35
End: 2020-06-16

## 2020-06-16 VITALS
HEART RATE: 61 BPM | DIASTOLIC BLOOD PRESSURE: 79 MMHG | SYSTOLIC BLOOD PRESSURE: 115 MMHG | HEIGHT: 67 IN | WEIGHT: 213 LBS | BODY MASS INDEX: 33.43 KG/M2

## 2020-06-16 DIAGNOSIS — G43.109 MIGRAINE WITH AURA AND WITHOUT STATUS MIGRAINOSUS, NOT INTRACTABLE: ICD-10-CM

## 2020-06-16 DIAGNOSIS — Z00.00 PE (PHYSICAL EXAM), ROUTINE: Primary | ICD-10-CM

## 2020-06-16 DIAGNOSIS — D64.9 ANEMIA, UNSPECIFIED TYPE: ICD-10-CM

## 2020-06-16 PROCEDURE — 99395 PREV VISIT EST AGE 18-39: CPT | Performed by: INTERNAL MEDICINE

## 2020-06-16 PROCEDURE — 99213 OFFICE O/P EST LOW 20 MIN: CPT | Performed by: INTERNAL MEDICINE

## 2020-06-16 NOTE — PROGRESS NOTES
HPI:    Patient ID: Bhavana Bolden is a 29year old female.   Patient presents with:  Physical    Patient presents today for physical exam, states doing well otherwise, states occasionally gets migraine headaches with some aura eyes gets lines and sometime and headaches. Psychiatric/Behavioral: Negative for confusion. The patient is not nervous/anxious. Current Outpatient Medications   Medication Sig Dispense Refill   • Prenatal Vit-Fe Sulfate-FA (PRENATAL VITAMIN OR) Take by mouth.      • Zoya currently breastfeeding.           \

## 2020-06-22 ENCOUNTER — TELEPHONE (OUTPATIENT)
Dept: OBGYN CLINIC | Facility: CLINIC | Age: 35
End: 2020-06-22

## 2020-06-22 DIAGNOSIS — M54.9 BACK PAIN, UNSPECIFIED BACK LOCATION, UNSPECIFIED BACK PAIN LATERALITY, UNSPECIFIED CHRONICITY: Primary | ICD-10-CM

## 2020-06-22 NOTE — TELEPHONE ENCOUNTER
Pt states she is experiencing back pain and \"pain in her ovaries\"for the past 4-5 days. Pt took an at home uti test and it was positive. Pt informed she would still need to come in for a US at our lab. Order placed.   Pt advised we will contact her wit

## 2020-06-22 NOTE — TELEPHONE ENCOUNTER
Patient has back pain, stomach pain and had a positive UTI at home test.  Please call at:[phone],thanks.

## 2020-06-23 ENCOUNTER — LAB ENCOUNTER (OUTPATIENT)
Dept: LAB | Age: 35
End: 2020-06-23
Attending: OBSTETRICS & GYNECOLOGY
Payer: COMMERCIAL

## 2020-06-23 DIAGNOSIS — M54.9 BACK PAIN, UNSPECIFIED BACK LOCATION, UNSPECIFIED BACK PAIN LATERALITY, UNSPECIFIED CHRONICITY: ICD-10-CM

## 2020-06-23 PROCEDURE — 81001 URINALYSIS AUTO W/SCOPE: CPT

## 2020-06-24 RX ORDER — SULFAMETHOXAZOLE AND TRIMETHOPRIM 800; 160 MG/1; MG/1
1 TABLET ORAL 2 TIMES DAILY
Qty: 14 TABLET | Refills: 0 | Status: SHIPPED | OUTPATIENT
Start: 2020-06-24 | End: 2020-07-01

## 2020-06-24 NOTE — TELEPHONE ENCOUNTER
Pt states she is still having back pain and pain that she describes as \"ovary cramping\". Pt also states she does feel a \"sting\" when urinating. Pt advised message will be given to MD for recs. Message to MAHESH on call. States he will call pt.

## 2020-06-24 NOTE — TELEPHONE ENCOUNTER
LMTCB.  UA is available. Need to confirm with pt if she still having back pain, ovary pain. Does she have a fever, urgency or frequency. Is pt breastfeeding. Once pt replies, needs to be sent to MD on Call.

## 2020-06-24 NOTE — TELEPHONE ENCOUNTER
I spoke with Lesli Fowler and discussed symptoms. No flank pain but rather low back, pelvic pain and dysuria. She states this is very similar to previously diagnosed UTI's. UA shows clean catch with few bacteria and mild leuk esterase. I sent Rx for Bactrim.

## 2020-06-29 ENCOUNTER — LAB ENCOUNTER (OUTPATIENT)
Dept: LAB | Age: 35
End: 2020-06-29
Attending: INTERNAL MEDICINE
Payer: COMMERCIAL

## 2020-06-29 ENCOUNTER — TELEPHONE (OUTPATIENT)
Dept: OBGYN CLINIC | Facility: CLINIC | Age: 35
End: 2020-06-29

## 2020-06-29 DIAGNOSIS — Z00.00 PHYSICAL EXAM: ICD-10-CM

## 2020-06-29 DIAGNOSIS — E04.2 MULTIPLE THYROID NODULES: ICD-10-CM

## 2020-06-29 LAB
ALBUMIN SERPL-MCNC: 3.5 G/DL (ref 3.4–5)
ALBUMIN/GLOB SERPL: 1 {RATIO} (ref 1–2)
ALP LIVER SERPL-CCNC: 78 U/L (ref 37–98)
ALT SERPL-CCNC: 21 U/L (ref 13–56)
ANION GAP SERPL CALC-SCNC: 7 MMOL/L (ref 0–18)
AST SERPL-CCNC: 10 U/L (ref 15–37)
BASOPHILS # BLD AUTO: 0.03 X10(3) UL (ref 0–0.2)
BASOPHILS NFR BLD AUTO: 0.6 %
BILIRUB SERPL-MCNC: 0.6 MG/DL (ref 0.1–2)
BUN BLD-MCNC: 20 MG/DL (ref 7–18)
BUN/CREAT SERPL: 27.4 (ref 10–20)
CALCIUM BLD-MCNC: 8.6 MG/DL (ref 8.5–10.1)
CHLORIDE SERPL-SCNC: 108 MMOL/L (ref 98–112)
CHOLEST SMN-MCNC: 182 MG/DL (ref ?–200)
CO2 SERPL-SCNC: 25 MMOL/L (ref 21–32)
CREAT BLD-MCNC: 0.73 MG/DL (ref 0.55–1.02)
DEPRECATED RDW RBC AUTO: 46.4 FL (ref 35.1–46.3)
EOSINOPHIL # BLD AUTO: 0.11 X10(3) UL (ref 0–0.7)
EOSINOPHIL NFR BLD AUTO: 2 %
ERYTHROCYTE [DISTWIDTH] IN BLOOD BY AUTOMATED COUNT: 13.5 % (ref 11–15)
GLOBULIN PLAS-MCNC: 3.6 G/DL (ref 2.8–4.4)
GLUCOSE BLD-MCNC: 88 MG/DL (ref 70–99)
HCT VFR BLD AUTO: 38.7 % (ref 35–48)
HDLC SERPL-MCNC: 60 MG/DL (ref 40–59)
HGB BLD-MCNC: 12.8 G/DL (ref 12–16)
IMM GRANULOCYTES # BLD AUTO: 0.01 X10(3) UL (ref 0–1)
IMM GRANULOCYTES NFR BLD: 0.2 %
LDLC SERPL CALC-MCNC: 107 MG/DL (ref ?–100)
LYMPHOCYTES # BLD AUTO: 2.39 X10(3) UL (ref 1–4)
LYMPHOCYTES NFR BLD AUTO: 44.2 %
M PROTEIN MFR SERPL ELPH: 7.1 G/DL (ref 6.4–8.2)
MCH RBC QN AUTO: 30.8 PG (ref 26–34)
MCHC RBC AUTO-ENTMCNC: 33.1 G/DL (ref 31–37)
MCV RBC AUTO: 93.3 FL (ref 80–100)
MONOCYTES # BLD AUTO: 0.5 X10(3) UL (ref 0.1–1)
MONOCYTES NFR BLD AUTO: 9.2 %
NEUTROPHILS # BLD AUTO: 2.37 X10 (3) UL (ref 1.5–7.7)
NEUTROPHILS # BLD AUTO: 2.37 X10(3) UL (ref 1.5–7.7)
NEUTROPHILS NFR BLD AUTO: 43.8 %
NONHDLC SERPL-MCNC: 122 MG/DL (ref ?–130)
OSMOLALITY SERPL CALC.SUM OF ELEC: 292 MOSM/KG (ref 275–295)
PATIENT FASTING Y/N/NP: YES
PATIENT FASTING Y/N/NP: YES
PLATELET # BLD AUTO: 240 10(3)UL (ref 150–450)
POTASSIUM SERPL-SCNC: 4.3 MMOL/L (ref 3.5–5.1)
RBC # BLD AUTO: 4.15 X10(6)UL (ref 3.8–5.3)
SODIUM SERPL-SCNC: 140 MMOL/L (ref 136–145)
TRIGL SERPL-MCNC: 76 MG/DL (ref 30–149)
TSI SER-ACNC: 1.43 MIU/ML (ref 0.36–3.74)
VLDLC SERPL CALC-MCNC: 15 MG/DL (ref 0–30)
WBC # BLD AUTO: 5.4 X10(3) UL (ref 4–11)

## 2020-06-29 PROCEDURE — 85025 COMPLETE CBC W/AUTO DIFF WBC: CPT

## 2020-06-29 PROCEDURE — 80053 COMPREHEN METABOLIC PANEL: CPT

## 2020-06-29 PROCEDURE — 36415 COLL VENOUS BLD VENIPUNCTURE: CPT

## 2020-06-29 PROCEDURE — 84443 ASSAY THYROID STIM HORMONE: CPT

## 2020-06-29 PROCEDURE — 80061 LIPID PANEL: CPT

## 2020-06-29 NOTE — TELEPHONE ENCOUNTER
Pt reports abdominal pain, lower back pain, and ovarian pain x2 wks. Pt was prescribed Bactrim on 6/24/2020 for possible UTI. Pt reports on 6/26/2020 pt had HA, dizziness, and tingling in hands and feet. Pt last dose of Bactrim was 6/26/2020 in AM. Pt reports HA, dizziness, and tingling in hands and feet lasted until Saturday PM. Pt states yesterday pt felt \"good\". Pt states today pt went for labs and \"felt a little dizzy and now I feel okay\". Pt denies current HA, dizziness, or tingling in hands or feet. Advised pt to push hydration at least 64 oz of water daily and to not take anymore Bactrim. Informed pt message will be sent to MAHESH. Pt verbalized understanding.

## 2020-06-30 NOTE — TELEPHONE ENCOUNTER
I spoke with Kenyatta Mistry and discussed her complaints. It appears that she is having two separate issues. The neuro complaint is that of HA and vertigo rather than near syncopal feeling. The low back and pelvic pain would not be from UTI. I treated her with the Bactrim based on clinical presentation then. The UA was not markedly abnormal. She is breast feeding and her pelvic complaints could signal an ovulatory cycle. We obviously wouldn't know this until a period comes again. LMP was 05-25. She is not ill in any other system except some looser stools without blood, melena. I asked her to follow symptoms and if worsening or least no better by next Monday, we may order imaging. If vertigo persists or worsens, I will want her to see PCP.

## 2020-06-30 NOTE — TELEPHONE ENCOUNTER
Pt calling to report today her lower back and hips \"feel achy\". Pt states she is no longer having HA, dizziness, or tingling in hands and feet. Pt denies dysuria. Informed pt message will be sent to MAHESH again and we will call once he replies. Pt verbalized understanding.

## 2020-07-02 ENCOUNTER — OFFICE VISIT (OUTPATIENT)
Dept: OBGYN CLINIC | Facility: CLINIC | Age: 35
End: 2020-07-02

## 2020-07-02 VITALS
SYSTOLIC BLOOD PRESSURE: 105 MMHG | WEIGHT: 204 LBS | DIASTOLIC BLOOD PRESSURE: 71 MMHG | BODY MASS INDEX: 32 KG/M2 | HEART RATE: 63 BPM

## 2020-07-02 DIAGNOSIS — Z01.419 WELL WOMAN EXAM: Primary | ICD-10-CM

## 2020-07-02 PROCEDURE — 99395 PREV VISIT EST AGE 18-39: CPT | Performed by: OBSTETRICS & GYNECOLOGY

## 2020-07-02 NOTE — H&P
HPI:  The patient is a 28 yo F here for WWE. Breastfeeding and no menses.  2020. Had UTI symptoms last week and treated with Bactrim. Had some cramping in abdomen. Stopped after completion. Pt with HA x 1 month.   Get some sensation or rhys week: Not on file        Minutes per session: Not on file      Stress: Not on file    Relationships      Social connections:        Talks on phone: Not on file        Gets together: Not on file        Attends [employer] service: Not on file        Active me breath  Gastrointestinal:  denies heartburn, abdominal pain, diarrhea or constipation  Genitourinary:  denies dysuria, incontinence, abnormal vaginal discharge, vaginal itching  Musculoskeletal:  denies back pain.   Skin/Breast:  Denies any breast pain, lum yr

## 2020-07-03 LAB — HPV I/H RISK 1 DNA SPEC QL NAA+PROBE: NEGATIVE

## 2020-07-29 ENCOUNTER — OFFICE VISIT (OUTPATIENT)
Dept: INTERNAL MEDICINE CLINIC | Facility: CLINIC | Age: 35
End: 2020-07-29

## 2020-07-29 ENCOUNTER — LABORATORY ENCOUNTER (OUTPATIENT)
Dept: LAB | Age: 35
End: 2020-07-29
Attending: INTERNAL MEDICINE
Payer: COMMERCIAL

## 2020-07-29 VITALS
SYSTOLIC BLOOD PRESSURE: 108 MMHG | DIASTOLIC BLOOD PRESSURE: 75 MMHG | HEIGHT: 67 IN | WEIGHT: 206 LBS | HEART RATE: 69 BPM | BODY MASS INDEX: 32.33 KG/M2

## 2020-07-29 DIAGNOSIS — R20.2 TINGLING OF SKIN: ICD-10-CM

## 2020-07-29 DIAGNOSIS — G43.809 OTHER MIGRAINE WITHOUT STATUS MIGRAINOSUS, NOT INTRACTABLE: Primary | ICD-10-CM

## 2020-07-29 DIAGNOSIS — E55.9 VITAMIN D DEFICIENCY: ICD-10-CM

## 2020-07-29 DIAGNOSIS — D64.9 ANEMIA, UNSPECIFIED TYPE: ICD-10-CM

## 2020-07-29 DIAGNOSIS — G43.809 OTHER MIGRAINE WITHOUT STATUS MIGRAINOSUS, NOT INTRACTABLE: ICD-10-CM

## 2020-07-29 PROBLEM — G43.909 MIGRAINE: Status: ACTIVE | Noted: 2020-07-29

## 2020-07-29 LAB
DEPRECATED HBV CORE AB SER IA-ACNC: 44.3 NG/ML (ref 12–160)
VIT B12 SERPL-MCNC: 444 PG/ML (ref 193–986)

## 2020-07-29 PROCEDURE — 3074F SYST BP LT 130 MM HG: CPT | Performed by: INTERNAL MEDICINE

## 2020-07-29 PROCEDURE — 3078F DIAST BP <80 MM HG: CPT | Performed by: INTERNAL MEDICINE

## 2020-07-29 PROCEDURE — 36415 COLL VENOUS BLD VENIPUNCTURE: CPT

## 2020-07-29 PROCEDURE — 99214 OFFICE O/P EST MOD 30 MIN: CPT | Performed by: INTERNAL MEDICINE

## 2020-07-29 PROCEDURE — 82306 VITAMIN D 25 HYDROXY: CPT

## 2020-07-29 PROCEDURE — 3008F BODY MASS INDEX DOCD: CPT | Performed by: INTERNAL MEDICINE

## 2020-07-29 PROCEDURE — 82607 VITAMIN B-12: CPT

## 2020-07-29 PROCEDURE — 82728 ASSAY OF FERRITIN: CPT

## 2020-07-29 NOTE — PROGRESS NOTES
HPI:    Patient ID: Parag Doan is a 58 [address] Stre[place] old female. Patient presents with:   Follow - Up: patient states no longer having headaches, now continues with tingling feeling and pressure throughout head x 1 week      F/u migraine headache -patient stat Gastrointestinal: Negative for abdominal pain, constipation, diarrhea, nausea and vomiting. Genitourinary: Negative for dysuria and frequency. Skin: Negative for pallor. Neurological: Negative for dizziness, seizures and headaches.         Tingling No edema. Cervical back: She exhibits normal range of motion, no bony tenderness, no deformity and no spasm. Lymphadenopathy:     She has no cervical adenopathy. Neurological: She is alert and oriented to person, place, and time.  She has normal st comlete    Refer to neuro  - NEURO - INTERNAL  - FERRITIN; Future  - VITAMIN B12 WITH REFLEX TO MMA; Future  - VITAMIN D, 25-HYDROXY; Future    3.  Anemia, unspecified type  Labs to compete   Prenatal vit   - FERRITIN; Future  - VITAMIN B12 WITH REFLEX TO M

## 2020-07-31 LAB — 25(OH)D3 SERPL-MCNC: 29.2 NG/ML (ref 30–100)

## 2020-08-12 ENCOUNTER — TELEPHONE (OUTPATIENT)
Dept: INTERNAL MEDICINE CLINIC | Facility: CLINIC | Age: 35
End: 2020-08-12

## 2020-08-12 DIAGNOSIS — Z12.83 SKIN EXAM, SCREENING FOR CANCER: Primary | ICD-10-CM

## 2020-08-12 NOTE — TELEPHONE ENCOUNTER
Dr. Spann Fees, patient is requesting a referral to Dr. Mando Bryant. Referral has been pended, please advise.

## 2020-08-12 NOTE — TELEPHONE ENCOUNTER
Patient is requesting a referral for Dr. Levar Diego (Dermatologist). Patient states she has an appointment scheduled Monday 08/17/2020 with Dr. Kera Guadarrama for a full body check up.     # [phone]

## 2020-08-14 NOTE — TELEPHONE ENCOUNTER
From  Keyona Roberson To  Garret Blair and Delivered  8/13/2020  8:47 AM   Last Read in MyChart  8/13/2020  8:55 AM by Roxanna Gonzalez

## 2020-08-17 ENCOUNTER — OFFICE VISIT (OUTPATIENT)
Dept: DERMATOLOGY CLINIC | Facility: CLINIC | Age: 35
End: 2020-08-17

## 2020-08-17 DIAGNOSIS — D22.9 MULTIPLE BENIGN NEVI: ICD-10-CM

## 2020-08-17 DIAGNOSIS — Z12.83 SKIN CANCER SCREENING: Primary | ICD-10-CM

## 2020-08-17 DIAGNOSIS — D18.01 HEMANGIOMA OF SKIN AND SUBCUTANEOUS TISSUE: ICD-10-CM

## 2020-08-17 PROCEDURE — 99213 OFFICE O/P EST LOW 20 MIN: CPT | Performed by: DERMATOLOGY

## 2020-08-17 NOTE — PROGRESS NOTES
HPI:     Chief Complaint     Full Skin Exam        HPI     Full Skin Exam      Additional comments: LOV 8/15/2019 - Pt presenting for full body skin exam.  Pt denies any personal or family hx of skin cancer.           Last edited by Gavin Hayden RN on education level: Not on file    Occupational History      Not on file    Social Needs      Financial resource strain: Not on file      Food insecurity:        Worry: Not on file        Inability: Not on file      Transportation needs:        Medical: Not o pacemaker: No        Pt has a defibrillator: No        Breast feeding: Not Asked        Reaction to local anesthetic: No    Social History Narrative      Not on file    Family History   Problem Relation Age of Onset   • Breast Cancer Paternal Aunt        P

## 2020-08-25 ENCOUNTER — OFFICE VISIT (OUTPATIENT)
Dept: NEUROLOGY | Facility: CLINIC | Age: 35
End: 2020-08-25

## 2020-08-25 DIAGNOSIS — R51.9 NEW ONSET HEADACHE: ICD-10-CM

## 2020-08-25 DIAGNOSIS — R20.2 PARESTHESIA: Primary | ICD-10-CM

## 2020-08-25 PROCEDURE — 99204 OFFICE O/P NEW MOD 45 MIN: CPT | Performed by: OTHER

## 2020-08-25 RX ORDER — IBUPROFEN 200 MG
400 TABLET ORAL EVERY 6 HOURS PRN
COMMUNITY
End: 2020-11-09

## 2020-08-25 NOTE — PROGRESS NOTES
Neurology Initial Visit     Referred By: Dr. Marge Rucker    Chief Complaint: Patient presents with:  Headache: New patient referred by Dr Marge Rucker. C/o headache about 1 month  ago  that has resolved.  Now c/o of intermittent tingling and pressure to s Rfl:     No Known Allergies    ROS:   As in HPI, the rest of the 14 system review was done and was negative      Physical Exam:  There were no vitals filed for this visit. General: No apparent distress, well nourished, well groomed.   Head- Normocephalic 12.8 06/29/2020    HCT 38.7 06/29/2020    MCV 93.3 06/29/2020    WBC 5.4 06/29/2020    .0 06/29/2020      Lab Results   Component Value Date    BUN 20 (H) 06/29/2020    CA 8.6 06/29/2020    ALT 21 06/29/2020    AST 10 (L) 06/29/2020    ALKPHOS 54 08

## 2020-08-31 ENCOUNTER — PATIENT MESSAGE (OUTPATIENT)
Dept: OBGYN CLINIC | Facility: CLINIC | Age: 35
End: 2020-08-31

## 2020-08-31 NOTE — TELEPHONE ENCOUNTER
From: Sherwin Winston  To: Bob Sage DO  Sent: 8/31/2020 12:55 PM CDT  Subject: Non-Urgent Medical Question    Good Afternoon,    I am not exactly sure where I should direct this question.  I am currently breast feeding my 11 month old and have an MR

## 2020-09-05 ENCOUNTER — HOSPITAL ENCOUNTER (OUTPATIENT)
Dept: MRI IMAGING | Age: 35
Discharge: HOME | End: 2020-09-05
Attending: Other
Payer: COMMERCIAL

## 2020-09-05 DIAGNOSIS — R20.2 PARESTHESIA: ICD-10-CM

## 2020-09-05 DIAGNOSIS — R51.9 NEW ONSET HEADACHE: ICD-10-CM

## 2020-09-05 PROCEDURE — A9575 INJ GADOTERATE MEGLUMI 0.1ML: HCPCS | Performed by: OTHER

## 2020-09-05 PROCEDURE — 70553 MRI BRAIN STEM W/O & W/DYE: CPT | Performed by: OTHER

## 2020-09-05 PROCEDURE — 72156 MRI NECK SPINE W/O & W/DYE: CPT | Performed by: OTHER

## 2020-09-08 ENCOUNTER — TELEPHONE (OUTPATIENT)
Dept: NEUROLOGY | Facility: CLINIC | Age: 35
End: 2020-09-08

## 2020-09-08 DIAGNOSIS — R20.2 PARESTHESIA: Primary | ICD-10-CM

## 2020-09-08 DIAGNOSIS — M54.2 NECK PAIN: ICD-10-CM

## 2020-09-08 NOTE — TELEPHONE ENCOUNTER
Spoke to patient and notified her of below. She was understanding and would like to proceed with PT. Phone number given. Order placed.

## 2020-09-08 NOTE — TELEPHONE ENCOUNTER
----- Message from Bhavin More MD sent at 9/8/2020  7:55 AM CDT -----  Please let patient know that MRI brain didn't show significant abnormalities. While MRI of the cervical spine showed some minimal mild degenerative disc changes.   As we discus

## 2020-11-04 ENCOUNTER — NURSE TRIAGE (OUTPATIENT)
Dept: INTERNAL MEDICINE CLINIC | Facility: CLINIC | Age: 35
End: 2020-11-04

## 2020-11-04 NOTE — TELEPHONE ENCOUNTER
RN please call pt.      ----- Message from Clementine Milian sent at 11/4/2020  9:54 AM CST -----  Regarding: Non-Urgent Medical Question  Contact: [phone]  Good Morning,    I have two separate questions regarding some symptoms I have been experiencing

## 2020-11-04 NOTE — TELEPHONE ENCOUNTER
Action Requested: Summary for Provider     []  Critical Lab, Recommendations Needed  [] Need Additional Advice  []   FYI    []   Need Orders  [] Need Medications Sent to Pharmacy  [x]  Other     SUMMARY: Per protocol advised: OV.  Offered appointment tomorr

## 2020-11-09 ENCOUNTER — PATIENT MESSAGE (OUTPATIENT)
Dept: OBGYN CLINIC | Facility: CLINIC | Age: 35
End: 2020-11-09

## 2020-11-09 ENCOUNTER — OFFICE VISIT (OUTPATIENT)
Dept: INTERNAL MEDICINE CLINIC | Facility: CLINIC | Age: 35
End: 2020-11-09
Payer: COMMERCIAL

## 2020-11-09 VITALS
BODY MASS INDEX: 30.92 KG/M2 | DIASTOLIC BLOOD PRESSURE: 73 MMHG | SYSTOLIC BLOOD PRESSURE: 118 MMHG | WEIGHT: 197 LBS | HEART RATE: 75 BPM | HEIGHT: 67 IN

## 2020-11-09 DIAGNOSIS — M25.511 CHRONIC RIGHT SHOULDER PAIN: ICD-10-CM

## 2020-11-09 DIAGNOSIS — G89.29 CHRONIC RIGHT SHOULDER PAIN: ICD-10-CM

## 2020-11-09 DIAGNOSIS — K21.9 GASTROESOPHAGEAL REFLUX DISEASE WITHOUT ESOPHAGITIS: Primary | ICD-10-CM

## 2020-11-09 PROCEDURE — 3074F SYST BP LT 130 MM HG: CPT | Performed by: INTERNAL MEDICINE

## 2020-11-09 PROCEDURE — 3008F BODY MASS INDEX DOCD: CPT | Performed by: INTERNAL MEDICINE

## 2020-11-09 PROCEDURE — 99214 OFFICE O/P EST MOD 30 MIN: CPT | Performed by: INTERNAL MEDICINE

## 2020-11-09 PROCEDURE — 3078F DIAST BP <80 MM HG: CPT | Performed by: INTERNAL MEDICINE

## 2020-11-09 PROCEDURE — 90686 IIV4 VACC NO PRSV 0.5 ML IM: CPT | Performed by: INTERNAL MEDICINE

## 2020-11-09 PROCEDURE — 90471 IMMUNIZATION ADMIN: CPT | Performed by: INTERNAL MEDICINE

## 2020-11-09 NOTE — PROGRESS NOTES
HPI:    Patient ID: Dwayne Lundy is a 28year old female. Patient presents with:  Heartburn  Shoulder Pain: C/o right shoulder pain for a couple of months. Recals no recent injuries.   Pain occurs when she wakes up in the morning and goes away as the d disturbance. Respiratory: Negative for shortness of breath and wheezing. Cardiovascular: Negative for chest pain, palpitations and leg swelling. Gastrointestinal: Positive for heartburn (worsening  in 2  weeks ).  Negative for abdominal distention, a rhythm and normal heart sounds. No murmur heard. Pulmonary/Chest: Effort normal and breath sounds normal. No respiratory distress. She has no wheezes. She has no rales. Abdominal: Soft. She exhibits no distension and no mass.  There is no hepatosplenom daily as needed but she will call the GYN for further instructions on other medication on past Pepcid or possible PPIs - with breast-feeding discussed with patient none of those medications are completely safe but they are also not contraindicated with angel

## 2020-11-09 NOTE — TELEPHONE ENCOUNTER
From: Merlin Gray  To: Gail Collier. DO Chu  Sent: 11/9/2020 1:50 PM CST  Subject: Non-Urgent Medical Question    Good Afternoon,    I had a visit with Dr. Ana Harris today and she advised me to message you.  I am currently nursing and need an OTC hear

## 2020-11-13 ENCOUNTER — PATIENT MESSAGE (OUTPATIENT)
Dept: INTERNAL MEDICINE CLINIC | Facility: CLINIC | Age: 35
End: 2020-11-13

## 2020-11-13 ENCOUNTER — TELEPHONE (OUTPATIENT)
Dept: INTERNAL MEDICINE CLINIC | Facility: CLINIC | Age: 35
End: 2020-11-13

## 2020-11-13 DIAGNOSIS — R10.10 PAIN OF UPPER ABDOMEN: Primary | ICD-10-CM

## 2020-11-13 NOTE — TELEPHONE ENCOUNTER
From: Christen Veliz  To: Bettye Caceres MD  Sent: 11/13/2020 9:13 AM CST  Subject: Visit Follow-up Question    Allison,    I saw Dr. aArti Fatima on Monday regarding an issue with some heartburn and shoulder pain.  Since the visit, I am now experiencing

## 2020-11-13 NOTE — TELEPHONE ENCOUNTER
Response sent to patient. Advised to call office to speak with RN regarding new symptoms. Sooner appt may be warranted.

## 2020-11-19 ENCOUNTER — OFFICE VISIT (OUTPATIENT)
Dept: INTERNAL MEDICINE CLINIC | Facility: CLINIC | Age: 35
End: 2020-11-19
Payer: COMMERCIAL

## 2020-11-19 ENCOUNTER — LAB ENCOUNTER (OUTPATIENT)
Dept: LAB | Age: 35
End: 2020-11-19
Attending: INTERNAL MEDICINE
Payer: COMMERCIAL

## 2020-11-19 VITALS
BODY MASS INDEX: 30.29 KG/M2 | SYSTOLIC BLOOD PRESSURE: 128 MMHG | WEIGHT: 193 LBS | HEIGHT: 67 IN | DIASTOLIC BLOOD PRESSURE: 81 MMHG | HEART RATE: 75 BPM

## 2020-11-19 DIAGNOSIS — N64.4 BREAST PAIN: ICD-10-CM

## 2020-11-19 DIAGNOSIS — R10.11 RIGHT UPPER QUADRANT ABDOMINAL PAIN: Primary | ICD-10-CM

## 2020-11-19 DIAGNOSIS — R10.11 RIGHT UPPER QUADRANT ABDOMINAL PAIN: ICD-10-CM

## 2020-11-19 PROCEDURE — 85025 COMPLETE CBC W/AUTO DIFF WBC: CPT

## 2020-11-19 PROCEDURE — 80053 COMPREHEN METABOLIC PANEL: CPT

## 2020-11-19 PROCEDURE — 3008F BODY MASS INDEX DOCD: CPT | Performed by: INTERNAL MEDICINE

## 2020-11-19 PROCEDURE — 36415 COLL VENOUS BLD VENIPUNCTURE: CPT

## 2020-11-19 PROCEDURE — 83690 ASSAY OF LIPASE: CPT

## 2020-11-19 PROCEDURE — 3079F DIAST BP 80-89 MM HG: CPT | Performed by: INTERNAL MEDICINE

## 2020-11-19 PROCEDURE — 99214 OFFICE O/P EST MOD 30 MIN: CPT | Performed by: INTERNAL MEDICINE

## 2020-11-19 PROCEDURE — 3074F SYST BP LT 130 MM HG: CPT | Performed by: INTERNAL MEDICINE

## 2020-11-19 NOTE — PROGRESS NOTES
HPI:    Patient ID: Tanya Dye is a 28year old female. Patient presents with:  Breast Pain: C/o bilateral breast pain. Stts pain is located on top and bottom of both breasts.       Patient had states last couple days has some bilateral breast pain p shoulder ache improving  with avoiding lifting). Skin: Negative for pallor. Neurological: Negative for dizziness and headaches. Psychiatric/Behavioral: Negative for confusion. The patient is not nervous/anxious.              Current Outpatient Medicat Musculoskeletal:         General: No edema. Lymphadenopathy:     She has no cervical adenopathy. Right axillary: No pectoral and no lateral adenopathy present. Left axillary: No pectoral and no lateral adenopathy present.        Right: No

## 2020-12-01 ENCOUNTER — HOSPITAL ENCOUNTER (OUTPATIENT)
Dept: ULTRASOUND IMAGING | Age: 35
Discharge: HOME | End: 2020-12-01
Attending: INTERNAL MEDICINE
Payer: COMMERCIAL

## 2020-12-01 DIAGNOSIS — R10.10 PAIN OF UPPER ABDOMEN: ICD-10-CM

## 2020-12-01 PROCEDURE — 76700 US EXAM ABDOM COMPLETE: CPT | Performed by: INTERNAL MEDICINE

## 2020-12-04 ENCOUNTER — TELEPHONE (OUTPATIENT)
Dept: INTERNAL MEDICINE CLINIC | Facility: CLINIC | Age: 35
End: 2020-12-04

## 2020-12-04 DIAGNOSIS — K82.4 GALLBLADDER POLYP: Primary | ICD-10-CM

## 2021-01-05 ENCOUNTER — OFFICE VISIT (OUTPATIENT)
Dept: OBGYN CLINIC | Facility: CLINIC | Age: 36
End: 2021-01-05
Payer: COMMERCIAL

## 2021-01-05 VITALS
HEART RATE: 73 BPM | BODY MASS INDEX: 31 KG/M2 | SYSTOLIC BLOOD PRESSURE: 118 MMHG | DIASTOLIC BLOOD PRESSURE: 82 MMHG | WEIGHT: 197 LBS

## 2021-01-05 DIAGNOSIS — R10.2 PELVIC PAIN: Primary | ICD-10-CM

## 2021-01-05 PROCEDURE — 3074F SYST BP LT 130 MM HG: CPT | Performed by: OBSTETRICS & GYNECOLOGY

## 2021-01-05 PROCEDURE — 99213 OFFICE O/P EST LOW 20 MIN: CPT | Performed by: OBSTETRICS & GYNECOLOGY

## 2021-01-05 PROCEDURE — 3079F DIAST BP 80-89 MM HG: CPT | Performed by: OBSTETRICS & GYNECOLOGY

## 2021-01-06 NOTE — PROGRESS NOTES
Carleen Felton is a 28year old female [id number] Patient's last menstrual period was 12/20/2020. Patient presents with:  Gyn Problem: pelvic and breast pain    Last seen in 2019 for pregnancy. [facility]. Breastfeeding. retraction or skin changes       Pelvic Exam:  External Genitalia: normal appearance, hair distribution, and no lesions  Urethral Meatus:  normal in size, location, without lesions and prolapse  Bladder:  No fullness, masses or tenderness  Vagina:  Normal

## 2021-01-18 ENCOUNTER — LAB ENCOUNTER (OUTPATIENT)
Dept: LAB | Age: 36
End: 2021-01-18
Attending: INTERNAL MEDICINE
Payer: COMMERCIAL

## 2021-01-18 ENCOUNTER — TELEMEDICINE (OUTPATIENT)
Dept: INTERNAL MEDICINE CLINIC | Facility: CLINIC | Age: 36
End: 2021-01-18
Payer: COMMERCIAL

## 2021-01-18 DIAGNOSIS — B34.9 VIRAL SYNDROME: ICD-10-CM

## 2021-01-18 DIAGNOSIS — B34.9 VIRAL SYNDROME: Primary | ICD-10-CM

## 2021-01-18 PROCEDURE — 99213 OFFICE O/P EST LOW 20 MIN: CPT | Performed by: INTERNAL MEDICINE

## 2021-01-18 NOTE — PROGRESS NOTES
Telemedicine Note    Virtual Video Check-In    Mary Precise verbally consents to a Virtual Video Check-In service on 01/18/21.  Patient understands and accepts financial responsibility for any deductible, co-insurance and/or co-pays associated with this large gatherings:      Past Medical History:   Diagnosis Date   • Decorative tattoo    • HPV in female    • Thyroid cyst    • Varicella     Had chicken pox during childhood     Past Surgical History:   Procedure Laterality Date   • Colposcopy, cervix w/upp extremities normal     Summary of topics discussed/ diagnosis:  Assessment and plan     1. Viral syndrome  - SARS-COV-2 BY PCR ();  Future    Coronavirus COV 19 education  Diarrhea and fatigue  patient to stay home -on home isolation  For diarrhea ruben

## 2021-01-19 LAB — SARS-COV-2 RNA RESP QL NAA+PROBE: NOT DETECTED

## 2021-01-29 ENCOUNTER — HOSPITAL ENCOUNTER (OUTPATIENT)
Dept: ULTRASOUND IMAGING | Age: 36
Discharge: HOME | End: 2021-01-29
Attending: OBSTETRICS & GYNECOLOGY
Payer: COMMERCIAL

## 2021-01-29 DIAGNOSIS — R10.2 PELVIC PAIN: ICD-10-CM

## 2021-01-29 PROCEDURE — 76830 TRANSVAGINAL US NON-OB: CPT | Performed by: OBSTETRICS & GYNECOLOGY

## 2021-01-29 PROCEDURE — 76856 US EXAM PELVIC COMPLETE: CPT | Performed by: OBSTETRICS & GYNECOLOGY

## 2021-03-01 ENCOUNTER — LAB ENCOUNTER (OUTPATIENT)
Dept: LAB | Age: 36
End: 2021-03-01
Attending: INTERNAL MEDICINE
Payer: COMMERCIAL

## 2021-03-01 ENCOUNTER — OFFICE VISIT (OUTPATIENT)
Dept: INTERNAL MEDICINE CLINIC | Facility: CLINIC | Age: 36
End: 2021-03-01
Payer: COMMERCIAL

## 2021-03-01 VITALS
HEIGHT: 67 IN | DIASTOLIC BLOOD PRESSURE: 84 MMHG | WEIGHT: 200 LBS | SYSTOLIC BLOOD PRESSURE: 124 MMHG | BODY MASS INDEX: 31.39 KG/M2 | HEART RATE: 80 BPM

## 2021-03-01 DIAGNOSIS — D64.9 ANEMIA, UNSPECIFIED TYPE: ICD-10-CM

## 2021-03-01 DIAGNOSIS — R20.2 TINGLING OF SKIN: ICD-10-CM

## 2021-03-01 DIAGNOSIS — R20.2 TINGLING OF SKIN: Primary | ICD-10-CM

## 2021-03-01 DIAGNOSIS — E55.9 VITAMIN D DEFICIENCY: ICD-10-CM

## 2021-03-01 LAB
ALBUMIN SERPL-MCNC: 3.6 G/DL (ref 3.4–5)
ALBUMIN/GLOB SERPL: 1 {RATIO} (ref 1–2)
ALP LIVER SERPL-CCNC: 68 U/L
ALT SERPL-CCNC: 24 U/L
ANION GAP SERPL CALC-SCNC: 3 MMOL/L (ref 0–18)
AST SERPL-CCNC: 8 U/L (ref 15–37)
BASOPHILS # BLD AUTO: 0.05 X10(3) UL (ref 0–0.2)
BASOPHILS NFR BLD AUTO: 0.6 %
BILIRUB SERPL-MCNC: 0.5 MG/DL (ref 0.1–2)
BUN BLD-MCNC: 11 MG/DL (ref 7–18)
BUN/CREAT SERPL: 16.2 (ref 10–20)
CALCIUM BLD-MCNC: 8.6 MG/DL (ref 8.5–10.1)
CHLORIDE SERPL-SCNC: 108 MMOL/L (ref 98–112)
CO2 SERPL-SCNC: 28 MMOL/L (ref 21–32)
CREAT BLD-MCNC: 0.68 MG/DL
DEPRECATED RDW RBC AUTO: 42.8 FL (ref 35.1–46.3)
EOSINOPHIL # BLD AUTO: 0.03 X10(3) UL (ref 0–0.7)
EOSINOPHIL NFR BLD AUTO: 0.4 %
ERYTHROCYTE [DISTWIDTH] IN BLOOD BY AUTOMATED COUNT: 12.9 % (ref 11–15)
FOLATE SERPL-MCNC: 11.5 NG/ML (ref 8.7–?)
GLOBULIN PLAS-MCNC: 3.6 G/DL (ref 2.8–4.4)
GLUCOSE BLD-MCNC: 90 MG/DL (ref 70–99)
HCT VFR BLD AUTO: 40.1 %
HGB BLD-MCNC: 13.4 G/DL
IMM GRANULOCYTES # BLD AUTO: 0.01 X10(3) UL (ref 0–1)
IMM GRANULOCYTES NFR BLD: 0.1 %
LYMPHOCYTES # BLD AUTO: 2.57 X10(3) UL (ref 1–4)
LYMPHOCYTES NFR BLD AUTO: 33 %
M PROTEIN MFR SERPL ELPH: 7.2 G/DL (ref 6.4–8.2)
MCH RBC QN AUTO: 30.2 PG (ref 26–34)
MCHC RBC AUTO-ENTMCNC: 33.4 G/DL (ref 31–37)
MCV RBC AUTO: 90.5 FL
MONOCYTES # BLD AUTO: 0.59 X10(3) UL (ref 0.1–1)
MONOCYTES NFR BLD AUTO: 7.6 %
NEUTROPHILS # BLD AUTO: 4.53 X10 (3) UL (ref 1.5–7.7)
NEUTROPHILS # BLD AUTO: 4.53 X10(3) UL (ref 1.5–7.7)
NEUTROPHILS NFR BLD AUTO: 58.3 %
OSMOLALITY SERPL CALC.SUM OF ELEC: 287 MOSM/KG (ref 275–295)
PATIENT FASTING Y/N/NP: NO
PLATELET # BLD AUTO: 298 10(3)UL (ref 150–450)
POTASSIUM SERPL-SCNC: 4.4 MMOL/L (ref 3.5–5.1)
RBC # BLD AUTO: 4.43 X10(6)UL
SODIUM SERPL-SCNC: 139 MMOL/L (ref 136–145)
TSI SER-ACNC: 1.04 MIU/ML (ref 0.36–3.74)
VIT B12 SERPL-MCNC: 403 PG/ML (ref 193–986)
WBC # BLD AUTO: 7.8 X10(3) UL (ref 4–11)

## 2021-03-01 PROCEDURE — 80053 COMPREHEN METABOLIC PANEL: CPT

## 2021-03-01 PROCEDURE — 3008F BODY MASS INDEX DOCD: CPT | Performed by: INTERNAL MEDICINE

## 2021-03-01 PROCEDURE — 99214 OFFICE O/P EST MOD 30 MIN: CPT | Performed by: INTERNAL MEDICINE

## 2021-03-01 PROCEDURE — 82607 VITAMIN B-12: CPT

## 2021-03-01 PROCEDURE — 3079F DIAST BP 80-89 MM HG: CPT | Performed by: INTERNAL MEDICINE

## 2021-03-01 PROCEDURE — 36415 COLL VENOUS BLD VENIPUNCTURE: CPT

## 2021-03-01 PROCEDURE — 85025 COMPLETE CBC W/AUTO DIFF WBC: CPT

## 2021-03-01 PROCEDURE — 84443 ASSAY THYROID STIM HORMONE: CPT

## 2021-03-01 PROCEDURE — 82746 ASSAY OF FOLIC ACID SERUM: CPT

## 2021-03-01 PROCEDURE — 3074F SYST BP LT 130 MM HG: CPT | Performed by: INTERNAL MEDICINE

## 2021-03-01 NOTE — PROGRESS NOTES
HPI:    Patient ID: Deandre Galvan is a 28year old female. Patient presents with: Follow - Up: Follow up on/off tingling/poking sensation in her chest, breast, neck, head, and shoulders.       Patient is a breast-feeding mother for 1 year presents with Skin: Negative for pallor. Neurological: Positive for tingling. Negative for dizziness, facial asymmetry, light-headedness and headaches.         Tingling  -sensation chest and  Breast      Mother early 76 - [place]  Dementia    Psychiatric/Behavioral: She exhibits normal muscle tone. Gait normal.   Skin: Skin is warm and dry. No erythema. Psychiatric: She has a normal mood and affect. Nursing note and vitals reviewed.       Blood pressure 124/84, pulse 80, height 5' 7\" (1.702 m), weight 200 lb (90.7

## 2021-03-09 ENCOUNTER — OFFICE VISIT (OUTPATIENT)
Dept: NEUROLOGY | Facility: CLINIC | Age: 36
End: 2021-03-09

## 2021-03-09 ENCOUNTER — LAB ENCOUNTER (OUTPATIENT)
Dept: LAB | Facility: HOSPITAL | Age: 36
End: 2021-03-09
Attending: Other
Payer: COMMERCIAL

## 2021-03-09 VITALS — HEIGHT: 67 IN | BODY MASS INDEX: 31.39 KG/M2 | WEIGHT: 200 LBS

## 2021-03-09 DIAGNOSIS — R20.2 PARESTHESIA: Primary | ICD-10-CM

## 2021-03-09 DIAGNOSIS — R20.2 PARESTHESIA: ICD-10-CM

## 2021-03-09 DIAGNOSIS — R25.3 FASCICULATIONS: ICD-10-CM

## 2021-03-09 LAB
CK SERPL-CCNC: 69 U/L
LDH SERPL L TO P-CCNC: 138 U/L

## 2021-03-09 PROCEDURE — 99214 OFFICE O/P EST MOD 30 MIN: CPT | Performed by: OTHER

## 2021-03-09 PROCEDURE — 3008F BODY MASS INDEX DOCD: CPT | Performed by: OTHER

## 2021-03-09 PROCEDURE — 83615 LACTATE (LD) (LDH) ENZYME: CPT

## 2021-03-09 PROCEDURE — 84446 ASSAY OF VITAMIN E: CPT | Performed by: OTHER

## 2021-03-09 PROCEDURE — 36415 COLL VENOUS BLD VENIPUNCTURE: CPT | Performed by: OTHER

## 2021-03-09 PROCEDURE — 82550 ASSAY OF CK (CPK): CPT

## 2021-03-09 PROCEDURE — 82085 ASSAY OF ALDOLASE: CPT

## 2021-03-09 PROCEDURE — 86618 LYME DISEASE ANTIBODY: CPT

## 2021-03-09 NOTE — PROGRESS NOTES
Neurology Follow up Visit     Referred By: Dr. Dunlap ref. provider found    Chief Complaint: Patient presents with:  Numbness: LOV: 8/25/20. F/U on tingling. Patient states that she has been tingling in upper body that comes and goes.  It started in summer o 0.50 Packs/day For 15.00 Years   Quit date: 3/17/2017   Smokeless tobacco: Never Used       Alcohol use Yes   Comment: SOCIALLY       Drug use: No       Family History   Problem Relation Age of Onset   • Breast Cancer Paternal Aunt          Current Outpati symmetric    No clonus  No Babinski sign    Coordination:  Finger to nose intact  Rapid alternating movements intact    Gait:  Normal posture  Normal physiologic      Labs:    Lab Results   Component Value Date    TSH 1.040 03/01/2021     Lab Results   Com

## 2021-03-10 LAB
ALDOLASE, SERUM: 2.7 U/L
B BURGDOR IGG+IGM SER QL: NEGATIVE

## 2021-03-11 ENCOUNTER — TELEPHONE (OUTPATIENT)
Dept: NEUROLOGY | Facility: CLINIC | Age: 36
End: 2021-03-11

## 2021-03-11 NOTE — TELEPHONE ENCOUNTER
----- Message from Vita Boss MD sent at 3/11/2021 10:04 AM CST -----  Please let the patient know that results of these particular lab tests so far were normal.    Thank you

## 2021-03-12 LAB
ALPHA-TOCOPHEROL (VIT E) -MG/L: 8.7 MG/L
GAMMA-TOCOPHEROL (VIT E) -MG/L: 1.4 MG/L

## 2021-03-18 NOTE — TELEPHONE ENCOUNTER
Advised patient of Dr. Adam Choudhury note and number given to Behavioral health navigator if does not hear from them in the next few days. Patient verbalized understanding.

## 2021-03-18 NOTE — TELEPHONE ENCOUNTER
From: Sarita Green  To: Rimma Flanagan MD  Sent: 3/18/2021  9:27 AM CDT  Subject: Referral Request    Good morning,    I would like to know how to go about getting a referral for mental health services. Please advise at your earliest convenience.

## 2021-03-18 NOTE — TELEPHONE ENCOUNTER
Action Requested: Summary for Provider     []  Critical Lab, Recommendations Needed  [x] Need Additional Advice  []   FYI    []   Need Orders  [] Need Medications Sent to Pharmacy  []  Other     SUMMARY:   Spoke with pt,  verified, pt been seeing Dr Raya Roper

## 2021-04-13 ENCOUNTER — NURSE TRIAGE (OUTPATIENT)
Dept: INTERNAL MEDICINE CLINIC | Facility: CLINIC | Age: 36
End: 2021-04-13

## 2021-04-13 NOTE — TELEPHONE ENCOUNTER
Action Requested: Summary for Provider     []  Critical Lab, Recommendations Needed  [] Need Additional Advice  []   FYI    []   Need Orders  [] Need Medications Sent to Pharmacy  []  Other     SUMMARY: Requesting to be seen by different provider tomorro

## 2021-04-14 ENCOUNTER — TELEPHONE (OUTPATIENT)
Dept: PHYSICAL THERAPY | Age: 36
End: 2021-04-14

## 2021-04-14 ENCOUNTER — LAB ENCOUNTER (OUTPATIENT)
Dept: LAB | Age: 36
End: 2021-04-14
Attending: FAMILY MEDICINE
Payer: COMMERCIAL

## 2021-04-14 ENCOUNTER — OFFICE VISIT (OUTPATIENT)
Dept: FAMILY MEDICINE CLINIC | Facility: CLINIC | Age: 36
End: 2021-04-14
Payer: COMMERCIAL

## 2021-04-14 VITALS
HEIGHT: 67 IN | BODY MASS INDEX: 31.39 KG/M2 | DIASTOLIC BLOOD PRESSURE: 69 MMHG | SYSTOLIC BLOOD PRESSURE: 104 MMHG | WEIGHT: 200 LBS | HEART RATE: 76 BPM

## 2021-04-14 DIAGNOSIS — R42 VERTIGO: Primary | ICD-10-CM

## 2021-04-14 DIAGNOSIS — R20.2 TINGLING OF SKIN: ICD-10-CM

## 2021-04-14 PROCEDURE — 84425 ASSAY OF VITAMIN B-1: CPT

## 2021-04-14 PROCEDURE — 84446 ASSAY OF VITAMIN E: CPT

## 2021-04-14 PROCEDURE — 99214 OFFICE O/P EST MOD 30 MIN: CPT | Performed by: FAMILY MEDICINE

## 2021-04-14 PROCEDURE — 84591 ASSAY OF NOS VITAMIN: CPT

## 2021-04-14 PROCEDURE — 3008F BODY MASS INDEX DOCD: CPT | Performed by: FAMILY MEDICINE

## 2021-04-14 PROCEDURE — 3078F DIAST BP <80 MM HG: CPT | Performed by: FAMILY MEDICINE

## 2021-04-14 PROCEDURE — 36415 COLL VENOUS BLD VENIPUNCTURE: CPT

## 2021-04-14 PROCEDURE — 82525 ASSAY OF COPPER: CPT

## 2021-04-14 PROCEDURE — 3074F SYST BP LT 130 MM HG: CPT | Performed by: FAMILY MEDICINE

## 2021-04-14 RX ORDER — MECLIZINE HYDROCHLORIDE 25 MG/1
25 TABLET ORAL 3 TIMES DAILY PRN
Qty: 20 TABLET | Refills: 0 | Status: SHIPPED | OUTPATIENT
Start: 2021-04-14 | End: 2021-05-04 | Stop reason: ALTCHOICE

## 2021-04-14 NOTE — PROGRESS NOTES
Carmel Adan is a 28year old female.   HPI:   Here for 1 day history of reported dizziness, patient reports that she woke up and she felt everything was moving, symptoms started after she woke up but seem to be triggered by head movement, she was able t Head: Normocephalic and atraumatic. Cardiovascular:      Rate and Rhythm: Normal rate and regular rhythm. Pulses: Normal pulses.       Heart sounds: Normal heart sounds, S1 normal and S2 normal.   Pulmonary:      Effort: Pulmonary effort is norm

## 2021-04-20 ENCOUNTER — OFFICE VISIT (OUTPATIENT)
Dept: PHYSICAL THERAPY | Facility: HOSPITAL | Age: 36
End: 2021-04-20
Attending: FAMILY MEDICINE
Payer: COMMERCIAL

## 2021-04-20 DIAGNOSIS — R42 VERTIGO: ICD-10-CM

## 2021-04-20 PROCEDURE — 97161 PT EVAL LOW COMPLEX 20 MIN: CPT

## 2021-04-20 PROCEDURE — 95992 CANALITH REPOSITIONING PROC: CPT

## 2021-04-20 NOTE — PROGRESS NOTES
PHYSICAL THERAPY EVALUATION:   Referring Physician: Dr. Mcgovern All  Diagnosis: BPPV      Date of Onset: per HPI Date of Service: 4/20/2021        PATIENT SUMMARY   Tamica Lara is a 28year old y/o female who presents to therapy today with report posterior canal BPPV. CRM performed x2 with good tolerance. Pt verbalized understanding of material taught today.   Pt. would benefit from skilled Physical Therapy to address the above impairments to resolve BPPV in order to facilitate full return to PLOF a instruction, Patient/family education, Canalith Repositioning Maneuver, Eye/head coordination exercises, Sensory organization training, Optokinetic stimulation, Neuromuscular Re-education    Education or treatment limitation: None  Rehab Potential: good

## 2021-04-28 ENCOUNTER — OFFICE VISIT (OUTPATIENT)
Dept: PHYSICAL THERAPY | Facility: HOSPITAL | Age: 36
End: 2021-04-28
Attending: FAMILY MEDICINE
Payer: COMMERCIAL

## 2021-04-28 DIAGNOSIS — R42 VERTIGO: ICD-10-CM

## 2021-04-28 PROCEDURE — 97112 NEUROMUSCULAR REEDUCATION: CPT

## 2021-04-28 NOTE — PROGRESS NOTES
Discharge Summary    Referring Physician: Mehran Enciso    Diagnosis: BPPV    Pt has attended 2 visits in Physical Therapy. Subjective: Pt reports she has been able to sleep and change positions without symptoms of rotary vertigo.  Still feels \"off\" NCS    Electronically signed by therapist: Hardeep Chauhan PT

## 2021-04-28 NOTE — PATIENT INSTRUCTIONS
When walking try to move your head all directions. Just naturally scan your environment. Do these exercises 2 times a day. 1. Stand and hold a target at arms length in front of your eyes. Keep eyes focused on target.   Turn eyes, head and target to

## 2021-04-30 ENCOUNTER — TELEPHONE (OUTPATIENT)
Dept: FAMILY MEDICINE CLINIC | Facility: CLINIC | Age: 36
End: 2021-04-30

## 2021-04-30 NOTE — TELEPHONE ENCOUNTER
Called to and left voicemail to try and triage pt due to mychart stating \"Tingling and pins/needles all over body, chest sensations\"

## 2021-04-30 NOTE — TELEPHONE ENCOUNTER
SUMMARY: booked [facility] appt for Tuesday 5/4/2021    Reports chronic \"tingling\" for the past year to head, neck and body. Has been getting \"pins and needles\" in her feet and back for the past week. Has seen doctor for this before. Wants to keep her appointment next week. Advised to call back if worse.

## 2021-05-04 ENCOUNTER — OFFICE VISIT (OUTPATIENT)
Dept: FAMILY MEDICINE CLINIC | Facility: CLINIC | Age: 36
End: 2021-05-04
Payer: COMMERCIAL

## 2021-05-04 ENCOUNTER — APPOINTMENT (OUTPATIENT)
Dept: PHYSICAL THERAPY | Facility: HOSPITAL | Age: 36
End: 2021-05-04
Attending: FAMILY MEDICINE
Payer: COMMERCIAL

## 2021-05-04 VITALS
WEIGHT: 202.38 LBS | HEART RATE: 80 BPM | BODY MASS INDEX: 31.76 KG/M2 | SYSTOLIC BLOOD PRESSURE: 112 MMHG | HEIGHT: 67 IN | RESPIRATION RATE: 18 BRPM | DIASTOLIC BLOOD PRESSURE: 76 MMHG

## 2021-05-04 DIAGNOSIS — R20.2 TINGLING OF SKIN: ICD-10-CM

## 2021-05-04 DIAGNOSIS — M94.0 COSTOCHONDRITIS: Primary | ICD-10-CM

## 2021-05-04 PROCEDURE — 99213 OFFICE O/P EST LOW 20 MIN: CPT | Performed by: FAMILY MEDICINE

## 2021-05-04 PROCEDURE — 3078F DIAST BP <80 MM HG: CPT | Performed by: FAMILY MEDICINE

## 2021-05-04 PROCEDURE — 3074F SYST BP LT 130 MM HG: CPT | Performed by: FAMILY MEDICINE

## 2021-05-04 PROCEDURE — 3008F BODY MASS INDEX DOCD: CPT | Performed by: FAMILY MEDICINE

## 2021-05-04 NOTE — PROGRESS NOTES
Jun Jackson is a 28year old female.   HPI:   Patient here to follow-up in last visit, she is states that symptoms are getting worse with tingling progressing now only from hand and back but to hands and lower extremities, she also reports that she has no guarding. Skin:     General: Skin is warm and dry. Neurological:      General: No focal deficit present. Mental Status: She is alert and oriented to person, place, and time. Mental status is at baseline.    Psychiatric:         Mood and Affect:

## 2021-05-11 ENCOUNTER — PATIENT MESSAGE (OUTPATIENT)
Dept: FAMILY MEDICINE CLINIC | Facility: CLINIC | Age: 36
End: 2021-05-11

## 2021-05-12 ENCOUNTER — TELEPHONE (OUTPATIENT)
Dept: FAMILY MEDICINE CLINIC | Facility: CLINIC | Age: 36
End: 2021-05-12

## 2021-05-12 ENCOUNTER — ORDER TRANSCRIPTION (OUTPATIENT)
Dept: PHYSICAL THERAPY | Facility: HOSPITAL | Age: 36
End: 2021-05-12

## 2021-05-12 ENCOUNTER — APPOINTMENT (OUTPATIENT)
Dept: PHYSICAL THERAPY | Facility: HOSPITAL | Age: 36
End: 2021-05-12
Attending: FAMILY MEDICINE
Payer: COMMERCIAL

## 2021-05-12 DIAGNOSIS — R42 VERTIGO: Primary | ICD-10-CM

## 2021-05-12 NOTE — TELEPHONE ENCOUNTER
From: Brittney Tinajero  To: Merlyn Barrientos. Bandar Neal MD  Sent: 5/11/2021 5:14 PM CDT  Subject: Visit Follow-up Question    Hello,     I was recently diagnosed with positional vertigo.  I completed two sessions of physical therapy and was discharged, but I be

## 2021-05-12 NOTE — TELEPHONE ENCOUNTER
Patient states physical therapy does require her to bring a referral. Patient goes to [place] or [place] [address] locations. Patient states she needs physical therapy for her vertigo. Please advise.

## 2021-05-12 NOTE — TELEPHONE ENCOUNTER
Please contact directly physical therapy, she already has a referral in the system, I need to verify that she does need a new authorization as she never completed the authorized number of sessions

## 2021-05-13 NOTE — TELEPHONE ENCOUNTER
Per Ronda Cover from physical therapy states that patient was discharged and in order to get reevaluated patient needs a new order.

## 2021-05-13 NOTE — TELEPHONE ENCOUNTER
Spoke to patient, name and  verified. Relayed message below, pt states having physical therapy number.

## 2021-05-19 ENCOUNTER — APPOINTMENT (OUTPATIENT)
Dept: PHYSICAL THERAPY | Facility: HOSPITAL | Age: 36
End: 2021-05-19
Attending: FAMILY MEDICINE
Payer: COMMERCIAL

## 2021-05-19 DIAGNOSIS — R42 VERTIGO: ICD-10-CM

## 2021-05-19 PROCEDURE — 97162 PT EVAL MOD COMPLEX 30 MIN: CPT

## 2021-05-19 PROCEDURE — 97112 NEUROMUSCULAR REEDUCATION: CPT

## 2021-05-19 NOTE — PROGRESS NOTES
PHYSICAL THERAPY EVALUATION:   Referring Physician: Dr. Rubio Fan  Diagnosis: dizziness      Date of Onset: per HPI Date of Service: 5/19/2021        PATIENT SUMMARY   Anabela John is a 28year old y/o female who presents to therapy today with r Date   • Decorative tattoo    • HPV in female    • Thyroid cyst    • Varicella     Had chicken pox during childhood             ASSESSMENT:      Kim Plasencia presents today with recent recurrence of symptoms of dizziness. Denies rotary vertigo.  Feels generally \"o [facility]): negative   Head hanging -negative but mildly symptomatic upon sitting up from testing position; tested x2 with similar result.      Postural Control:   Romberg: EO 30 sec, EC 30 sec   Tandem Stance: R back EO 30 sec, EC 25 sec; L back EO 30 sec, EC 17 PT    [de-identified] certification required: Yes  I certify the need for these services furnished under this plan of treatment and while under my care.     X___________________________________________________ Date____________________    Certification From: 5/1

## 2021-05-21 ENCOUNTER — PATIENT MESSAGE (OUTPATIENT)
Dept: FAMILY MEDICINE CLINIC | Facility: CLINIC | Age: 36
End: 2021-05-21

## 2021-05-21 DIAGNOSIS — R20.2 TINGLING OF SKIN: Primary | ICD-10-CM

## 2021-05-21 NOTE — TELEPHONE ENCOUNTER
From: Charity Anderson  To: Judah Bloch. Ana Dominguez MD  Sent: 5/21/2021 8:40 AM CDT  Subject: Referral Request    Hello,    I was recently given a referral for a [place] [facility] [place] have had for a while.  I called to sched

## 2021-05-25 ENCOUNTER — OFFICE VISIT (OUTPATIENT)
Dept: PHYSICAL THERAPY | Facility: HOSPITAL | Age: 36
End: 2021-05-25
Attending: FAMILY MEDICINE
Payer: COMMERCIAL

## 2021-05-25 PROCEDURE — 97112 NEUROMUSCULAR REEDUCATION: CPT

## 2021-05-25 NOTE — PROGRESS NOTES
Diagnosis: dizziness   Visit (# authorized):  8 per POC (10 visits approved [place])                        Referring Physician: Juan العلي    Precautions: none     Medication changes since last visit?:  No    Subjective: Has been feeling better overa

## 2021-05-26 ENCOUNTER — APPOINTMENT (OUTPATIENT)
Dept: PHYSICAL THERAPY | Facility: HOSPITAL | Age: 36
End: 2021-05-26
Attending: FAMILY MEDICINE
Payer: COMMERCIAL

## 2021-06-02 ENCOUNTER — APPOINTMENT (OUTPATIENT)
Dept: PHYSICAL THERAPY | Facility: HOSPITAL | Age: 36
End: 2021-06-02
Attending: FAMILY MEDICINE
Payer: COMMERCIAL

## 2021-06-07 ENCOUNTER — OFFICE VISIT (OUTPATIENT)
Dept: PHYSICAL THERAPY | Facility: HOSPITAL | Age: 36
End: 2021-06-07
Attending: FAMILY MEDICINE
Payer: COMMERCIAL

## 2021-06-07 PROCEDURE — 97112 NEUROMUSCULAR REEDUCATION: CPT

## 2021-06-07 NOTE — PROGRESS NOTES
Diagnosis: dizziness   Visit (# authorized):  8 per POC (10 visits approved [place])                        Referring Physician: Ethan Connolly    Precautions: none     Medication changes since last visit?:  No    Subjective: Felt good since last appointm

## 2021-06-09 ENCOUNTER — APPOINTMENT (OUTPATIENT)
Dept: PHYSICAL THERAPY | Facility: HOSPITAL | Age: 36
End: 2021-06-09
Attending: FAMILY MEDICINE
Payer: COMMERCIAL

## 2021-06-14 ENCOUNTER — OFFICE VISIT (OUTPATIENT)
Dept: PHYSICAL THERAPY | Facility: HOSPITAL | Age: 36
End: 2021-06-14
Attending: FAMILY MEDICINE
Payer: COMMERCIAL

## 2021-06-14 PROCEDURE — 97112 NEUROMUSCULAR REEDUCATION: CPT

## 2021-06-14 NOTE — PATIENT INSTRUCTIONS
When walking try to move your head all directions. Just naturally scan your environment. Yoga with Alexandra - on youtube free, a couple times a week    Videos also on youtube (See other paper). Do these exercises 2 times a day.      1. Stand and hold

## 2021-06-14 NOTE — PROGRESS NOTES
Diagnosis: dizziness   Visit (# authorized):  8 per POC (10 visits approved [place])                        Referring Physician: Glenroy Pulido    Precautions: none     Medication changes since last visit?:  No    Subjective: Brief symptoms of imbalance a

## 2021-06-30 ENCOUNTER — APPOINTMENT (OUTPATIENT)
Dept: PHYSICAL THERAPY | Facility: HOSPITAL | Age: 36
End: 2021-06-30
Attending: FAMILY MEDICINE
Payer: COMMERCIAL

## 2021-07-07 ENCOUNTER — APPOINTMENT (OUTPATIENT)
Dept: PHYSICAL THERAPY | Facility: HOSPITAL | Age: 36
End: 2021-07-07
Attending: FAMILY MEDICINE
Payer: COMMERCIAL

## 2021-07-14 ENCOUNTER — APPOINTMENT (OUTPATIENT)
Dept: PHYSICAL THERAPY | Facility: HOSPITAL | Age: 36
End: 2021-07-14
Attending: FAMILY MEDICINE
Payer: COMMERCIAL

## 2021-07-21 ENCOUNTER — APPOINTMENT (OUTPATIENT)
Dept: PHYSICAL THERAPY | Facility: HOSPITAL | Age: 36
End: 2021-07-21
Attending: FAMILY MEDICINE
Payer: COMMERCIAL

## 2021-08-06 ENCOUNTER — OFFICE VISIT (OUTPATIENT)
Dept: FAMILY MEDICINE CLINIC | Facility: CLINIC | Age: 36
End: 2021-08-06
Payer: COMMERCIAL

## 2021-08-06 VITALS
SYSTOLIC BLOOD PRESSURE: 120 MMHG | BODY MASS INDEX: 31.86 KG/M2 | WEIGHT: 203 LBS | DIASTOLIC BLOOD PRESSURE: 73 MMHG | RESPIRATION RATE: 16 BRPM | HEIGHT: 67 IN | HEART RATE: 84 BPM

## 2021-08-06 DIAGNOSIS — Z13.21 ENCOUNTER FOR VITAMIN DEFICIENCY SCREENING: ICD-10-CM

## 2021-08-06 DIAGNOSIS — Z00.00 WELLNESS EXAMINATION: Primary | ICD-10-CM

## 2021-08-06 DIAGNOSIS — Z13.0 SCREENING FOR DEFICIENCY ANEMIA: ICD-10-CM

## 2021-08-06 DIAGNOSIS — H69.83 EUSTACHIAN TUBE DYSFUNCTION, BILATERAL: ICD-10-CM

## 2021-08-06 DIAGNOSIS — Z13.220 LIPID SCREENING: ICD-10-CM

## 2021-08-06 DIAGNOSIS — R59.0 AXILLARY ADENOPATHY: ICD-10-CM

## 2021-08-06 PROCEDURE — 3008F BODY MASS INDEX DOCD: CPT | Performed by: FAMILY MEDICINE

## 2021-08-06 PROCEDURE — 3078F DIAST BP <80 MM HG: CPT | Performed by: FAMILY MEDICINE

## 2021-08-06 PROCEDURE — 99395 PREV VISIT EST AGE 18-39: CPT | Performed by: FAMILY MEDICINE

## 2021-08-06 PROCEDURE — 3074F SYST BP LT 130 MM HG: CPT | Performed by: FAMILY MEDICINE

## 2021-08-06 RX ORDER — METHYLPREDNISOLONE 4 MG/1
TABLET ORAL
Qty: 1 EACH | Refills: 0 | Status: SHIPPED | OUTPATIENT
Start: 2021-08-06 | End: 2021-08-27

## 2021-08-06 NOTE — PROGRESS NOTES
HPI:   Noemy Wallace is a 28year old female who presents for an Annual Health Visit. Patient overall is doing well, she does report that after Covid vaccine she has been noted axillary inflammation and mild discomfort but no specific mass.     Sh 7\" (1.702 m)   Wt 203 lb (92.1 kg)   LMP 04/10/2021 (Exact Date)   BMI 31.79 kg/m²    Wt Readings from Last 6 Encounters:  08/06/21 : 203 lb (92.1 kg)  05/04/21 : 202 lb 6.4 oz (91.8 kg)  04/14/21 : 200 lb (90.7 kg)  03/09/21 : 200 lb (90.7 kg)  03/01/21 Lipid screening  -     LIPID PANEL; Future    Screening for deficiency anemia  -     CBC WITH DIFFERENTIAL WITH PLATELET;  Future    Axillary adenopathy    Eustachian tube dysfunction, bilateral  -     methylPREDNISolone (MEDROL) 4 MG Oral Tablet Therapy [place] years; women between ages 27 and 72 are advised to have a Pap test plus an HPV test every 5 years    Chlamydia Sexually active women ages 25 and younger, and women at increased risk for infection  Every 3 years if you're at risk or have symptoms    Depress the second dose should be given 1 to 2 months after the first dose and the third dose given 6 months after the first dose    Influenza (flu) All women in this age group  Once a year   Measles, mumps, rubella (MMR)  All women in this age group who have no r U.S. Preventive Services Task Force (USPSTF) does not recommend CBE. 2 Those who are 25years old and not up-to-date on their childhood vaccines should get all appropriate catch-up vaccines recommended by the CDC.    3 The USPSTF recommends that all peopl

## 2021-08-07 ENCOUNTER — LAB ENCOUNTER (OUTPATIENT)
Dept: LAB | Age: 36
End: 2021-08-07
Attending: FAMILY MEDICINE
Payer: COMMERCIAL

## 2021-08-07 DIAGNOSIS — Z13.21 ENCOUNTER FOR VITAMIN DEFICIENCY SCREENING: ICD-10-CM

## 2021-08-07 DIAGNOSIS — Z00.00 WELLNESS EXAMINATION: ICD-10-CM

## 2021-08-07 DIAGNOSIS — Z13.220 LIPID SCREENING: ICD-10-CM

## 2021-08-07 DIAGNOSIS — Z13.0 SCREENING FOR DEFICIENCY ANEMIA: ICD-10-CM

## 2021-08-07 LAB
ALBUMIN SERPL-MCNC: 3.7 G/DL (ref 3.4–5)
ALBUMIN/GLOB SERPL: 1 {RATIO} (ref 1–2)
ALP LIVER SERPL-CCNC: 52 U/L
ALT SERPL-CCNC: 21 U/L
ANION GAP SERPL CALC-SCNC: 6 MMOL/L (ref 0–18)
AST SERPL-CCNC: 9 U/L (ref 15–37)
BASOPHILS # BLD AUTO: 0.03 X10(3) UL (ref 0–0.2)
BASOPHILS NFR BLD AUTO: 0.5 %
BILIRUB SERPL-MCNC: 0.8 MG/DL (ref 0.1–2)
BUN BLD-MCNC: 16 MG/DL (ref 7–18)
BUN/CREAT SERPL: 22.9 (ref 10–20)
CALCIUM BLD-MCNC: 9.1 MG/DL (ref 8.5–10.1)
CHLORIDE SERPL-SCNC: 108 MMOL/L (ref 98–112)
CHOLEST SMN-MCNC: 173 MG/DL (ref ?–200)
CO2 SERPL-SCNC: 26 MMOL/L (ref 21–32)
CREAT BLD-MCNC: 0.7 MG/DL
DEPRECATED RDW RBC AUTO: 42.7 FL (ref 35.1–46.3)
EOSINOPHIL # BLD AUTO: 0.08 X10(3) UL (ref 0–0.7)
EOSINOPHIL NFR BLD AUTO: 1.3 %
ERYTHROCYTE [DISTWIDTH] IN BLOOD BY AUTOMATED COUNT: 12.9 % (ref 11–15)
GLOBULIN PLAS-MCNC: 3.7 G/DL (ref 2.8–4.4)
GLUCOSE BLD-MCNC: 92 MG/DL (ref 70–99)
HCT VFR BLD AUTO: 42.6 %
HDLC SERPL-MCNC: 63 MG/DL (ref 40–59)
HGB BLD-MCNC: 13.8 G/DL
IMM GRANULOCYTES # BLD AUTO: 0.01 X10(3) UL (ref 0–1)
IMM GRANULOCYTES NFR BLD: 0.2 %
LDLC SERPL CALC-MCNC: 97 MG/DL (ref ?–100)
LYMPHOCYTES # BLD AUTO: 2.6 X10(3) UL (ref 1–4)
LYMPHOCYTES NFR BLD AUTO: 42.9 %
M PROTEIN MFR SERPL ELPH: 7.4 G/DL (ref 6.4–8.2)
MCH RBC QN AUTO: 29.4 PG (ref 26–34)
MCHC RBC AUTO-ENTMCNC: 32.4 G/DL (ref 31–37)
MCV RBC AUTO: 90.8 FL
MONOCYTES # BLD AUTO: 0.55 X10(3) UL (ref 0.1–1)
MONOCYTES NFR BLD AUTO: 9.1 %
NEUTROPHILS # BLD AUTO: 2.79 X10 (3) UL (ref 1.5–7.7)
NEUTROPHILS # BLD AUTO: 2.79 X10(3) UL (ref 1.5–7.7)
NEUTROPHILS NFR BLD AUTO: 46 %
NONHDLC SERPL-MCNC: 110 MG/DL (ref ?–130)
OSMOLALITY SERPL CALC.SUM OF ELEC: 291 MOSM/KG (ref 275–295)
PATIENT FASTING Y/N/NP: YES
PATIENT FASTING Y/N/NP: YES
PLATELET # BLD AUTO: 277 10(3)UL (ref 150–450)
POTASSIUM SERPL-SCNC: 4.9 MMOL/L (ref 3.5–5.1)
RBC # BLD AUTO: 4.69 X10(6)UL
SODIUM SERPL-SCNC: 140 MMOL/L (ref 136–145)
TRIGL SERPL-MCNC: 70 MG/DL (ref 30–149)
VIT D+METAB SERPL-MCNC: 35.5 NG/ML (ref 30–100)
VLDLC SERPL CALC-MCNC: 11 MG/DL (ref 0–30)
WBC # BLD AUTO: 6.1 X10(3) UL (ref 4–11)

## 2021-08-07 PROCEDURE — 36415 COLL VENOUS BLD VENIPUNCTURE: CPT

## 2021-08-07 PROCEDURE — 85025 COMPLETE CBC W/AUTO DIFF WBC: CPT

## 2021-08-07 PROCEDURE — 80053 COMPREHEN METABOLIC PANEL: CPT

## 2021-08-07 PROCEDURE — 80061 LIPID PANEL: CPT

## 2021-08-07 PROCEDURE — 82306 VITAMIN D 25 HYDROXY: CPT

## 2021-08-17 ENCOUNTER — OFFICE VISIT (OUTPATIENT)
Dept: DERMATOLOGY CLINIC | Facility: CLINIC | Age: 36
End: 2021-08-17
Payer: COMMERCIAL

## 2021-08-17 VITALS — BODY MASS INDEX: 31.39 KG/M2 | HEIGHT: 67 IN | WEIGHT: 200 LBS

## 2021-08-17 DIAGNOSIS — Z87.2 HISTORY OF SUN-DAMAGED SKIN: ICD-10-CM

## 2021-08-17 DIAGNOSIS — D18.01 HEMANGIOMA OF SKIN AND SUBCUTANEOUS TISSUE: ICD-10-CM

## 2021-08-17 DIAGNOSIS — Z12.83 SKIN CANCER SCREENING: Primary | ICD-10-CM

## 2021-08-17 DIAGNOSIS — D22.9 MULTIPLE BENIGN NEVI: ICD-10-CM

## 2021-08-17 PROCEDURE — 3008F BODY MASS INDEX DOCD: CPT | Performed by: DERMATOLOGY

## 2021-08-17 PROCEDURE — 99395 PREV VISIT EST AGE 18-39: CPT | Performed by: DERMATOLOGY

## 2021-08-17 NOTE — PROGRESS NOTES
f HPI:     Chief Complaint     Full Skin Exam        HPI     Full Skin Exam      Additional comments: No areas of concern, no personal or family hx of skin CA          Last edited by Justyn Rae RN on 8/17/2021  8:12 AM. (History)          Patient prese Smoking status: Former Smoker        Packs/day: 0.50        Years: 15.00        Pack years: 7.5        Quit date: 3/17/2017        Years since quittin.4      Smokeless tobacco: Never Used    Vaping Use      Vaping Use: Never used    Substance and Sexua Violence:       Fear of Current or Ex-Partner:       Emotionally Abused:       Physically Abused:       Sexually Abused:   Family History   Problem Relation Age of Onset   • Breast Cancer Paternal Aunt        PHYSICAL EXAM:   Physical Exam  A complete body visit (from the past 48 hour(s)). Meds This Visit:      Imaging Orders:  None     Referral Orders:  No orders of the defined types were placed in this encounter.         8/17/2021  Corey Mcleod MD

## 2021-08-23 ENCOUNTER — TELEPHONE (OUTPATIENT)
Dept: FAMILY MEDICINE CLINIC | Facility: CLINIC | Age: 36
End: 2021-08-23

## 2021-08-23 DIAGNOSIS — R09.89 SYMPTOMS OF UPPER RESPIRATORY INFECTION (URI): Primary | ICD-10-CM

## 2021-08-27 ENCOUNTER — HOSPITAL ENCOUNTER (OUTPATIENT)
Dept: GENERAL RADIOLOGY | Age: 36
Discharge: HOME | End: 2021-08-27
Attending: FAMILY MEDICINE
Payer: COMMERCIAL

## 2021-08-27 ENCOUNTER — OFFICE VISIT (OUTPATIENT)
Dept: FAMILY MEDICINE CLINIC | Facility: CLINIC | Age: 36
End: 2021-08-27
Payer: COMMERCIAL

## 2021-08-27 VITALS
WEIGHT: 205 LBS | SYSTOLIC BLOOD PRESSURE: 111 MMHG | OXYGEN SATURATION: 97 % | HEART RATE: 79 BPM | DIASTOLIC BLOOD PRESSURE: 75 MMHG | HEIGHT: 67 IN | BODY MASS INDEX: 32.18 KG/M2

## 2021-08-27 DIAGNOSIS — R07.89 CHEST TIGHTNESS: Primary | ICD-10-CM

## 2021-08-27 DIAGNOSIS — R07.89 CHEST TIGHTNESS: ICD-10-CM

## 2021-08-27 PROCEDURE — 3008F BODY MASS INDEX DOCD: CPT | Performed by: FAMILY MEDICINE

## 2021-08-27 PROCEDURE — 99213 OFFICE O/P EST LOW 20 MIN: CPT | Performed by: FAMILY MEDICINE

## 2021-08-27 PROCEDURE — 3078F DIAST BP <80 MM HG: CPT | Performed by: FAMILY MEDICINE

## 2021-08-27 PROCEDURE — 3074F SYST BP LT 130 MM HG: CPT | Performed by: FAMILY MEDICINE

## 2021-08-27 PROCEDURE — 71046 X-RAY EXAM CHEST 2 VIEWS: CPT | Performed by: FAMILY MEDICINE

## 2021-08-27 NOTE — PROGRESS NOTES
Osman Oviedo is a 28year old female.   HPI:   Patient reports 2 weeks history of intermittent chest tightness and shortness of breath, she denies any specific wheezing, no upper respiratory symptoms, she was tested at home for Covid yesterday which was normal and S2 normal.   Pulmonary:      Effort: Pulmonary effort is normal. No respiratory distress. Breath sounds: Normal breath sounds. Chest:      Chest wall: No swelling, tenderness or crepitus.       Breasts: Breasts are symmetrical.         Rig

## 2021-08-30 ENCOUNTER — LAB ENCOUNTER (OUTPATIENT)
Dept: LAB | Age: 36
End: 2021-08-30
Attending: FAMILY MEDICINE
Payer: COMMERCIAL

## 2021-08-30 DIAGNOSIS — R07.89 CHEST TIGHTNESS: ICD-10-CM

## 2021-08-31 LAB — SARS-COV-2 RNA RESP QL NAA+PROBE: NOT DETECTED

## 2021-09-02 ENCOUNTER — HOSPITAL ENCOUNTER (OUTPATIENT)
Dept: RESPIRATORY THERAPY | Facility: HOSPITAL | Age: 36
Discharge: HOME | End: 2021-09-02
Attending: FAMILY MEDICINE
Payer: COMMERCIAL

## 2021-09-02 DIAGNOSIS — R07.89 CHEST TIGHTNESS: ICD-10-CM

## 2021-09-02 PROCEDURE — 94060 EVALUATION OF WHEEZING: CPT | Performed by: INTERNAL MEDICINE

## 2021-09-02 PROCEDURE — 94729 DIFFUSING CAPACITY: CPT | Performed by: INTERNAL MEDICINE

## 2021-09-02 PROCEDURE — 94726 PLETHYSMOGRAPHY LUNG VOLUMES: CPT | Performed by: INTERNAL MEDICINE

## 2021-09-07 NOTE — PROCEDURES
[facility]    Patient's Name Sarita Green MRN [id number]    1985 Pulmonologist Miky Narvaez MD   Location [facility] PCP Tayler Deal     IMPRESSION:    The PFTs are Normal.    There is no sig

## 2021-09-07 NOTE — ADDENDUM NOTE
Encounter addended by: Rafal Gautam MD on: 9/6/2021 10:11 PM   Actions taken: Clinical Note Signed, Charge Capture section accepted

## 2021-09-24 ENCOUNTER — TELEPHONE (OUTPATIENT)
Dept: FAMILY MEDICINE CLINIC | Facility: CLINIC | Age: 36
End: 2021-09-24

## 2021-09-24 NOTE — TELEPHONE ENCOUNTER
Patient scheduled an appointment via [address] on 10/1 for the following symptoms below. Follow up on shortness of breath and other symptoms. 9/24 Per MyChart correspondence she has experienced issues today.

## 2021-09-24 NOTE — TELEPHONE ENCOUNTER
Patient complaint of intermittent shortness of breath that has been worsening for the past three days. Patient seen Dr. Mari Duane on 8/27/21 and chest x-ray and PFT was ordered and both showed no significant abnormalities.    Patient states shortness of breat

## 2021-09-29 ENCOUNTER — OFFICE VISIT (OUTPATIENT)
Dept: FAMILY MEDICINE CLINIC | Facility: CLINIC | Age: 36
End: 2021-09-29
Payer: COMMERCIAL

## 2021-09-29 VITALS
HEART RATE: 79 BPM | OXYGEN SATURATION: 98 % | BODY MASS INDEX: 32.44 KG/M2 | HEIGHT: 67 IN | WEIGHT: 206.69 LBS | DIASTOLIC BLOOD PRESSURE: 73 MMHG | SYSTOLIC BLOOD PRESSURE: 107 MMHG | RESPIRATION RATE: 18 BRPM

## 2021-09-29 DIAGNOSIS — N64.4 MASTALGIA: ICD-10-CM

## 2021-09-29 DIAGNOSIS — M25.512 PAIN, JOINT, SHOULDER, LEFT: Primary | ICD-10-CM

## 2021-09-29 DIAGNOSIS — R06.02 SHORTNESS OF BREATH: ICD-10-CM

## 2021-09-29 DIAGNOSIS — R59.0 AXILLARY ADENOPATHY: ICD-10-CM

## 2021-09-29 PROCEDURE — 99214 OFFICE O/P EST MOD 30 MIN: CPT | Performed by: FAMILY MEDICINE

## 2021-09-29 PROCEDURE — 3078F DIAST BP <80 MM HG: CPT | Performed by: FAMILY MEDICINE

## 2021-09-29 PROCEDURE — 3074F SYST BP LT 130 MM HG: CPT | Performed by: FAMILY MEDICINE

## 2021-09-29 PROCEDURE — 3008F BODY MASS INDEX DOCD: CPT | Performed by: FAMILY MEDICINE

## 2021-09-29 NOTE — PROGRESS NOTES
Shawn Hartmann is a 28year old female.   HPI:   Patient here for follow-up, she continues to have significant chest tightness, she reports that after last evaluation symptoms improve and she was symptomatic for a few weeks but then symptoms record, she st Wt 206 lb 11.2 oz (93.8 kg)   LMP 08/01/2021   SpO2 98%   BMI 32.37 kg/m²     Physical Exam  Vitals and nursing note reviewed. Constitutional:       Appearance: Normal appearance. HENT:      Head: Normocephalic and atraumatic.    Cardiovascular:      R Future    4. Shortness of breath  Overall exam so far has been unremarkable, normal PFTs and normal chest x-ray as well as EKG which is continues to be symptomatic, will evaluate pulmonary rehab as well as echo  - CARD ECHO 2D DOPPLER (CPT=93306);  Future

## 2021-10-11 ENCOUNTER — OFFICE VISIT (OUTPATIENT)
Dept: OBGYN CLINIC | Facility: CLINIC | Age: 36
End: 2021-10-11
Payer: COMMERCIAL

## 2021-10-11 VITALS
DIASTOLIC BLOOD PRESSURE: 80 MMHG | BODY MASS INDEX: 32 KG/M2 | HEART RATE: 67 BPM | SYSTOLIC BLOOD PRESSURE: 116 MMHG | WEIGHT: 204.38 LBS

## 2021-10-11 DIAGNOSIS — Z12.4 SCREENING FOR MALIGNANT NEOPLASM OF CERVIX: ICD-10-CM

## 2021-10-11 DIAGNOSIS — Z01.419 ENCOUNTER FOR GYNECOLOGICAL EXAMINATION WITHOUT ABNORMAL FINDING: Primary | ICD-10-CM

## 2021-10-11 PROCEDURE — 3074F SYST BP LT 130 MM HG: CPT | Performed by: OBSTETRICS & GYNECOLOGY

## 2021-10-11 PROCEDURE — 99395 PREV VISIT EST AGE 18-39: CPT | Performed by: OBSTETRICS & GYNECOLOGY

## 2021-10-11 PROCEDURE — 3079F DIAST BP 80-89 MM HG: CPT | Performed by: OBSTETRICS & GYNECOLOGY

## 2021-10-18 ENCOUNTER — HOSPITAL ENCOUNTER (OUTPATIENT)
Dept: MAMMOGRAPHY | Facility: HOSPITAL | Age: 36
Discharge: HOME | End: 2021-10-18
Attending: FAMILY MEDICINE
Payer: COMMERCIAL

## 2021-10-18 ENCOUNTER — HOSPITAL ENCOUNTER (OUTPATIENT)
Dept: CV DIAGNOSTICS | Facility: HOSPITAL | Age: 36
Discharge: HOME | End: 2021-10-18
Attending: FAMILY MEDICINE
Payer: COMMERCIAL

## 2021-10-18 DIAGNOSIS — N64.4 MASTALGIA: ICD-10-CM

## 2021-10-18 DIAGNOSIS — R06.02 SHORTNESS OF BREATH: ICD-10-CM

## 2021-10-18 PROCEDURE — 76641 ULTRASOUND BREAST COMPLETE: CPT | Performed by: FAMILY MEDICINE

## 2021-10-18 PROCEDURE — 93306 TTE W/DOPPLER COMPLETE: CPT | Performed by: FAMILY MEDICINE

## 2021-10-18 PROCEDURE — 77062 BREAST TOMOSYNTHESIS BI: CPT | Performed by: FAMILY MEDICINE

## 2021-10-18 PROCEDURE — 77066 DX MAMMO INCL CAD BI: CPT | Performed by: FAMILY MEDICINE

## 2021-11-03 PROBLEM — Z34.90 PREGNANCY: Status: RESOLVED | Noted: 2020-03-02 | Resolved: 2021-11-03

## 2021-11-03 PROBLEM — O99.213 OBESITY AFFECTING PREGNANCY IN THIRD TRIMESTER (HCC): Status: RESOLVED | Noted: 2019-12-28 | Resolved: 2021-11-03

## 2021-11-03 PROBLEM — Z34.90 PREGNANCY (HCC): Status: RESOLVED | Noted: 2020-03-02 | Resolved: 2021-11-03

## 2021-11-03 PROBLEM — Z36.89 ENCOUNTER FOR TRIAGE IN PREGNANT PATIENT (HCC): Status: RESOLVED | Noted: 2020-03-02 | Resolved: 2021-11-03

## 2021-11-03 PROBLEM — O99.213 OBESITY AFFECTING PREGNANCY IN THIRD TRIMESTER: Status: RESOLVED | Noted: 2019-12-28 | Resolved: 2021-11-03

## 2021-11-03 PROBLEM — O99.013 ANEMIA DURING PREGNANCY IN THIRD TRIMESTER: Status: RESOLVED | Noted: 2019-12-28 | Resolved: 2021-11-03

## 2021-11-03 PROBLEM — O99.013 ANEMIA DURING PREGNANCY IN THIRD TRIMESTER (HCC): Status: RESOLVED | Noted: 2019-12-28 | Resolved: 2021-11-03

## 2021-11-03 PROBLEM — Z36.89 ENCOUNTER FOR TRIAGE IN PREGNANT PATIENT: Status: RESOLVED | Noted: 2020-03-02 | Resolved: 2021-11-03

## 2021-11-03 NOTE — PROGRESS NOTES
Subjective:   Patient ID: Wyatt Sanchez is a 39year old female. HPI  [id number] [phone] with menses q 28d / 5d / normal. Withdrawal for [facility] and considering 3rd child. Dr Brisa Adam had ordered mammo due to axillary and breast paresthesias. Scheduled 10-18.      Histo

## 2021-11-04 ENCOUNTER — NURSE ONLY (OUTPATIENT)
Dept: LAB | Age: 36
End: 2021-11-04
Attending: FAMILY MEDICINE
Payer: COMMERCIAL

## 2021-11-04 DIAGNOSIS — R09.89 SYMPTOMS OF UPPER RESPIRATORY INFECTION (URI): ICD-10-CM

## 2021-11-15 ENCOUNTER — NURSE ONLY (OUTPATIENT)
Dept: ALLERGY | Facility: CLINIC | Age: 36
End: 2021-11-15
Payer: COMMERCIAL

## 2021-11-15 ENCOUNTER — OFFICE VISIT (OUTPATIENT)
Dept: ALLERGY | Facility: CLINIC | Age: 36
End: 2021-11-15
Payer: COMMERCIAL

## 2021-11-15 VITALS
BODY MASS INDEX: 32.02 KG/M2 | SYSTOLIC BLOOD PRESSURE: 129 MMHG | OXYGEN SATURATION: 99 % | HEIGHT: 67 IN | WEIGHT: 204 LBS | HEART RATE: 91 BPM | DIASTOLIC BLOOD PRESSURE: 83 MMHG

## 2021-11-15 DIAGNOSIS — Z23 FLU VACCINE NEED: ICD-10-CM

## 2021-11-15 DIAGNOSIS — Z91.09 ENVIRONMENTAL ALLERGIES: Primary | ICD-10-CM

## 2021-11-15 DIAGNOSIS — T78.40XA ALLERGY, INITIAL ENCOUNTER: ICD-10-CM

## 2021-11-15 DIAGNOSIS — Z92.29 COVID-19 VACCINE SERIES COMPLETED: ICD-10-CM

## 2021-11-15 DIAGNOSIS — R06.02 SOB (SHORTNESS OF BREATH): ICD-10-CM

## 2021-11-15 PROCEDURE — 3074F SYST BP LT 130 MM HG: CPT | Performed by: ALLERGY & IMMUNOLOGY

## 2021-11-15 PROCEDURE — 3008F BODY MASS INDEX DOCD: CPT | Performed by: ALLERGY & IMMUNOLOGY

## 2021-11-15 PROCEDURE — 3079F DIAST BP 80-89 MM HG: CPT | Performed by: ALLERGY & IMMUNOLOGY

## 2021-11-15 PROCEDURE — 95004 PERQ TESTS W/ALRGNC XTRCS: CPT | Performed by: ALLERGY & IMMUNOLOGY

## 2021-11-15 PROCEDURE — 95024 IQ TESTS W/ALLERGENIC XTRCS: CPT | Performed by: ALLERGY & IMMUNOLOGY

## 2021-11-15 PROCEDURE — 99204 OFFICE O/P NEW MOD 45 MIN: CPT | Performed by: ALLERGY & IMMUNOLOGY

## 2021-11-15 RX ORDER — CETIRIZINE HYDROCHLORIDE 10 MG/1
10 TABLET ORAL DAILY
COMMUNITY
End: 2021-12-14 | Stop reason: ALTCHOICE

## 2021-11-15 RX ORDER — LEVOCETIRIZINE DIHYDROCHLORIDE 5 MG/1
5 TABLET, FILM COATED ORAL NIGHTLY
Qty: 30 TABLET | Refills: 0 | Status: SHIPPED | OUTPATIENT
Start: 2021-11-15 | End: 2021-12-14 | Stop reason: ALTCHOICE

## 2021-11-15 NOTE — PATIENT INSTRUCTIONS
1.  Shortness of breath  See above skin testing to screen for environmental allergy triggers.   This patient had clinically improved with Zyrtec by 50% we will try Xyzal, levocetirizine 5 mg once a night at bedtime as a nonsedating antihistamine  May consid

## 2021-11-15 NOTE — PROGRESS NOTES
Nupur Rios is a 39year old female.     Referred by dr Amy Norris   Duration:sicne 8/2021   Timing: intermittent   Last 1 week at a time   Symptoms: sob, moyer, difficult to take a breath in> out    No wz, ct, leg swelling, dvt , ocp,, f, chills heartbeat/palpitations, chest pain, edema  Constitutional:  Negative night sweats,weight loss, irritability and lethargy  Endocrine:  Negative for cold intolerance, polydipsia and polyphagia  ENMT:  Negative for ear drainage, hearing loss see hpi   Eyes: albuterol. No prior ED visits or prednisone. Denies nocturnal symptoms, wheezing leg swelling. Recent PFT testing chest x-ray and EKG were unremarkable. Patient denies changes in weight. 1 cat in the home.   Normal echocardiogram      Skin testing toda and dosing and potential side effects.  Teaching was provided via the teach back method

## 2021-12-14 ENCOUNTER — OFFICE VISIT (OUTPATIENT)
Dept: FAMILY MEDICINE CLINIC | Facility: CLINIC | Age: 36
End: 2021-12-14
Payer: COMMERCIAL

## 2021-12-14 VITALS
SYSTOLIC BLOOD PRESSURE: 112 MMHG | WEIGHT: 205.88 LBS | HEART RATE: 75 BPM | HEIGHT: 67 IN | RESPIRATION RATE: 17 BRPM | BODY MASS INDEX: 32.31 KG/M2 | DIASTOLIC BLOOD PRESSURE: 77 MMHG

## 2021-12-14 DIAGNOSIS — M54.50 ACUTE RIGHT-SIDED LOW BACK PAIN WITHOUT SCIATICA: ICD-10-CM

## 2021-12-14 DIAGNOSIS — K59.00 CONSTIPATION, UNSPECIFIED CONSTIPATION TYPE: ICD-10-CM

## 2021-12-14 DIAGNOSIS — R35.0 URINARY FREQUENCY: Primary | ICD-10-CM

## 2021-12-14 DIAGNOSIS — R10.2 PELVIC PRESSURE IN FEMALE: ICD-10-CM

## 2021-12-14 PROCEDURE — 3074F SYST BP LT 130 MM HG: CPT | Performed by: FAMILY MEDICINE

## 2021-12-14 PROCEDURE — 82962 GLUCOSE BLOOD TEST: CPT | Performed by: FAMILY MEDICINE

## 2021-12-14 PROCEDURE — 99213 OFFICE O/P EST LOW 20 MIN: CPT | Performed by: FAMILY MEDICINE

## 2021-12-14 PROCEDURE — 81003 URINALYSIS AUTO W/O SCOPE: CPT | Performed by: FAMILY MEDICINE

## 2021-12-14 PROCEDURE — 3008F BODY MASS INDEX DOCD: CPT | Performed by: FAMILY MEDICINE

## 2021-12-14 PROCEDURE — 3078F DIAST BP <80 MM HG: CPT | Performed by: FAMILY MEDICINE

## 2021-12-14 RX ORDER — POLYETHYLENE GLYCOL 3350 17 G/17G
17 POWDER, FOR SOLUTION ORAL DAILY
Qty: 10 PACKET | Refills: 0 | Status: SHIPPED | OUTPATIENT
Start: 2021-12-14 | End: 2021-12-24

## 2021-12-14 NOTE — PROGRESS NOTES
Sarita Green is a 39year old female.   HPI:   Patient reports 1 month history of urinary frequency, no dysuria, she reports low back pain, associated pelvic pressure, no nausea, no vomiting, but she does report that she has tenesmus, she reports that sh appearance. HENT:      Head: Normocephalic and atraumatic. Cardiovascular:      Rate and Rhythm: Normal rate and regular rhythm. Pulses: Normal pulses.       Heart sounds: Normal heart sounds, S1 normal and S2 normal.   Pulmonary:      Effort: Pulm monitor response  - diclofenac 1 % External Gel; Apply 2 g topically 4 (four) times daily. Dispense: 100 g; Refill: 0       The patient indicates understanding of these issues and agrees to the plan.   The patient is asked to contact us back in symptoms wo

## 2022-01-28 ENCOUNTER — TELEPHONE (OUTPATIENT)
Dept: INTERNAL MEDICINE CLINIC | Facility: CLINIC | Age: 37
End: 2022-01-28

## 2022-01-28 ENCOUNTER — TELEMEDICINE (OUTPATIENT)
Dept: FAMILY MEDICINE CLINIC | Facility: CLINIC | Age: 37
End: 2022-01-28

## 2022-01-28 DIAGNOSIS — R05.8 COUGH WITH EXPOSURE TO COVID-19 VIRUS: Primary | ICD-10-CM

## 2022-01-28 DIAGNOSIS — Z20.822 COUGH WITH EXPOSURE TO COVID-19 VIRUS: Primary | ICD-10-CM

## 2022-01-28 PROCEDURE — 99213 OFFICE O/P EST LOW 20 MIN: CPT | Performed by: NURSE PRACTITIONER

## 2022-01-28 RX ORDER — AZITHROMYCIN 250 MG/1
TABLET, FILM COATED ORAL
Qty: 6 TABLET | Refills: 0 | Status: SHIPPED | OUTPATIENT
Start: 2022-01-28 | End: 2022-02-02

## 2022-01-28 NOTE — PROGRESS NOTES
Virtual Visit Check-In    Abel Fails or legal guardian   verbally consents to a Virtual Visit Check-In service on 1/28/2022    Patient understands and accepts financial responsibility for any deductible, co-insurance and/or co-pays associated with [facility] Medications:   •  azithromycin (ZITHROMAX Z-JORGE) 250 MG Oral Tab, Take 2 tablets (500 mg total) by mouth daily for 1 day, THEN 1 tablet (250 mg total) daily for 4 days. , Disp: 6 tablet, Rfl: 0  •  docusate sodium 50 MG Oral Cap, Take 1 capsule (50 mg total symptoms.     Orders Placed This Encounter      COVID-19 Testing by PCR ([facility]) [E]      Meds This Visit:  Requested Prescriptions     Signed Prescriptions Disp Refills   • azithromycin (ZITHROMAX Z-JORGE) 250 MG Oral Tab 6 tablet 0     Sig: Take 2 tablets

## 2022-01-28 NOTE — TELEPHONE ENCOUNTER
Patient calling, identified name and , had Covid booster on Tuesday night .  On Wednesday had fever, body aches, now has sore throat , fever returned ( but lower) runny nose, body aches had subsided, chills     Had been with her nephew on  and he a

## 2022-02-21 ENCOUNTER — OFFICE VISIT (OUTPATIENT)
Dept: FAMILY MEDICINE CLINIC | Facility: CLINIC | Age: 37
End: 2022-02-21
Payer: COMMERCIAL

## 2022-02-21 ENCOUNTER — HOSPITAL ENCOUNTER (OUTPATIENT)
Dept: GENERAL RADIOLOGY | Age: 37
Discharge: HOME | End: 2022-02-21
Attending: FAMILY MEDICINE
Payer: COMMERCIAL

## 2022-02-21 VITALS
HEIGHT: 67 IN | BODY MASS INDEX: 33.54 KG/M2 | HEART RATE: 77 BPM | RESPIRATION RATE: 17 BRPM | DIASTOLIC BLOOD PRESSURE: 66 MMHG | WEIGHT: 213.69 LBS | SYSTOLIC BLOOD PRESSURE: 105 MMHG

## 2022-02-21 DIAGNOSIS — M54.50 ACUTE BILATERAL LOW BACK PAIN WITHOUT SCIATICA: ICD-10-CM

## 2022-02-21 DIAGNOSIS — M54.50 ACUTE BILATERAL LOW BACK PAIN WITHOUT SCIATICA: Primary | ICD-10-CM

## 2022-02-21 DIAGNOSIS — M25.552 HIP PAIN, BILATERAL: ICD-10-CM

## 2022-02-21 DIAGNOSIS — Z87.76 HISTORY OF CONGENITAL DYSPLASIA OF HIP: ICD-10-CM

## 2022-02-21 DIAGNOSIS — M25.551 HIP PAIN, BILATERAL: ICD-10-CM

## 2022-02-21 DIAGNOSIS — E04.1 THYROID CYST: ICD-10-CM

## 2022-02-21 PROCEDURE — 73523 X-RAY EXAM HIPS BI 5/> VIEWS: CPT | Performed by: FAMILY MEDICINE

## 2022-02-21 PROCEDURE — 3074F SYST BP LT 130 MM HG: CPT | Performed by: FAMILY MEDICINE

## 2022-02-21 PROCEDURE — 99213 OFFICE O/P EST LOW 20 MIN: CPT | Performed by: FAMILY MEDICINE

## 2022-02-21 PROCEDURE — 72110 X-RAY EXAM L-2 SPINE 4/>VWS: CPT | Performed by: FAMILY MEDICINE

## 2022-02-21 PROCEDURE — 3078F DIAST BP <80 MM HG: CPT | Performed by: FAMILY MEDICINE

## 2022-02-21 PROCEDURE — 3008F BODY MASS INDEX DOCD: CPT | Performed by: FAMILY MEDICINE

## 2022-03-07 ENCOUNTER — HOSPITAL ENCOUNTER (OUTPATIENT)
Dept: ULTRASOUND IMAGING | Age: 37
Discharge: HOME | End: 2022-03-07
Attending: FAMILY MEDICINE
Payer: COMMERCIAL

## 2022-03-07 DIAGNOSIS — E04.1 THYROID CYST: ICD-10-CM

## 2022-03-07 PROCEDURE — 76536 US EXAM OF HEAD AND NECK: CPT | Performed by: FAMILY MEDICINE

## 2022-03-09 ENCOUNTER — LAB ENCOUNTER (OUTPATIENT)
Dept: LAB | Age: 37
End: 2022-03-09
Attending: FAMILY MEDICINE
Payer: COMMERCIAL

## 2022-03-09 DIAGNOSIS — E04.1 THYROID CYST: ICD-10-CM

## 2022-03-09 LAB — TSI SER-ACNC: 1.07 MIU/ML (ref 0.36–3.74)

## 2022-03-09 PROCEDURE — 36415 COLL VENOUS BLD VENIPUNCTURE: CPT

## 2022-03-09 PROCEDURE — 84443 ASSAY THYROID STIM HORMONE: CPT

## 2022-03-11 ENCOUNTER — LAB ENCOUNTER (OUTPATIENT)
Dept: LAB | Age: 37
End: 2022-03-11
Attending: FAMILY MEDICINE
Payer: COMMERCIAL

## 2022-03-11 DIAGNOSIS — E01.0 THYROMEGALY: ICD-10-CM

## 2022-03-11 LAB — THYROPEROXIDASE AB SERPL-ACNC: 30 U/ML (ref ?–60)

## 2022-03-11 PROCEDURE — 86376 MICROSOMAL ANTIBODY EACH: CPT

## 2022-03-11 PROCEDURE — 36415 COLL VENOUS BLD VENIPUNCTURE: CPT

## 2022-04-07 NOTE — TELEPHONE ENCOUNTER
Can see  her in office today  At  11 am   [place] office     Please  Schedule apt
Dr Brenton El, please advise. OV 11/9/2020. See Mychart message below.  Patient said that since [address], started having middle abdominal pain, above umbilicus, not intense, intermittent, when eating but not all the time, and under breast bone and both breas
Dr. Schultz Gains: Due to high call volume, this message not addressed in timely manner.   Would you be able to add on Thursday 19th?
Left detailed message with MD message below. Advised to chris us back with any questions, concerns.
Ok to see me Marina or [address] office
Patient scheduled her ultrasound for December 1st.  Patient states she has the same symptoms, but now has \"breast pain symptoms\". RN advised to ER if worse. She is refusing right now.     She wants to know if it is ok to wait until December 1st.    Lavinia
Patient to complete ultrasound of the abdomen -order is in the system to evaluate on gallbladder  and organs      Small frequent meals avoid fatty fried food-      Patient to follow-up next week or sooner if any concerns or symptoms  - to go to the emergen
Pt has been placed on MDs schedule today (11/19) at 11:40
Spoke with patient, (  verified ) informed of  Dr. Renaldo Sandhu  instructions below    Patient will come in for the 11:40am appt as per  Dr. Ilana Day to make appt as in [address] , will not let this RN override       ( Informed will have t
Was unable to add on last night.    Appointment placed tonight
PAST SURGICAL HISTORY:  H/O aortic valve replacement 9/22/14    H/O mitral valve replacement 9/22/14    S/P angioplasty with stent 2011    S/P CABG x 1 (2000)

## 2022-05-09 ENCOUNTER — OFFICE VISIT (OUTPATIENT)
Dept: FAMILY MEDICINE CLINIC | Facility: CLINIC | Age: 37
End: 2022-05-09
Payer: COMMERCIAL

## 2022-05-09 ENCOUNTER — NURSE TRIAGE (OUTPATIENT)
Dept: FAMILY MEDICINE CLINIC | Facility: CLINIC | Age: 37
End: 2022-05-09

## 2022-05-09 VITALS
BODY MASS INDEX: 31.71 KG/M2 | SYSTOLIC BLOOD PRESSURE: 117 MMHG | HEIGHT: 67 IN | DIASTOLIC BLOOD PRESSURE: 81 MMHG | HEART RATE: 83 BPM | WEIGHT: 202 LBS

## 2022-05-09 DIAGNOSIS — R19.7 DIARRHEA OF PRESUMED INFECTIOUS ORIGIN: Primary | ICD-10-CM

## 2022-05-09 PROCEDURE — 3074F SYST BP LT 130 MM HG: CPT | Performed by: PHYSICIAN ASSISTANT

## 2022-05-09 PROCEDURE — 99213 OFFICE O/P EST LOW 20 MIN: CPT | Performed by: PHYSICIAN ASSISTANT

## 2022-05-09 PROCEDURE — 3008F BODY MASS INDEX DOCD: CPT | Performed by: PHYSICIAN ASSISTANT

## 2022-05-09 PROCEDURE — 3079F DIAST BP 80-89 MM HG: CPT | Performed by: PHYSICIAN ASSISTANT

## 2022-07-13 ENCOUNTER — OFFICE VISIT (OUTPATIENT)
Dept: FAMILY MEDICINE CLINIC | Facility: CLINIC | Age: 37
End: 2022-07-13
Payer: COMMERCIAL

## 2022-07-13 ENCOUNTER — LAB ENCOUNTER (OUTPATIENT)
Dept: LAB | Age: 37
End: 2022-07-13
Attending: INTERNAL MEDICINE
Payer: COMMERCIAL

## 2022-07-13 VITALS
BODY MASS INDEX: 32.96 KG/M2 | HEIGHT: 67 IN | WEIGHT: 210 LBS | HEART RATE: 64 BPM | SYSTOLIC BLOOD PRESSURE: 129 MMHG | DIASTOLIC BLOOD PRESSURE: 71 MMHG

## 2022-07-13 DIAGNOSIS — E03.9 HYPOTHYROIDISM, ADULT: Primary | ICD-10-CM

## 2022-07-13 DIAGNOSIS — J01.10 ACUTE NON-RECURRENT FRONTAL SINUSITIS: Primary | ICD-10-CM

## 2022-07-13 LAB
T4 FREE SERPL-MCNC: 1 NG/DL (ref 0.8–1.7)
THYROGLOB SERPL-MCNC: 22 U/ML (ref ?–60)
THYROPEROXIDASE AB SERPL-ACNC: <28 U/ML (ref ?–60)
TSI SER-ACNC: 1.02 MIU/ML (ref 0.36–3.74)

## 2022-07-13 PROCEDURE — 86800 THYROGLOBULIN ANTIBODY: CPT

## 2022-07-13 PROCEDURE — 3078F DIAST BP <80 MM HG: CPT | Performed by: PHYSICIAN ASSISTANT

## 2022-07-13 PROCEDURE — 3008F BODY MASS INDEX DOCD: CPT | Performed by: PHYSICIAN ASSISTANT

## 2022-07-13 PROCEDURE — 84443 ASSAY THYROID STIM HORMONE: CPT

## 2022-07-13 PROCEDURE — 84439 ASSAY OF FREE THYROXINE: CPT

## 2022-07-13 PROCEDURE — 86376 MICROSOMAL ANTIBODY EACH: CPT

## 2022-07-13 PROCEDURE — 3074F SYST BP LT 130 MM HG: CPT | Performed by: PHYSICIAN ASSISTANT

## 2022-07-13 PROCEDURE — 99213 OFFICE O/P EST LOW 20 MIN: CPT | Performed by: PHYSICIAN ASSISTANT

## 2022-07-13 PROCEDURE — 36415 COLL VENOUS BLD VENIPUNCTURE: CPT

## 2022-07-13 RX ORDER — AMOXICILLIN 875 MG/1
875 TABLET, COATED ORAL 2 TIMES DAILY
Qty: 20 TABLET | Refills: 0 | Status: SHIPPED | OUTPATIENT
Start: 2022-07-13 | End: 2022-07-23

## 2022-08-18 ENCOUNTER — OFFICE VISIT (OUTPATIENT)
Dept: DERMATOLOGY CLINIC | Facility: CLINIC | Age: 37
End: 2022-08-18
Payer: COMMERCIAL

## 2022-08-18 DIAGNOSIS — D18.01 HEMANGIOMA OF SKIN AND SUBCUTANEOUS TISSUE: ICD-10-CM

## 2022-08-18 DIAGNOSIS — D22.9 MULTIPLE BENIGN NEVI: ICD-10-CM

## 2022-08-18 DIAGNOSIS — Z12.83 SKIN CANCER SCREENING: ICD-10-CM

## 2022-08-18 DIAGNOSIS — L81.4 LENTIGO: ICD-10-CM

## 2022-08-18 DIAGNOSIS — L82.1 SK (SEBORRHEIC KERATOSIS): ICD-10-CM

## 2022-08-18 DIAGNOSIS — Z87.2 HISTORY OF SUN-DAMAGED SKIN: ICD-10-CM

## 2022-08-18 DIAGNOSIS — D23.9 BENIGN NEOPLASM OF SKIN, UNSPECIFIED LOCATION: ICD-10-CM

## 2022-08-18 DIAGNOSIS — D48.5 NEOPLASM OF UNCERTAIN BEHAVIOR OF SKIN: Primary | ICD-10-CM

## 2022-08-18 PROCEDURE — 99213 OFFICE O/P EST LOW 20 MIN: CPT | Performed by: DERMATOLOGY

## 2022-08-18 PROCEDURE — 11102 TANGNTL BX SKIN SINGLE LES: CPT | Performed by: DERMATOLOGY

## 2022-08-18 PROCEDURE — 88305 TISSUE EXAM BY PATHOLOGIST: CPT | Performed by: DERMATOLOGY

## 2022-08-23 NOTE — PROGRESS NOTES
The pathology report from last visit showed  right parietal scalp Intradermal nevus . Please log in test results, send biopsy results letter. Pt to rtc 1 year or prn.

## 2022-08-29 NOTE — PROGRESS NOTES
Operative Report                     Shave/  Tangential biopsy     Clinical diagnosis:    Size of lesion:    Location:pt with irritated nevus, growing traumatized  Spec 1 Description >>>>>: right parietal scalp  Spec 1 Comment: irritated IDN r/o atypical 8mm pink papule    Procedure: With patient in appropriate position the skin of the above was scrubbed with alcohol. Anesthesia was obtained with 1% Xylocaine with epinephrine. The skin surrounding the lesion was placed under tension and the lesion was incised using a #15 scalpel blade. The specimen was sent for histopathologic exam.    Hemostasis was obtained with electrocautery/aluminum chloride. Estimated blood loss less than 2 cc. Biopsy dressed with Polysporin, bandage. Pressure dressing:   No    Complications: None    Written instructions given and reviewed with patient    Await pathology    Contact information reviewed.     Procedural physician:  Osman Emery MD

## 2022-09-12 ENCOUNTER — OFFICE VISIT (OUTPATIENT)
Dept: FAMILY MEDICINE CLINIC | Facility: CLINIC | Age: 37
End: 2022-09-12
Payer: COMMERCIAL

## 2022-09-12 VITALS
HEART RATE: 76 BPM | DIASTOLIC BLOOD PRESSURE: 69 MMHG | BODY MASS INDEX: 33.74 KG/M2 | WEIGHT: 215 LBS | HEIGHT: 67 IN | SYSTOLIC BLOOD PRESSURE: 106 MMHG

## 2022-09-12 DIAGNOSIS — R09.81 NASAL CONGESTION: Primary | ICD-10-CM

## 2022-09-12 DIAGNOSIS — H60.531 ACUTE CONTACT OTITIS EXTERNA OF RIGHT EAR: ICD-10-CM

## 2022-09-12 PROCEDURE — 3078F DIAST BP <80 MM HG: CPT | Performed by: PHYSICIAN ASSISTANT

## 2022-09-12 PROCEDURE — 3008F BODY MASS INDEX DOCD: CPT | Performed by: PHYSICIAN ASSISTANT

## 2022-09-12 PROCEDURE — 99213 OFFICE O/P EST LOW 20 MIN: CPT | Performed by: PHYSICIAN ASSISTANT

## 2022-09-12 PROCEDURE — 3074F SYST BP LT 130 MM HG: CPT | Performed by: PHYSICIAN ASSISTANT

## 2022-09-12 RX ORDER — FLUTICASONE PROPIONATE 50 MCG
2 SPRAY, SUSPENSION (ML) NASAL DAILY
Qty: 16 G | Refills: 2 | Status: SHIPPED | OUTPATIENT
Start: 2022-09-12 | End: 2022-10-12

## 2022-09-19 ENCOUNTER — NURSE TRIAGE (OUTPATIENT)
Dept: FAMILY MEDICINE CLINIC | Facility: CLINIC | Age: 37
End: 2022-09-19

## 2022-09-19 NOTE — TELEPHONE ENCOUNTER
Patient stated that saw Sudha Lund on 9/12/2022 and was prescribed flonase and ear drops. Pressure is gone in the right ear. No ear pain. Right ear still feels clogged and can not hear from the ear. Also still having the vertigo symptoms since 9/10/2022. Occurs usually in the morning when gets up where the room is spinning and lasts a few minutes, then the rest of the day she is fine. Happened during the day after taking a nap and last about 30 minutes. Has taken meclizine 25mg in the past but made her feel worse so stopped taking it, was prescribed physical therapy for it in the past which did help. Patient wanted to know what to do next? Please advise. Sudha Lund not in the office today. Patient has PPO insurance so gave patient number to ENT in the meantime for an appointment. Patient verbalized understanding.

## 2022-09-19 NOTE — TELEPHONE ENCOUNTER
BPPV would  not necessarily produce a sensation of obstruction or sensation of being clogged, if she is able to be reevaluated this week she could be added for a follow-up tomorrow with Sandip Sampson

## 2022-09-19 NOTE — TELEPHONE ENCOUNTER
Spoke to patient and relayed Dr. Anya Tsang message below. Patient verbalized understanding. She made an appointment with ENT for Friday. She is going to keep that appointment and will call back if she thinks she needs a follow up with [facility]

## 2022-09-23 ENCOUNTER — OFFICE VISIT (OUTPATIENT)
Dept: AUDIOLOGY | Facility: CLINIC | Age: 37
End: 2022-09-23

## 2022-09-23 ENCOUNTER — OFFICE VISIT (OUTPATIENT)
Dept: OTOLARYNGOLOGY | Facility: CLINIC | Age: 37
End: 2022-09-23

## 2022-09-23 VITALS — BODY MASS INDEX: 33.74 KG/M2 | TEMPERATURE: 98 F | HEIGHT: 67 IN | WEIGHT: 215 LBS

## 2022-09-23 DIAGNOSIS — H91.90 HEARING LOSS, UNSPECIFIED HEARING LOSS TYPE, UNSPECIFIED LATERALITY: Primary | ICD-10-CM

## 2022-09-23 DIAGNOSIS — H69.91 EUSTACHIAN TUBE DISORDER, RIGHT: Primary | ICD-10-CM

## 2022-09-23 DIAGNOSIS — H69.91 DISORDER OF RIGHT EUSTACHIAN TUBE: ICD-10-CM

## 2022-09-23 DIAGNOSIS — R42 VERTIGO: ICD-10-CM

## 2022-09-23 PROCEDURE — 92567 TYMPANOMETRY: CPT | Performed by: AUDIOLOGIST

## 2022-09-23 PROCEDURE — 92504 EAR MICROSCOPY EXAMINATION: CPT | Performed by: STUDENT IN AN ORGANIZED HEALTH CARE EDUCATION/TRAINING PROGRAM

## 2022-09-23 PROCEDURE — 3008F BODY MASS INDEX DOCD: CPT | Performed by: STUDENT IN AN ORGANIZED HEALTH CARE EDUCATION/TRAINING PROGRAM

## 2022-09-23 PROCEDURE — 99203 OFFICE O/P NEW LOW 30 MIN: CPT | Performed by: STUDENT IN AN ORGANIZED HEALTH CARE EDUCATION/TRAINING PROGRAM

## 2022-09-23 PROCEDURE — 92557 COMPREHENSIVE HEARING TEST: CPT | Performed by: AUDIOLOGIST

## 2022-10-03 ENCOUNTER — TELEPHONE (OUTPATIENT)
Dept: OBGYN CLINIC | Facility: CLINIC | Age: 37
End: 2022-10-03

## 2022-10-03 NOTE — TELEPHONE ENCOUNTER
LMP 8/29, periods every 28-30d (5w0d). Pt delivered with us in the past.  OBN appt made on 10/25. Pt already taking pnv with dha, folic acid and iron. Pt asked if she can take zyrtec for allergies. Pt advised this is fine to take during pregnancy. Pt advised to call with questions or concerns.

## 2022-10-25 ENCOUNTER — NURSE ONLY (OUTPATIENT)
Dept: OBGYN CLINIC | Facility: CLINIC | Age: 37
End: 2022-10-25
Payer: COMMERCIAL

## 2022-10-25 DIAGNOSIS — Z34.81 ENCOUNTER FOR SUPERVISION OF OTHER NORMAL PREGNANCY IN FIRST TRIMESTER: Primary | ICD-10-CM

## 2022-10-25 RX ORDER — CHOLECALCIFEROL (VITAMIN D3) 25 MCG
1 TABLET,CHEWABLE ORAL DAILY
COMMUNITY

## 2022-10-28 ENCOUNTER — LAB ENCOUNTER (OUTPATIENT)
Dept: LAB | Age: 37
End: 2022-10-28
Attending: OBSTETRICS & GYNECOLOGY
Payer: COMMERCIAL

## 2022-10-28 DIAGNOSIS — Z34.81 ENCOUNTER FOR SUPERVISION OF OTHER NORMAL PREGNANCY IN FIRST TRIMESTER: ICD-10-CM

## 2022-10-28 LAB
ANTIBODY SCREEN: NEGATIVE
BASOPHILS # BLD AUTO: 0.04 X10(3) UL (ref 0–0.2)
BASOPHILS NFR BLD AUTO: 0.5 %
DEPRECATED RDW RBC AUTO: 44.5 FL (ref 35.1–46.3)
EOSINOPHIL # BLD AUTO: 0.04 X10(3) UL (ref 0–0.7)
EOSINOPHIL NFR BLD AUTO: 0.5 %
ERYTHROCYTE [DISTWIDTH] IN BLOOD BY AUTOMATED COUNT: 13.1 % (ref 11–15)
GLUCOSE 1H P GLC SERPL-MCNC: 84 MG/DL
HBV SURFACE AG SER-ACNC: <0.1 [IU]/L
HBV SURFACE AG SERPL QL IA: NONREACTIVE
HCG SERPL QL: POSITIVE
HCT VFR BLD AUTO: 40.6 %
HCV AB SERPL QL IA: NONREACTIVE
HGB BLD-MCNC: 13.5 G/DL
IMM GRANULOCYTES # BLD AUTO: 0.01 X10(3) UL (ref 0–1)
IMM GRANULOCYTES NFR BLD: 0.1 %
LYMPHOCYTES # BLD AUTO: 1.76 X10(3) UL (ref 1–4)
LYMPHOCYTES NFR BLD AUTO: 23 %
MCH RBC QN AUTO: 30.6 PG (ref 26–34)
MCHC RBC AUTO-ENTMCNC: 33.3 G/DL (ref 31–37)
MCV RBC AUTO: 92.1 FL
MONOCYTES # BLD AUTO: 0.63 X10(3) UL (ref 0.1–1)
MONOCYTES NFR BLD AUTO: 8.2 %
NEUTROPHILS # BLD AUTO: 5.18 X10 (3) UL (ref 1.5–7.7)
NEUTROPHILS # BLD AUTO: 5.18 X10(3) UL (ref 1.5–7.7)
NEUTROPHILS NFR BLD AUTO: 67.7 %
PLATELET # BLD AUTO: 256 10(3)UL (ref 150–450)
RBC # BLD AUTO: 4.41 X10(6)UL
RH BLOOD TYPE: POSITIVE
RUBV IGG SER QL: POSITIVE
RUBV IGG SER-ACNC: 30.1 IU/ML (ref 10–?)
WBC # BLD AUTO: 7.7 X10(3) UL (ref 4–11)

## 2022-10-28 PROCEDURE — 86850 RBC ANTIBODY SCREEN: CPT

## 2022-10-28 PROCEDURE — 86762 RUBELLA ANTIBODY: CPT

## 2022-10-28 PROCEDURE — 85025 COMPLETE CBC W/AUTO DIFF WBC: CPT

## 2022-10-28 PROCEDURE — 84703 CHORIONIC GONADOTROPIN ASSAY: CPT

## 2022-10-28 PROCEDURE — 86780 TREPONEMA PALLIDUM: CPT

## 2022-10-28 PROCEDURE — 86901 BLOOD TYPING SEROLOGIC RH(D): CPT

## 2022-10-28 PROCEDURE — 87340 HEPATITIS B SURFACE AG IA: CPT

## 2022-10-28 PROCEDURE — 86900 BLOOD TYPING SEROLOGIC ABO: CPT

## 2022-10-28 PROCEDURE — 87389 HIV-1 AG W/HIV-1&-2 AB AG IA: CPT

## 2022-10-28 PROCEDURE — 82950 GLUCOSE TEST: CPT

## 2022-10-28 PROCEDURE — 36415 COLL VENOUS BLD VENIPUNCTURE: CPT

## 2022-10-28 PROCEDURE — 87086 URINE CULTURE/COLONY COUNT: CPT

## 2022-10-28 PROCEDURE — 86803 HEPATITIS C AB TEST: CPT

## 2022-10-31 LAB — T PALLIDUM AB SER QL: NEGATIVE

## 2022-11-02 ENCOUNTER — TELEPHONE (OUTPATIENT)
Dept: OBGYN CLINIC | Facility: CLINIC | Age: 37
End: 2022-11-02

## 2022-11-02 ENCOUNTER — INITIAL PRENATAL (OUTPATIENT)
Dept: OBGYN CLINIC | Facility: CLINIC | Age: 37
End: 2022-11-02
Payer: COMMERCIAL

## 2022-11-02 VITALS
DIASTOLIC BLOOD PRESSURE: 80 MMHG | HEART RATE: 77 BPM | WEIGHT: 213 LBS | BODY MASS INDEX: 33 KG/M2 | SYSTOLIC BLOOD PRESSURE: 123 MMHG

## 2022-11-02 DIAGNOSIS — Z34.91 ENCOUNTER FOR SUPERVISION OF NORMAL PREGNANCY IN FIRST TRIMESTER, UNSPECIFIED GRAVIDITY: Primary | ICD-10-CM

## 2022-11-02 DIAGNOSIS — O09.529 AMA (ADVANCED MATERNAL AGE) MULTIGRAVIDA 35+, UNSPECIFIED TRIMESTER: Primary | ICD-10-CM

## 2022-11-02 PROBLEM — Z12.83 SKIN CANCER SCREENING: Status: RESOLVED | Noted: 2017-03-20 | Resolved: 2022-11-02

## 2022-11-02 PROBLEM — O09.521 MULTIGRAVIDA OF ADVANCED MATERNAL AGE IN FIRST TRIMESTER: Status: ACTIVE | Noted: 2022-11-02

## 2022-11-02 PROBLEM — O09.521 MULTIGRAVIDA OF ADVANCED MATERNAL AGE IN FIRST TRIMESTER (HCC): Status: ACTIVE | Noted: 2022-11-02

## 2022-11-02 LAB
APPEARANCE: CLEAR
BILIRUBIN: NEGATIVE
GLUCOSE (URINE DIPSTICK): NEGATIVE MG/DL
KETONES (URINE DIPSTICK): NEGATIVE MG/DL
LEUKOCYTES: NEGATIVE
MULTISTIX LOT#: NORMAL NUMERIC
NITRITE, URINE: NEGATIVE
OCCULT BLOOD: NEGATIVE
PH, URINE: 7 (ref 4.5–8)
PROTEIN (URINE DIPSTICK): NEGATIVE MG/DL
SPECIFIC GRAVITY: 1.01 (ref 1–1.03)
URINE-COLOR: YELLOW
UROBILINOGEN,SEMI-QN: 0.2 MG/DL (ref 0–1.9)

## 2022-11-02 PROCEDURE — 3074F SYST BP LT 130 MM HG: CPT | Performed by: OBSTETRICS & GYNECOLOGY

## 2022-11-02 PROCEDURE — 76815 OB US LIMITED FETUS(S): CPT | Performed by: OBSTETRICS & GYNECOLOGY

## 2022-11-02 PROCEDURE — 81002 URINALYSIS NONAUTO W/O SCOPE: CPT | Performed by: OBSTETRICS & GYNECOLOGY

## 2022-11-02 PROCEDURE — 3079F DIAST BP 80-89 MM HG: CPT | Performed by: OBSTETRICS & GYNECOLOGY

## 2022-11-02 NOTE — PROGRESS NOTES
New OB. Wants genetics. AMA and elevated BMI to PL.  BSUS shows SLUIP S=D +FHT. PE wnl. Pap and cultures today. Pt requested pap despite having last year due to LEEP hx.  RTC 4 wks.

## 2022-11-03 LAB
C TRACH DNA SPEC QL NAA+PROBE: NEGATIVE
HPV I/H RISK 1 DNA SPEC QL NAA+PROBE: NEGATIVE
N GONORRHOEA DNA SPEC QL NAA+PROBE: NEGATIVE

## 2022-11-04 LAB — T VAGINALIS RRNA SPEC QL NAA+PROBE: NEGATIVE

## 2022-11-21 ENCOUNTER — HOSPITAL ENCOUNTER (OUTPATIENT)
Dept: PERINATAL CARE | Facility: HOSPITAL | Age: 37
End: 2022-11-21
Attending: OBSTETRICS & GYNECOLOGY
Payer: COMMERCIAL

## 2022-11-21 ENCOUNTER — HOSPITAL ENCOUNTER (OUTPATIENT)
Dept: PERINATAL CARE | Facility: HOSPITAL | Age: 37
Discharge: HOME | End: 2022-11-21
Attending: OBSTETRICS & GYNECOLOGY
Payer: COMMERCIAL

## 2022-11-21 DIAGNOSIS — O09.521 MULTIGRAVIDA OF ADVANCED MATERNAL AGE IN FIRST TRIMESTER: Primary | ICD-10-CM

## 2022-11-21 DIAGNOSIS — O09.521 MULTIGRAVIDA OF ADVANCED MATERNAL AGE IN FIRST TRIMESTER: ICD-10-CM

## 2022-11-21 PROCEDURE — 76813 OB US NUCHAL MEAS 1 GEST: CPT | Performed by: OBSTETRICS & GYNECOLOGY

## 2022-11-30 ENCOUNTER — ROUTINE PRENATAL (OUTPATIENT)
Dept: OBGYN CLINIC | Facility: CLINIC | Age: 37
End: 2022-11-30
Payer: COMMERCIAL

## 2022-11-30 ENCOUNTER — TELEPHONE (OUTPATIENT)
Dept: PERINATAL CARE | Facility: HOSPITAL | Age: 37
End: 2022-11-30

## 2022-11-30 VITALS
SYSTOLIC BLOOD PRESSURE: 130 MMHG | HEART RATE: 94 BPM | BODY MASS INDEX: 33 KG/M2 | DIASTOLIC BLOOD PRESSURE: 82 MMHG | WEIGHT: 213.19 LBS

## 2022-11-30 DIAGNOSIS — Z98.890 H/O LEEP: ICD-10-CM

## 2022-11-30 DIAGNOSIS — Z34.91 ENCOUNTER FOR SUPERVISION OF NORMAL PREGNANCY IN FIRST TRIMESTER, UNSPECIFIED GRAVIDITY: Primary | ICD-10-CM

## 2022-11-30 DIAGNOSIS — O09.521 MULTIGRAVIDA OF ADVANCED MATERNAL AGE IN FIRST TRIMESTER: ICD-10-CM

## 2022-11-30 LAB
BILIRUBIN: NEGATIVE
GLUCOSE (URINE DIPSTICK): NEGATIVE MG/DL
KETONES (URINE DIPSTICK): NEGATIVE MG/DL
LEUKOCYTES: NEGATIVE
MULTISTIX EXPIRATION DATE: ABNORMAL DATE
MULTISTIX LOT#: 4023 NUMERIC
NITRITE, URINE: NEGATIVE
PH, URINE: 5 (ref 4.5–8)
PROTEIN (URINE DIPSTICK): NEGATIVE MG/DL
SPECIFIC GRAVITY: 1.02 (ref 1–1.03)
UROBILINOGEN,SEMI-QN: 0.2 MG/DL (ref 0–1.9)

## 2022-11-30 PROCEDURE — 81002 URINALYSIS NONAUTO W/O SCOPE: CPT | Performed by: OBSTETRICS & GYNECOLOGY

## 2022-11-30 PROCEDURE — 3079F DIAST BP 80-89 MM HG: CPT | Performed by: OBSTETRICS & GYNECOLOGY

## 2022-11-30 PROCEDURE — 3075F SYST BP GE 130 - 139MM HG: CPT | Performed by: OBSTETRICS & GYNECOLOGY

## 2022-11-30 NOTE — TELEPHONE ENCOUNTER
Panorama screening results obt  Reviewed by DR Chuy Eli    Results:  low risk  Low Risk for Aneuploidies, triploidy and Monosomy X  Fetal Sex: not chose  Fetal Fraction: 8.2%      Pt states understanding  Copy of results sent for scanning into pt record

## 2022-12-08 ENCOUNTER — PATIENT MESSAGE (OUTPATIENT)
Dept: OBGYN CLINIC | Facility: CLINIC | Age: 37
End: 2022-12-08

## 2022-12-08 DIAGNOSIS — R10.2 PELVIC PRESSURE IN FEMALE: Primary | ICD-10-CM

## 2022-12-08 DIAGNOSIS — R35.0 URINARY FREQUENCY: ICD-10-CM

## 2022-12-08 NOTE — TELEPHONE ENCOUNTER
From: Ryan Rivera  To: Christian Orozco DO  Sent: 12/8/2022 10:54 AM CST  Subject: Possible UTI    Hello,    I am 14 weeks pregnant and have been having some lower back pain the last few days accompanied by some pressure in my abdomen. It is not cramping, just more pressure than usual. I am urinating more frequently, but am unsure if it is pregnancy related or something else. Please advise at your earliest convenience.     Thank you!!!

## 2022-12-08 NOTE — TELEPHONE ENCOUNTER
Patient 14w3d messaging with pelvic and low back pressure, side discomfort with urinary frequency. She denies pain or VB. Directed to push fluids. Order placed for UA. To JMN-any further recs or ok to wait for UA?

## 2022-12-09 ENCOUNTER — LAB ENCOUNTER (OUTPATIENT)
Dept: LAB | Age: 37
End: 2022-12-09
Attending: OBSTETRICS & GYNECOLOGY
Payer: COMMERCIAL

## 2022-12-09 DIAGNOSIS — R10.2 PELVIC PRESSURE IN FEMALE: ICD-10-CM

## 2022-12-09 DIAGNOSIS — R35.0 URINARY FREQUENCY: ICD-10-CM

## 2022-12-09 LAB
BILIRUB UR QL: NEGATIVE
COLOR UR: YELLOW
GLUCOSE UR-MCNC: NEGATIVE MG/DL
HGB UR QL STRIP.AUTO: NEGATIVE
KETONES UR-MCNC: NEGATIVE MG/DL
LEUKOCYTE ESTERASE UR QL STRIP.AUTO: NEGATIVE
NITRITE UR QL STRIP.AUTO: NEGATIVE
PH UR: 7 [PH] (ref 5–8)
PROT UR-MCNC: NEGATIVE MG/DL
SP GR UR STRIP: 1.02 (ref 1–1.03)
UROBILINOGEN UR STRIP-ACNC: <2
VIT C UR-MCNC: NEGATIVE MG/DL

## 2022-12-09 PROCEDURE — 87086 URINE CULTURE/COLONY COUNT: CPT

## 2022-12-09 PROCEDURE — 81001 URINALYSIS AUTO W/SCOPE: CPT

## 2022-12-09 NOTE — TELEPHONE ENCOUNTER
Component      Latest Ref Rng & Units 12/9/2022   Color Urine      Yellow Yellow   Clarity Urine      Clear Hazy (A)   Spec Gravity      1.001 - 1.030 1.023   Glucose Urine      Negative mg/dL Negative   Bilirubin Urine      Negative Negative   Ketones, UA      Negative mg/dL Negative   Blood Urine      Negative Negative   PH Urine      5.0 - 8.0 7.0   Protein Urine      Negative mg/dL Negative   Urobilinogen Urine      <2.0 <2.0   Nitrite Urine      Negative Negative   Leukocyte Esterase Urine      Negative Negative   WBC Urine      0 - 5 /HPF 1-5   RBC Urine      0 - 2 /HPF 0-2   Bacteria Urine      None Seen /HPF Rare (A)   SQUAM EPI CELLS UR      None Seen /HPF Few (A)   ASCORBIC ACID      Negative mg/dL Negative     Pt 14w4d, messaging yesterday with pelvic pressure, back and flank discomfort and urinary frequency. Denies VB or pain. PN 12/29/22. To [facility] on call- see UA results. Anything further to do regarding symptoms?

## 2022-12-21 ENCOUNTER — HOSPITAL ENCOUNTER (OUTPATIENT)
Dept: PERINATAL CARE | Facility: HOSPITAL | Age: 37
Discharge: HOME | End: 2022-12-21
Attending: OBSTETRICS & GYNECOLOGY
Payer: COMMERCIAL

## 2022-12-21 VITALS
DIASTOLIC BLOOD PRESSURE: 78 MMHG | WEIGHT: 219 LBS | BODY MASS INDEX: 34 KG/M2 | HEART RATE: 93 BPM | SYSTOLIC BLOOD PRESSURE: 132 MMHG

## 2022-12-21 DIAGNOSIS — O09.521 MULTIGRAVIDA OF ADVANCED MATERNAL AGE IN FIRST TRIMESTER: ICD-10-CM

## 2022-12-21 DIAGNOSIS — Z98.890 H/O LEEP: ICD-10-CM

## 2022-12-21 DIAGNOSIS — Z98.890 H/O LEEP: Primary | ICD-10-CM

## 2022-12-21 PROCEDURE — 76815 OB US LIMITED FETUS(S): CPT

## 2022-12-21 PROCEDURE — 76817 TRANSVAGINAL US OBSTETRIC: CPT | Performed by: OBSTETRICS & GYNECOLOGY

## 2022-12-21 NOTE — ADDENDUM NOTE
Encounter addended by: Fabrizio Jimenez on: 12/21/2022 11:47 AM   Actions taken: Visit diagnoses modified

## 2022-12-29 ENCOUNTER — ROUTINE PRENATAL (OUTPATIENT)
Dept: OBGYN CLINIC | Facility: CLINIC | Age: 37
End: 2022-12-29
Payer: COMMERCIAL

## 2022-12-29 VITALS
WEIGHT: 218.81 LBS | BODY MASS INDEX: 34 KG/M2 | SYSTOLIC BLOOD PRESSURE: 129 MMHG | DIASTOLIC BLOOD PRESSURE: 77 MMHG | HEART RATE: 102 BPM

## 2022-12-29 DIAGNOSIS — Z34.92 ENCOUNTER FOR SUPERVISION OF NORMAL PREGNANCY IN SECOND TRIMESTER, UNSPECIFIED GRAVIDITY: Primary | ICD-10-CM

## 2022-12-29 PROBLEM — Z87.2 HISTORY OF SUN-DAMAGED SKIN: Status: RESOLVED | Noted: 2021-08-17 | Resolved: 2022-12-29

## 2022-12-29 PROBLEM — L81.4 SOLAR LENTIGO: Status: RESOLVED | Noted: 2018-04-24 | Resolved: 2022-12-29

## 2022-12-29 PROBLEM — D64.9 ANEMIA: Status: RESOLVED | Noted: 2020-06-16 | Resolved: 2022-12-29

## 2022-12-29 PROBLEM — M25.511 CHRONIC RIGHT SHOULDER PAIN: Status: RESOLVED | Noted: 2020-11-09 | Resolved: 2022-12-29

## 2022-12-29 PROBLEM — G43.909 MIGRAINE: Status: RESOLVED | Noted: 2020-07-29 | Resolved: 2022-12-29

## 2022-12-29 PROBLEM — K21.9 GASTROESOPHAGEAL REFLUX DISEASE WITHOUT ESOPHAGITIS: Status: RESOLVED | Noted: 2019-04-30 | Resolved: 2022-12-29

## 2022-12-29 PROBLEM — G43.109 MIGRAINE WITH AURA AND WITHOUT STATUS MIGRAINOSUS, NOT INTRACTABLE: Status: RESOLVED | Noted: 2020-06-16 | Resolved: 2022-12-29

## 2022-12-29 PROBLEM — R20.2 TINGLING OF SKIN: Status: RESOLVED | Noted: 2020-07-29 | Resolved: 2022-12-29

## 2022-12-29 PROBLEM — D22.4 IRRITATED NEVUS OF SCALP: Status: RESOLVED | Noted: 2019-08-15 | Resolved: 2022-12-29

## 2022-12-29 PROBLEM — G89.29 CHRONIC RIGHT SHOULDER PAIN: Status: RESOLVED | Noted: 2020-11-09 | Resolved: 2022-12-29

## 2022-12-29 LAB
APPEARANCE: CLEAR
BILIRUBIN: NEGATIVE
GLUCOSE (URINE DIPSTICK): NEGATIVE MG/DL
KETONES (URINE DIPSTICK): NEGATIVE MG/DL
LEUKOCYTES: NEGATIVE
MULTISTIX LOT#: NORMAL NUMERIC
NITRITE, URINE: NEGATIVE
OCCULT BLOOD: NEGATIVE
PH, URINE: 5 (ref 4.5–8)
PROTEIN (URINE DIPSTICK): NEGATIVE MG/DL
SPECIFIC GRAVITY: 1.01 (ref 1–1.03)
URINE-COLOR: YELLOW
UROBILINOGEN,SEMI-QN: 0.2 MG/DL (ref 0–1.9)

## 2022-12-29 PROCEDURE — 3078F DIAST BP <80 MM HG: CPT | Performed by: OBSTETRICS & GYNECOLOGY

## 2022-12-29 PROCEDURE — 3074F SYST BP LT 130 MM HG: CPT | Performed by: OBSTETRICS & GYNECOLOGY

## 2022-12-29 PROCEDURE — 81002 URINALYSIS NONAUTO W/O SCOPE: CPT | Performed by: OBSTETRICS & GYNECOLOGY

## 2023-01-16 ENCOUNTER — HOSPITAL ENCOUNTER (OUTPATIENT)
Dept: PERINATAL CARE | Facility: HOSPITAL | Age: 38
End: 2023-01-16
Attending: OBSTETRICS & GYNECOLOGY
Payer: COMMERCIAL

## 2023-01-16 ENCOUNTER — HOSPITAL ENCOUNTER (OUTPATIENT)
Dept: PERINATAL CARE | Facility: HOSPITAL | Age: 38
Discharge: HOME | End: 2023-01-16
Attending: OBSTETRICS & GYNECOLOGY
Payer: COMMERCIAL

## 2023-01-16 VITALS
SYSTOLIC BLOOD PRESSURE: 132 MMHG | DIASTOLIC BLOOD PRESSURE: 68 MMHG | WEIGHT: 218 LBS | HEART RATE: 92 BPM | BODY MASS INDEX: 34 KG/M2

## 2023-01-16 DIAGNOSIS — Z98.890 H/O LEEP: ICD-10-CM

## 2023-01-16 DIAGNOSIS — O09.522 MULTIGRAVIDA OF ADVANCED MATERNAL AGE IN SECOND TRIMESTER: Primary | ICD-10-CM

## 2023-01-16 DIAGNOSIS — O09.521 MULTIGRAVIDA OF ADVANCED MATERNAL AGE IN FIRST TRIMESTER: ICD-10-CM

## 2023-01-16 PROCEDURE — 76811 OB US DETAILED SNGL FETUS: CPT | Performed by: OBSTETRICS & GYNECOLOGY

## 2023-01-26 ENCOUNTER — ROUTINE PRENATAL (OUTPATIENT)
Dept: OBGYN CLINIC | Facility: CLINIC | Age: 38
End: 2023-01-26

## 2023-01-26 VITALS
WEIGHT: 224 LBS | SYSTOLIC BLOOD PRESSURE: 108 MMHG | HEART RATE: 71 BPM | BODY MASS INDEX: 35 KG/M2 | DIASTOLIC BLOOD PRESSURE: 72 MMHG

## 2023-01-26 DIAGNOSIS — Z34.82 ENCOUNTER FOR SUPERVISION OF OTHER NORMAL PREGNANCY IN SECOND TRIMESTER: Primary | ICD-10-CM

## 2023-01-26 LAB
APPEARANCE: CLEAR
GLUCOSE (URINE DIPSTICK): NEGATIVE MG/DL
MULTISTIX LOT#: NORMAL NUMERIC
NITRITE, URINE: NEGATIVE
URINE-COLOR: YELLOW

## 2023-01-26 PROCEDURE — 3074F SYST BP LT 130 MM HG: CPT | Performed by: OBSTETRICS & GYNECOLOGY

## 2023-01-26 PROCEDURE — 3078F DIAST BP <80 MM HG: CPT | Performed by: OBSTETRICS & GYNECOLOGY

## 2023-01-26 PROCEDURE — 81002 URINALYSIS NONAUTO W/O SCOPE: CPT | Performed by: OBSTETRICS & GYNECOLOGY

## 2023-02-24 ENCOUNTER — ROUTINE PRENATAL (OUTPATIENT)
Dept: OBGYN CLINIC | Facility: CLINIC | Age: 38
End: 2023-02-24

## 2023-02-24 VITALS
DIASTOLIC BLOOD PRESSURE: 72 MMHG | HEART RATE: 73 BPM | BODY MASS INDEX: 36 KG/M2 | SYSTOLIC BLOOD PRESSURE: 106 MMHG | WEIGHT: 228 LBS

## 2023-02-24 DIAGNOSIS — Z34.82 ENCOUNTER FOR SUPERVISION OF OTHER NORMAL PREGNANCY IN SECOND TRIMESTER: Primary | ICD-10-CM

## 2023-02-24 LAB
APPEARANCE: CLEAR
GLUCOSE (URINE DIPSTICK): NEGATIVE MG/DL
KETONES (URINE DIPSTICK): NEGATIVE MG/DL
MULTISTIX LOT#: NORMAL NUMERIC
NITRITE, URINE: NEGATIVE
PROTEIN (URINE DIPSTICK): NEGATIVE MG/DL

## 2023-02-24 PROCEDURE — 3074F SYST BP LT 130 MM HG: CPT | Performed by: OBSTETRICS & GYNECOLOGY

## 2023-02-24 PROCEDURE — 3078F DIAST BP <80 MM HG: CPT | Performed by: OBSTETRICS & GYNECOLOGY

## 2023-02-24 PROCEDURE — 81002 URINALYSIS NONAUTO W/O SCOPE: CPT | Performed by: OBSTETRICS & GYNECOLOGY

## 2023-02-28 ENCOUNTER — PATIENT MESSAGE (OUTPATIENT)
Dept: OBGYN CLINIC | Facility: CLINIC | Age: 38
End: 2023-02-28

## 2023-02-28 NOTE — TELEPHONE ENCOUNTER
Pt planning car trip 11 hrs away when she is 31 wks pregnant. Pt AMA, HX of Leep.  with hx of two term NSVDs. To [facility] on-call if any restrictions beside PTL, KICK counts and DVT prevention? Thank you.

## 2023-03-10 ENCOUNTER — LAB ENCOUNTER (OUTPATIENT)
Dept: LAB | Age: 38
End: 2023-03-10
Attending: OBSTETRICS & GYNECOLOGY
Payer: COMMERCIAL

## 2023-03-10 DIAGNOSIS — Z34.82 ENCOUNTER FOR SUPERVISION OF OTHER NORMAL PREGNANCY IN SECOND TRIMESTER: ICD-10-CM

## 2023-03-10 LAB
DEPRECATED RDW RBC AUTO: 48 FL (ref 35.1–46.3)
ERYTHROCYTE [DISTWIDTH] IN BLOOD BY AUTOMATED COUNT: 14.1 % (ref 11–15)
GLUCOSE 1H P GLC SERPL-MCNC: 97 MG/DL
HCT VFR BLD AUTO: 38.5 %
HGB BLD-MCNC: 12.9 G/DL
MCH RBC QN AUTO: 31.1 PG (ref 26–34)
MCHC RBC AUTO-ENTMCNC: 33.5 G/DL (ref 31–37)
MCV RBC AUTO: 92.8 FL
PLATELET # BLD AUTO: 222 10(3)UL (ref 150–450)
RBC # BLD AUTO: 4.15 X10(6)UL
WBC # BLD AUTO: 9.2 X10(3) UL (ref 4–11)

## 2023-03-10 PROCEDURE — 82950 GLUCOSE TEST: CPT

## 2023-03-10 PROCEDURE — 85027 COMPLETE CBC AUTOMATED: CPT

## 2023-03-10 PROCEDURE — 36415 COLL VENOUS BLD VENIPUNCTURE: CPT

## 2023-03-17 ENCOUNTER — ROUTINE PRENATAL (OUTPATIENT)
Dept: OBGYN CLINIC | Facility: CLINIC | Age: 38
End: 2023-03-17

## 2023-03-17 VITALS
DIASTOLIC BLOOD PRESSURE: 74 MMHG | BODY MASS INDEX: 37 KG/M2 | SYSTOLIC BLOOD PRESSURE: 108 MMHG | WEIGHT: 233.81 LBS | HEART RATE: 86 BPM

## 2023-03-17 DIAGNOSIS — Z34.82 ENCOUNTER FOR SUPERVISION OF OTHER NORMAL PREGNANCY IN SECOND TRIMESTER: ICD-10-CM

## 2023-03-17 DIAGNOSIS — Z34.92 ENCOUNTER FOR SUPERVISION OF NORMAL PREGNANCY IN SECOND TRIMESTER, UNSPECIFIED GRAVIDITY: Primary | ICD-10-CM

## 2023-03-17 LAB
BILIRUBIN: NEGATIVE
GLUCOSE (URINE DIPSTICK): NEGATIVE MG/DL
KETONES (URINE DIPSTICK): NEGATIVE MG/DL
LEUKOCYTES: NEGATIVE
MULTISTIX EXPIRATION DATE: NORMAL DATE
MULTISTIX LOT#: 57 NUMERIC
NITRITE, URINE: NEGATIVE
OCCULT BLOOD: NEGATIVE
PH, URINE: 7.5 (ref 4.5–8)
PROTEIN (URINE DIPSTICK): NEGATIVE MG/DL
SPECIFIC GRAVITY: 1 (ref 1–1.03)
UROBILINOGEN,SEMI-QN: 0.2 MG/DL (ref 0–1.9)

## 2023-03-17 PROCEDURE — 3074F SYST BP LT 130 MM HG: CPT | Performed by: OBSTETRICS & GYNECOLOGY

## 2023-03-17 PROCEDURE — 3078F DIAST BP <80 MM HG: CPT | Performed by: OBSTETRICS & GYNECOLOGY

## 2023-03-17 PROCEDURE — 81002 URINALYSIS NONAUTO W/O SCOPE: CPT | Performed by: OBSTETRICS & GYNECOLOGY

## 2023-03-31 ENCOUNTER — ROUTINE PRENATAL (OUTPATIENT)
Dept: OBGYN CLINIC | Facility: CLINIC | Age: 38
End: 2023-03-31

## 2023-03-31 VITALS
WEIGHT: 237.63 LBS | SYSTOLIC BLOOD PRESSURE: 121 MMHG | DIASTOLIC BLOOD PRESSURE: 71 MMHG | BODY MASS INDEX: 37 KG/M2 | HEART RATE: 97 BPM

## 2023-03-31 DIAGNOSIS — Z34.93 ENCOUNTER FOR SUPERVISION OF NORMAL PREGNANCY IN THIRD TRIMESTER, UNSPECIFIED GRAVIDITY: Primary | ICD-10-CM

## 2023-03-31 LAB
APPEARANCE: CLEAR
BILIRUBIN: NEGATIVE
GLUCOSE (URINE DIPSTICK): NEGATIVE MG/DL
KETONES (URINE DIPSTICK): NEGATIVE MG/DL
LEUKOCYTES: NEGATIVE
MULTISTIX LOT#: NORMAL NUMERIC
NITRITE, URINE: NEGATIVE
OCCULT BLOOD: NEGATIVE
PH, URINE: 6 (ref 4.5–8)
SPECIFIC GRAVITY: 1.01 (ref 1–1.03)
URINE-COLOR: YELLOW
UROBILINOGEN,SEMI-QN: 0.2 MG/DL (ref 0–1.9)

## 2023-03-31 PROCEDURE — 81002 URINALYSIS NONAUTO W/O SCOPE: CPT | Performed by: OBSTETRICS & GYNECOLOGY

## 2023-03-31 PROCEDURE — 3078F DIAST BP <80 MM HG: CPT | Performed by: OBSTETRICS & GYNECOLOGY

## 2023-03-31 PROCEDURE — 3074F SYST BP LT 130 MM HG: CPT | Performed by: OBSTETRICS & GYNECOLOGY

## 2023-04-10 ENCOUNTER — HOSPITAL ENCOUNTER (OUTPATIENT)
Dept: PERINATAL CARE | Facility: HOSPITAL | Age: 38
Discharge: HOME | End: 2023-04-10
Attending: OBSTETRICS & GYNECOLOGY
Payer: COMMERCIAL

## 2023-04-10 ENCOUNTER — HOSPITAL ENCOUNTER (OUTPATIENT)
Dept: PERINATAL CARE | Facility: HOSPITAL | Age: 38
End: 2023-04-10
Attending: OBSTETRICS & GYNECOLOGY
Payer: COMMERCIAL

## 2023-04-10 VITALS
WEIGHT: 237 LBS | BODY MASS INDEX: 37 KG/M2 | SYSTOLIC BLOOD PRESSURE: 135 MMHG | HEART RATE: 92 BPM | DIASTOLIC BLOOD PRESSURE: 81 MMHG

## 2023-04-10 DIAGNOSIS — Z98.890 H/O LEEP: ICD-10-CM

## 2023-04-10 DIAGNOSIS — O09.523 MULTIGRAVIDA OF ADVANCED MATERNAL AGE IN THIRD TRIMESTER: Primary | ICD-10-CM

## 2023-04-10 DIAGNOSIS — O09.522 MULTIGRAVIDA OF ADVANCED MATERNAL AGE IN SECOND TRIMESTER: ICD-10-CM

## 2023-04-10 PROCEDURE — 76816 OB US FOLLOW-UP PER FETUS: CPT | Performed by: OBSTETRICS & GYNECOLOGY

## 2023-04-14 ENCOUNTER — TELEPHONE (OUTPATIENT)
Dept: OBGYN CLINIC | Facility: CLINIC | Age: 38
End: 2023-04-14

## 2023-04-14 ENCOUNTER — ROUTINE PRENATAL (OUTPATIENT)
Dept: OBGYN CLINIC | Facility: CLINIC | Age: 38
End: 2023-04-14

## 2023-04-14 VITALS
BODY MASS INDEX: 37 KG/M2 | SYSTOLIC BLOOD PRESSURE: 115 MMHG | HEART RATE: 101 BPM | WEIGHT: 238.38 LBS | DIASTOLIC BLOOD PRESSURE: 75 MMHG

## 2023-04-14 DIAGNOSIS — Z34.93 ENCOUNTER FOR SUPERVISION OF NORMAL PREGNANCY IN THIRD TRIMESTER, UNSPECIFIED GRAVIDITY: Primary | ICD-10-CM

## 2023-04-14 LAB
APPEARANCE: CLEAR
BILIRUBIN: NEGATIVE
GLUCOSE (URINE DIPSTICK): NEGATIVE MG/DL
KETONES (URINE DIPSTICK): 15 MG/DL
LEUKOCYTES: NEGATIVE
MULTISTIX LOT#: ABNORMAL NUMERIC
NITRITE, URINE: NEGATIVE
OCCULT BLOOD: NEGATIVE
PH, URINE: 6 (ref 4.5–8)
PROTEIN (URINE DIPSTICK): NEGATIVE MG/DL
SPECIFIC GRAVITY: 1 (ref 1–1.03)
URINE-COLOR: YELLOW
UROBILINOGEN,SEMI-QN: 0.2 MG/DL (ref 0–1.9)

## 2023-04-14 PROCEDURE — 3074F SYST BP LT 130 MM HG: CPT | Performed by: OBSTETRICS & GYNECOLOGY

## 2023-04-14 PROCEDURE — 3078F DIAST BP <80 MM HG: CPT | Performed by: OBSTETRICS & GYNECOLOGY

## 2023-04-14 PROCEDURE — 90471 IMMUNIZATION ADMIN: CPT | Performed by: OBSTETRICS & GYNECOLOGY

## 2023-04-14 PROCEDURE — 81002 URINALYSIS NONAUTO W/O SCOPE: CPT | Performed by: OBSTETRICS & GYNECOLOGY

## 2023-04-14 PROCEDURE — 90715 TDAP VACCINE 7 YRS/> IM: CPT | Performed by: OBSTETRICS & GYNECOLOGY

## 2023-04-14 NOTE — TELEPHONE ENCOUNTER
Advised pt to contact her insurance plan to ask on process for obtaining breast pump. Advised we can provide referral or rx and we can fax it. Pt agrees.

## 2023-04-28 ENCOUNTER — ROUTINE PRENATAL (OUTPATIENT)
Dept: OBGYN CLINIC | Facility: CLINIC | Age: 38
End: 2023-04-28

## 2023-04-28 ENCOUNTER — TELEPHONE (OUTPATIENT)
Dept: OBGYN CLINIC | Facility: CLINIC | Age: 38
End: 2023-04-28

## 2023-04-28 VITALS
WEIGHT: 240.81 LBS | DIASTOLIC BLOOD PRESSURE: 73 MMHG | BODY MASS INDEX: 38 KG/M2 | SYSTOLIC BLOOD PRESSURE: 116 MMHG | HEART RATE: 93 BPM

## 2023-04-28 DIAGNOSIS — Z34.93 ENCOUNTER FOR SUPERVISION OF NORMAL PREGNANCY IN THIRD TRIMESTER, UNSPECIFIED GRAVIDITY: Primary | ICD-10-CM

## 2023-04-28 LAB
APPEARANCE: CLEAR
BILIRUBIN: NEGATIVE
GLUCOSE (URINE DIPSTICK): NEGATIVE MG/DL
KETONES (URINE DIPSTICK): NEGATIVE MG/DL
LEUKOCYTES: NEGATIVE
MULTISTIX LOT#: NORMAL NUMERIC
NITRITE, URINE: NEGATIVE
OCCULT BLOOD: NEGATIVE
PH, URINE: 6 (ref 4.5–8)
PROTEIN (URINE DIPSTICK): NEGATIVE MG/DL
SPECIFIC GRAVITY: 1.01 (ref 1–1.03)
URINE-COLOR: YELLOW
UROBILINOGEN,SEMI-QN: 0.2 MG/DL (ref 0–1.9)

## 2023-04-28 PROCEDURE — 3074F SYST BP LT 130 MM HG: CPT | Performed by: OBSTETRICS & GYNECOLOGY

## 2023-04-28 PROCEDURE — 3078F DIAST BP <80 MM HG: CPT | Performed by: OBSTETRICS & GYNECOLOGY

## 2023-04-28 PROCEDURE — 81002 URINALYSIS NONAUTO W/O SCOPE: CPT | Performed by: OBSTETRICS & GYNECOLOGY

## 2023-04-28 RX ORDER — BREAST PUMP
EACH MISCELLANEOUS
Qty: 1 EACH | Refills: 0 | Status: SHIPPED | OUTPATIENT
Start: 2023-04-28

## 2023-05-08 ENCOUNTER — HOSPITAL ENCOUNTER (OUTPATIENT)
Dept: PERINATAL CARE | Facility: HOSPITAL | Age: 38
End: 2023-05-08
Attending: OBSTETRICS & GYNECOLOGY
Payer: COMMERCIAL

## 2023-05-08 DIAGNOSIS — O09.522 MULTIGRAVIDA OF ADVANCED MATERNAL AGE IN SECOND TRIMESTER: ICD-10-CM

## 2023-05-08 PROCEDURE — 59025 FETAL NON-STRESS TEST: CPT

## 2023-05-08 PROCEDURE — 59025 FETAL NON-STRESS TEST: CPT | Performed by: OBSTETRICS & GYNECOLOGY

## 2023-05-09 ENCOUNTER — NST DOCUMENTATION (OUTPATIENT)
Dept: OBGYN CLINIC | Facility: CLINIC | Age: 38
End: 2023-05-09

## 2023-05-09 NOTE — NST
Nonstress Test   Patient: Jessica Gentile    Gestation: 36w1d    Diagnosis from order:         NST:        2023   NST DOCUMENTATION   Variability 6-25 BPM   Accelerations Yes   Decelerations None   Baseline 135 BPM   Uterine Irritability No   Contractions Irregular   Acoustic Stimulator No   Nonstress Test Interpretation Reactive   Nonstress Test Second Interpretation Reactive   NST Completed by Shaun JEAN   Disposition Home    Testing Plan Weekly NST   Provider Notified Rubén Giordano DO            I agree with the above evaluation. NST completed. Rolando MERRITT  [address] [place] [address],   2023  8:18 AM

## 2023-05-11 ENCOUNTER — LAB ENCOUNTER (OUTPATIENT)
Dept: LAB | Age: 38
End: 2023-05-11
Attending: INTERNAL MEDICINE
Payer: COMMERCIAL

## 2023-05-11 DIAGNOSIS — Z34.93 ENCOUNTER FOR SUPERVISION OF NORMAL PREGNANCY IN THIRD TRIMESTER, UNSPECIFIED GRAVIDITY: ICD-10-CM

## 2023-05-11 LAB
DEPRECATED RDW RBC AUTO: 46 FL (ref 35.1–46.3)
ERYTHROCYTE [DISTWIDTH] IN BLOOD BY AUTOMATED COUNT: 13.7 % (ref 11–15)
HCT VFR BLD AUTO: 38.9 %
HGB BLD-MCNC: 13.1 G/DL
MCH RBC QN AUTO: 31.2 PG (ref 26–34)
MCHC RBC AUTO-ENTMCNC: 33.7 G/DL (ref 31–37)
MCV RBC AUTO: 92.6 FL
PLATELET # BLD AUTO: 209 10(3)UL (ref 150–450)
RBC # BLD AUTO: 4.2 X10(6)UL
WBC # BLD AUTO: 7.5 X10(3) UL (ref 4–11)

## 2023-05-11 PROCEDURE — 86780 TREPONEMA PALLIDUM: CPT

## 2023-05-11 PROCEDURE — 36415 COLL VENOUS BLD VENIPUNCTURE: CPT

## 2023-05-11 PROCEDURE — 85027 COMPLETE CBC AUTOMATED: CPT

## 2023-05-11 PROCEDURE — 87389 HIV-1 AG W/HIV-1&-2 AB AG IA: CPT

## 2023-05-12 ENCOUNTER — ROUTINE PRENATAL (OUTPATIENT)
Dept: OBGYN CLINIC | Facility: CLINIC | Age: 38
End: 2023-05-12

## 2023-05-12 VITALS
DIASTOLIC BLOOD PRESSURE: 83 MMHG | HEART RATE: 87 BPM | SYSTOLIC BLOOD PRESSURE: 123 MMHG | BODY MASS INDEX: 38 KG/M2 | WEIGHT: 240 LBS

## 2023-05-12 DIAGNOSIS — Z34.93 ENCOUNTER FOR SUPERVISION OF NORMAL PREGNANCY IN THIRD TRIMESTER, UNSPECIFIED GRAVIDITY: Primary | ICD-10-CM

## 2023-05-12 LAB
APPEARANCE: CLEAR
BILIRUBIN: NEGATIVE
GLUCOSE (URINE DIPSTICK): NEGATIVE MG/DL
KETONES (URINE DIPSTICK): NEGATIVE MG/DL
LEUKOCYTES: NEGATIVE
MULTISTIX LOT#: ABNORMAL NUMERIC
NITRITE, URINE: NEGATIVE
PH, URINE: 5 (ref 4.5–8)
PROTEIN (URINE DIPSTICK): NEGATIVE MG/DL
SPECIFIC GRAVITY: 1.02 (ref 1–1.03)
T PALLIDUM AB SER QL: NEGATIVE
URINE-COLOR: YELLOW
UROBILINOGEN,SEMI-QN: 0.2 MG/DL (ref 0–1.9)

## 2023-05-12 PROCEDURE — 3079F DIAST BP 80-89 MM HG: CPT | Performed by: OBSTETRICS & GYNECOLOGY

## 2023-05-12 PROCEDURE — 3074F SYST BP LT 130 MM HG: CPT | Performed by: OBSTETRICS & GYNECOLOGY

## 2023-05-12 PROCEDURE — 81002 URINALYSIS NONAUTO W/O SCOPE: CPT | Performed by: OBSTETRICS & GYNECOLOGY

## 2023-05-14 LAB — GROUP B STREP BY PCR FOR PCR OVT: POSITIVE

## 2023-05-15 ENCOUNTER — NST DOCUMENTATION (OUTPATIENT)
Dept: OBGYN CLINIC | Facility: CLINIC | Age: 38
End: 2023-05-15

## 2023-05-15 ENCOUNTER — HOSPITAL ENCOUNTER (OUTPATIENT)
Dept: PERINATAL CARE | Facility: HOSPITAL | Age: 38
End: 2023-05-15
Attending: OBSTETRICS & GYNECOLOGY
Payer: COMMERCIAL

## 2023-05-15 DIAGNOSIS — O09.522 MULTIGRAVIDA OF ADVANCED MATERNAL AGE IN SECOND TRIMESTER: ICD-10-CM

## 2023-05-15 PROCEDURE — 59025 FETAL NON-STRESS TEST: CPT

## 2023-05-15 NOTE — NST
Nonstress Test   Patient: Bethany Sousa    Gestation: 37w0d    Diagnosis from order:  AMA, obesity       NST:        5/15/2023   NST DOCUMENTATION   Variability 6-25 BPM   Decelerations None   Baseline 140 BPM   Uterine Irritability No   Contractions Not present   Acoustic Stimulator No   Nonstress Test Interpretation Reactive   NST Completed by Shaun JEAN   Disposition home    Testing Plan Weekly NST   Provider Zachery Redd MD            I agree with the above evaluation. NST completed.   Liss Choi MD  5/15/2023  4:34 PM

## 2023-05-15 NOTE — NST
Nonstress Test   Patient: Surekha Mcknight    Gestation: 37w0d    Diagnosis from order:  AMA       NST:        5/15/2023   NST DOCUMENTATION   Variability 6-25 BPM   Decelerations None   Baseline 140 BPM   Uterine Irritability No   Contractions Not present   Acoustic Stimulator No   Nonstress Test Interpretation Reactive   NST Completed by Shaun JEAN   Disposition home    Testing Plan Weekly NST   Provider John Allan MD            I agree with the above evaluation. NST completed.   Anahi Summers MD  5/15/2023  4:19 PM

## 2023-05-19 ENCOUNTER — ROUTINE PRENATAL (OUTPATIENT)
Dept: OBGYN CLINIC | Facility: CLINIC | Age: 38
End: 2023-05-19

## 2023-05-19 ENCOUNTER — PATIENT MESSAGE (OUTPATIENT)
Dept: OBGYN CLINIC | Facility: CLINIC | Age: 38
End: 2023-05-19

## 2023-05-19 VITALS
DIASTOLIC BLOOD PRESSURE: 76 MMHG | WEIGHT: 237 LBS | SYSTOLIC BLOOD PRESSURE: 112 MMHG | HEART RATE: 106 BPM | BODY MASS INDEX: 37 KG/M2

## 2023-05-19 DIAGNOSIS — Z34.03 ENCOUNTER FOR SUPERVISION OF NORMAL FIRST PREGNANCY IN THIRD TRIMESTER: Primary | ICD-10-CM

## 2023-05-19 LAB
APPEARANCE: CLEAR
GLUCOSE (URINE DIPSTICK): NEGATIVE MG/DL
KETONES (URINE DIPSTICK): NEGATIVE MG/DL
MULTISTIX LOT#: NORMAL NUMERIC
NITRITE, URINE: NEGATIVE
PROTEIN (URINE DIPSTICK): NEGATIVE MG/DL
URINE-COLOR: YELLOW

## 2023-05-19 PROCEDURE — 3078F DIAST BP <80 MM HG: CPT | Performed by: OBSTETRICS & GYNECOLOGY

## 2023-05-19 PROCEDURE — 81002 URINALYSIS NONAUTO W/O SCOPE: CPT | Performed by: OBSTETRICS & GYNECOLOGY

## 2023-05-19 PROCEDURE — 3074F SYST BP LT 130 MM HG: CPT | Performed by: OBSTETRICS & GYNECOLOGY

## 2023-05-19 NOTE — TELEPHONE ENCOUNTER
From: Paige Aquino  To: Frank Chou. DO Chu  Sent: 5/19/2023 10:21 AM CDT  Subject: Labor Instruction Sheet    Hello! I was at my appointment today and completely forgot to grab my labor instruction sheet on the way out. Would it be possible to have that sent to me? Or should I just wait for my next appointment in a week? Thank you very much! No

## 2023-05-22 ENCOUNTER — HOSPITAL ENCOUNTER (OUTPATIENT)
Dept: PERINATAL CARE | Facility: HOSPITAL | Age: 38
End: 2023-05-22
Attending: OBSTETRICS & GYNECOLOGY
Payer: COMMERCIAL

## 2023-05-22 ENCOUNTER — NST DOCUMENTATION (OUTPATIENT)
Dept: OBGYN CLINIC | Facility: CLINIC | Age: 38
End: 2023-05-22

## 2023-05-22 DIAGNOSIS — O09.522 MULTIGRAVIDA OF ADVANCED MATERNAL AGE IN SECOND TRIMESTER: Primary | ICD-10-CM

## 2023-05-22 PROCEDURE — 59025 FETAL NON-STRESS TEST: CPT

## 2023-05-22 NOTE — NST
Nonstress Test   Patient: Carmie Rubinstein    Gestation: 38w0d    Diagnosis from order:  AMA       NST:        2023   NST DOCUMENTATION   Variability 6-25 BPM   Accelerations Yes   Decelerations None   Baseline 125 BPM   Uterine Irritability No   Contractions Not present   Acoustic Stimulator No   Nonstress Test Interpretation Reactive   NST Completed by Shaun JEAN   Disposition Home    Testing Plan Weekly NST   Provider Notified Loraine Orozco MD            I agree with the above evaluation. NST completed.   Warden Hossein MD  2023  3:37 PM

## 2023-05-26 ENCOUNTER — ROUTINE PRENATAL (OUTPATIENT)
Dept: OBGYN CLINIC | Facility: CLINIC | Age: 38
End: 2023-05-26

## 2023-05-26 VITALS
WEIGHT: 239 LBS | HEART RATE: 80 BPM | BODY MASS INDEX: 37 KG/M2 | DIASTOLIC BLOOD PRESSURE: 70 MMHG | SYSTOLIC BLOOD PRESSURE: 121 MMHG

## 2023-05-26 DIAGNOSIS — Z34.83 ENCOUNTER FOR SUPERVISION OF OTHER NORMAL PREGNANCY IN THIRD TRIMESTER: Primary | ICD-10-CM

## 2023-05-26 PROCEDURE — 3078F DIAST BP <80 MM HG: CPT | Performed by: OBSTETRICS & GYNECOLOGY

## 2023-05-26 PROCEDURE — 81002 URINALYSIS NONAUTO W/O SCOPE: CPT | Performed by: OBSTETRICS & GYNECOLOGY

## 2023-05-26 PROCEDURE — 3074F SYST BP LT 130 MM HG: CPT | Performed by: OBSTETRICS & GYNECOLOGY

## 2023-05-28 ENCOUNTER — HOSPITAL ENCOUNTER (INPATIENT)
Facility: HOSPITAL | Age: 38
LOS: 1 days | Discharge: HOME | End: 2023-05-29
Attending: OBSTETRICS & GYNECOLOGY | Admitting: OBSTETRICS & GYNECOLOGY
Payer: COMMERCIAL

## 2023-05-28 ENCOUNTER — TELEPHONE (OUTPATIENT)
Dept: OBGYN CLINIC | Facility: CLINIC | Age: 38
End: 2023-05-28

## 2023-05-28 PROBLEM — Z34.90 PREGNANCY: Status: ACTIVE | Noted: 2023-05-28

## 2023-05-28 PROBLEM — O42.92 FULL-TERM PREM ROM, UNSP TIME BETW RUPTURE AND ONSET LABOR: Status: ACTIVE | Noted: 2023-05-28

## 2023-05-28 PROBLEM — Z34.90 PREGNANCY (HCC): Status: ACTIVE | Noted: 2023-05-28

## 2023-05-28 PROBLEM — O42.92: Status: ACTIVE | Noted: 2023-05-28

## 2023-05-28 LAB
ANTIBODY SCREEN: NEGATIVE
BASOPHILS # BLD AUTO: 0.03 X10(3) UL (ref 0–0.2)
BASOPHILS NFR BLD AUTO: 0.4 %
DEPRECATED RDW RBC AUTO: 46.6 FL (ref 35.1–46.3)
EOSINOPHIL # BLD AUTO: 0.03 X10(3) UL (ref 0–0.7)
EOSINOPHIL NFR BLD AUTO: 0.4 %
ERYTHROCYTE [DISTWIDTH] IN BLOOD BY AUTOMATED COUNT: 13.7 % (ref 11–15)
HCT VFR BLD AUTO: 38.3 %
HGB BLD-MCNC: 13 G/DL
IMM GRANULOCYTES # BLD AUTO: 0.04 X10(3) UL (ref 0–1)
IMM GRANULOCYTES NFR BLD: 0.5 %
LYMPHOCYTES # BLD AUTO: 2.26 X10(3) UL (ref 1–4)
LYMPHOCYTES NFR BLD AUTO: 29.4 %
MCH RBC QN AUTO: 31.3 PG (ref 26–34)
MCHC RBC AUTO-ENTMCNC: 33.9 G/DL (ref 31–37)
MCV RBC AUTO: 92.3 FL
MONOCYTES # BLD AUTO: 0.65 X10(3) UL (ref 0.1–1)
MONOCYTES NFR BLD AUTO: 8.5 %
NEUTROPHILS # BLD AUTO: 4.67 X10 (3) UL (ref 1.5–7.7)
NEUTROPHILS # BLD AUTO: 4.67 X10(3) UL (ref 1.5–7.7)
NEUTROPHILS NFR BLD AUTO: 60.8 %
PLATELET # BLD AUTO: 196 10(3)UL (ref 150–450)
RBC # BLD AUTO: 4.15 X10(6)UL
RH BLOOD TYPE: POSITIVE
SARS-COV-2 RNA RESP QL NAA+PROBE: NOT DETECTED
WBC # BLD AUTO: 7.7 X10(3) UL (ref 4–11)

## 2023-05-28 PROCEDURE — 0HQ9XZZ REPAIR PERINEUM SKIN, EXTERNAL APPROACH: ICD-10-PCS | Performed by: OBSTETRICS & GYNECOLOGY

## 2023-05-28 PROCEDURE — 59400 OBSTETRICAL CARE: CPT | Performed by: OBSTETRICS & GYNECOLOGY

## 2023-05-28 RX ORDER — SIMETHICONE 80 MG
80 TABLET,CHEWABLE ORAL 3 TIMES DAILY PRN
Status: DISCONTINUED | OUTPATIENT
Start: 2023-05-28 | End: 2023-05-29

## 2023-05-28 RX ORDER — IBUPROFEN 600 MG/1
600 TABLET ORAL EVERY 6 HOURS
Status: DISCONTINUED | OUTPATIENT
Start: 2023-05-28 | End: 2023-05-29

## 2023-05-28 RX ORDER — DEXTROSE, SODIUM CHLORIDE, SODIUM LACTATE, POTASSIUM CHLORIDE, AND CALCIUM CHLORIDE 5; .6; .31; .03; .02 G/100ML; G/100ML; G/100ML; G/100ML; G/100ML
INJECTION, SOLUTION INTRAVENOUS CONTINUOUS
Status: DISCONTINUED | OUTPATIENT
Start: 2023-05-28 | End: 2023-05-28 | Stop reason: HOSPADM

## 2023-05-28 RX ORDER — DOCUSATE SODIUM 100 MG/1
100 CAPSULE, LIQUID FILLED ORAL
Status: DISCONTINUED | OUTPATIENT
Start: 2023-05-28 | End: 2023-05-29

## 2023-05-28 RX ORDER — ACETAMINOPHEN 500 MG
1000 TABLET ORAL EVERY 6 HOURS PRN
Status: DISCONTINUED | OUTPATIENT
Start: 2023-05-28 | End: 2023-05-28 | Stop reason: HOSPADM

## 2023-05-28 RX ORDER — TERBUTALINE SULFATE 1 MG/ML
0.25 INJECTION, SOLUTION SUBCUTANEOUS AS NEEDED
Status: DISCONTINUED | OUTPATIENT
Start: 2023-05-28 | End: 2023-05-28 | Stop reason: HOSPADM

## 2023-05-28 RX ORDER — ACETAMINOPHEN 500 MG
500 TABLET ORAL EVERY 6 HOURS PRN
Status: DISCONTINUED | OUTPATIENT
Start: 2023-05-28 | End: 2023-05-29

## 2023-05-28 RX ORDER — IBUPROFEN 600 MG/1
600 TABLET ORAL ONCE AS NEEDED
Status: DISCONTINUED | OUTPATIENT
Start: 2023-05-28 | End: 2023-05-28 | Stop reason: HOSPADM

## 2023-05-28 RX ORDER — DIAPER,BRIEF,INFANT-TODD,DISP
1 EACH MISCELLANEOUS EVERY 6 HOURS PRN
Status: DISCONTINUED | OUTPATIENT
Start: 2023-05-28 | End: 2023-05-29

## 2023-05-28 RX ORDER — BISACODYL 10 MG
10 SUPPOSITORY, RECTAL RECTAL ONCE AS NEEDED
Status: DISCONTINUED | OUTPATIENT
Start: 2023-05-28 | End: 2023-05-29

## 2023-05-28 RX ORDER — SODIUM CHLORIDE, SODIUM LACTATE, POTASSIUM CHLORIDE, CALCIUM CHLORIDE 600; 310; 30; 20 MG/100ML; MG/100ML; MG/100ML; MG/100ML
INJECTION, SOLUTION INTRAVENOUS AS NEEDED
Status: DISCONTINUED | OUTPATIENT
Start: 2023-05-28 | End: 2023-05-28 | Stop reason: HOSPADM

## 2023-05-28 RX ORDER — AMMONIA INHALANTS 0.04 G/.3ML
0.3 INHALANT RESPIRATORY (INHALATION) AS NEEDED
Status: DISCONTINUED | OUTPATIENT
Start: 2023-05-28 | End: 2023-05-29

## 2023-05-28 RX ORDER — ACETAMINOPHEN 500 MG
500 TABLET ORAL EVERY 6 HOURS PRN
Status: DISCONTINUED | OUTPATIENT
Start: 2023-05-28 | End: 2023-05-28 | Stop reason: HOSPADM

## 2023-05-28 RX ORDER — ONDANSETRON 2 MG/ML
4 INJECTION INTRAMUSCULAR; INTRAVENOUS EVERY 6 HOURS PRN
Status: DISCONTINUED | OUTPATIENT
Start: 2023-05-28 | End: 2023-05-28 | Stop reason: HOSPADM

## 2023-05-28 RX ORDER — ACETAMINOPHEN 500 MG
1000 TABLET ORAL EVERY 6 HOURS PRN
Status: DISCONTINUED | OUTPATIENT
Start: 2023-05-28 | End: 2023-05-29

## 2023-05-28 RX ORDER — LIDOCAINE HYDROCHLORIDE 10 MG/ML
30 INJECTION, SOLUTION EPIDURAL; INFILTRATION; INTRACAUDAL; PERINEURAL ONCE
Status: DISCONTINUED | OUTPATIENT
Start: 2023-05-28 | End: 2023-05-28 | Stop reason: HOSPADM

## 2023-05-28 RX ORDER — ONDANSETRON 2 MG/ML
4 INJECTION INTRAMUSCULAR; INTRAVENOUS EVERY 6 HOURS PRN
Status: DISCONTINUED | OUTPATIENT
Start: 2023-05-28 | End: 2023-05-29

## 2023-05-28 RX ORDER — TRISODIUM CITRATE DIHYDRATE AND CITRIC ACID MONOHYDRATE 500; 334 MG/5ML; MG/5ML
30 SOLUTION ORAL AS NEEDED
Status: DISCONTINUED | OUTPATIENT
Start: 2023-05-28 | End: 2023-05-28 | Stop reason: HOSPADM

## 2023-05-28 NOTE — TELEPHONE ENCOUNTER
Received a call from Adelina Fowler as the on call physician. Pt reports that she had a gush of fluid. Denies contractions. Discussed recommendation to proceed to [facility] and patient states that she will be on her way.

## 2023-05-28 NOTE — PROGRESS NOTES
Received patient into room  368 via wheelchair . Bedside shift report received from oZra. Pt transferred to bed. Bed in lowest and locked position. Side rails up x2. VSS. IV site unremarkable. Baby present in open crib. ID bands matched with L&D RN. Patient and family oriented to unit, room and call light within reach. Safety measures in place, POC followed. Will continue to monitor per protocol. Advised to call for assistance to bathroom.

## 2023-05-28 NOTE — PROGRESS NOTES
Patient up to bathroom with assist x 2. Unable to void at this time, straight cath 500cc. Patient transferred to mother/baby room 368 per wheelchair in stable condition with baby and personal belongings. Accompanied by significant other and staff. Report given to mother/baby RN Serena Marquez.

## 2023-05-28 NOTE — PROGRESS NOTES
Pt is a 40year old female admitted to [place] [address]. Patient presents with:  R/o Rom: Pt states her water broke at midnight     Pt is [id number] 38w6d intra-uterine pregnancy. History obtained, consents signed. Oriented to room, staff, and plan of care.

## 2023-05-28 NOTE — L&D DELIVERY NOTE
[facility]    Vaginal Delivery Note    Josue Palacio Patient Status:  Outpatient    1985 MRN [id number]   Location [address] Attending Alok Blankenship MD   Hosp Day # 0 PCP Rita Huang. Char Brown MD     Delivery     Infant Info:  Date of Delivery: 2023, Time of Delivery: 12:29 PM, Delivery Type: Normal spontaneous vaginal delivery    Information for the patient's : Rodrigo Agustin, Girl [[id number]]   No birth weight on file. 7# 11 oz    Apgars:    1 minute:    8             5 minutes:   9                      10 minutes:       Delivery Narrative: Patient pushed for less than 5 minutes prior to delivering a live female infant over intact perineum in DEVEN position. Loose nuchal cord reduced Infant was bulb suctioned prior to delivering in toto. Cord doubly clamped & cut after 30 seconds. Infant handed to awaiting mother. First degree midline perineal laceration repaired with 3-0 Chromic in normal usual fashion. No sulcus, periuretheral, nor cervical lacerations noted. Placenta delivered spontaneously, intact and normal in appearance with 3 vessel cord. Mother in stable condition.     Maternal Anesthesia: local     Delivery Complications:  none    Neonatologist Present: no    Placenta info:  Date/Time of Delivery: 2023 12:34 PM   Delivery: spontaneous  Placenta to Pathology: yes  -- velamentous cord insertion noted    Cord info:  Cord Gases Submitted: no  Cord Blood Collection: no  Cord Tissue Collection: no  Cord Complications: single nuchal    Sponge and Needle Counts:  Verified    Quantitative Blood Loss (mL)   375cc      Bright Geronimo MD   2023  12:49 PM

## 2023-05-29 VITALS
BODY MASS INDEX: 37.51 KG/M2 | SYSTOLIC BLOOD PRESSURE: 119 MMHG | HEIGHT: 67 IN | OXYGEN SATURATION: 97 % | RESPIRATION RATE: 16 BRPM | DIASTOLIC BLOOD PRESSURE: 82 MMHG | TEMPERATURE: 98 F | WEIGHT: 239 LBS | HEART RATE: 72 BPM

## 2023-05-29 LAB
BASOPHILS # BLD AUTO: 0.03 X10(3) UL (ref 0–0.2)
BASOPHILS NFR BLD AUTO: 0.3 %
DEPRECATED RDW RBC AUTO: 48 FL (ref 35.1–46.3)
EOSINOPHIL # BLD AUTO: 0.04 X10(3) UL (ref 0–0.7)
EOSINOPHIL NFR BLD AUTO: 0.4 %
ERYTHROCYTE [DISTWIDTH] IN BLOOD BY AUTOMATED COUNT: 14 % (ref 11–15)
HCT VFR BLD AUTO: 35.6 %
HGB BLD-MCNC: 11.8 G/DL
IMM GRANULOCYTES # BLD AUTO: 0.05 X10(3) UL (ref 0–1)
IMM GRANULOCYTES NFR BLD: 0.5 %
LYMPHOCYTES # BLD AUTO: 2.4 X10(3) UL (ref 1–4)
LYMPHOCYTES NFR BLD AUTO: 22.7 %
MCH RBC QN AUTO: 31.1 PG (ref 26–34)
MCHC RBC AUTO-ENTMCNC: 33.1 G/DL (ref 31–37)
MCV RBC AUTO: 93.9 FL
MONOCYTES # BLD AUTO: 0.95 X10(3) UL (ref 0.1–1)
MONOCYTES NFR BLD AUTO: 9 %
NEUTROPHILS # BLD AUTO: 7.12 X10 (3) UL (ref 1.5–7.7)
NEUTROPHILS # BLD AUTO: 7.12 X10(3) UL (ref 1.5–7.7)
NEUTROPHILS NFR BLD AUTO: 67.1 %
PLATELET # BLD AUTO: 177 10(3)UL (ref 150–450)
RBC # BLD AUTO: 3.79 X10(6)UL
WBC # BLD AUTO: 10.6 X10(3) UL (ref 4–11)

## 2023-05-31 ENCOUNTER — PATIENT OUTREACH (OUTPATIENT)
Dept: CASE MANAGEMENT | Age: 38
End: 2023-05-31

## 2023-05-31 NOTE — PROGRESS NOTES
1st attempt OBGYN Post Partum apt request  (delivered 05/28)    Dr Sade Doty  [address],[facility] [address]  [facility]  [phone]  Follow up 6 weeks  Apt made:   Mon 07/10 @10:50am   Confirmed w/pt  Closing encounter

## 2023-07-05 ENCOUNTER — OFFICE VISIT (OUTPATIENT)
Dept: FAMILY MEDICINE CLINIC | Facility: CLINIC | Age: 38
End: 2023-07-05

## 2023-07-05 VITALS
RESPIRATION RATE: 16 BRPM | WEIGHT: 220.5 LBS | HEIGHT: 67 IN | DIASTOLIC BLOOD PRESSURE: 80 MMHG | BODY MASS INDEX: 34.61 KG/M2 | HEART RATE: 62 BPM | SYSTOLIC BLOOD PRESSURE: 114 MMHG

## 2023-07-05 DIAGNOSIS — E04.1 THYROID CYST: ICD-10-CM

## 2023-07-05 DIAGNOSIS — Z00.00 WELLNESS EXAMINATION: Primary | ICD-10-CM

## 2023-07-05 DIAGNOSIS — Z13.29 THYROID DISORDER SCREEN: ICD-10-CM

## 2023-07-05 DIAGNOSIS — Z13.0 SCREENING FOR DEFICIENCY ANEMIA: ICD-10-CM

## 2023-07-05 DIAGNOSIS — Z13.220 LIPID SCREENING: ICD-10-CM

## 2023-07-05 PROCEDURE — 3074F SYST BP LT 130 MM HG: CPT | Performed by: FAMILY MEDICINE

## 2023-07-05 PROCEDURE — 3079F DIAST BP 80-89 MM HG: CPT | Performed by: FAMILY MEDICINE

## 2023-07-05 PROCEDURE — 3008F BODY MASS INDEX DOCD: CPT | Performed by: FAMILY MEDICINE

## 2023-07-05 PROCEDURE — 99395 PREV VISIT EST AGE 18-39: CPT | Performed by: FAMILY MEDICINE

## 2023-07-06 ENCOUNTER — TELEPHONE (OUTPATIENT)
Dept: OBGYN UNIT | Facility: HOSPITAL | Age: 38
End: 2023-07-06

## 2023-07-10 ENCOUNTER — POSTPARTUM (OUTPATIENT)
Dept: OBGYN CLINIC | Facility: CLINIC | Age: 38
End: 2023-07-10

## 2023-07-10 VITALS
BODY MASS INDEX: 34 KG/M2 | DIASTOLIC BLOOD PRESSURE: 73 MMHG | SYSTOLIC BLOOD PRESSURE: 105 MMHG | HEART RATE: 84 BPM | WEIGHT: 218 LBS

## 2023-07-10 PROBLEM — O09.522 MULTIGRAVIDA OF ADVANCED MATERNAL AGE IN SECOND TRIMESTER (HCC): Status: RESOLVED | Noted: 2022-11-02 | Resolved: 2023-07-10

## 2023-07-10 PROBLEM — O09.522 MULTIGRAVIDA OF ADVANCED MATERNAL AGE IN SECOND TRIMESTER: Status: RESOLVED | Noted: 2022-11-02 | Resolved: 2023-07-10

## 2023-07-10 PROBLEM — Z98.890 H/O LEEP: Status: RESOLVED | Noted: 2017-03-20 | Resolved: 2023-07-10

## 2023-07-10 PROBLEM — O42.92 FULL-TERM PREM ROM, UNSP TIME BETW RUPTURE AND ONSET LABOR: Status: RESOLVED | Noted: 2023-05-28 | Resolved: 2023-07-10

## 2023-07-10 PROBLEM — Z34.90 PREGNANCY (HCC): Status: RESOLVED | Noted: 2023-05-28 | Resolved: 2023-07-10

## 2023-07-10 PROBLEM — O42.92: Status: RESOLVED | Noted: 2023-05-28 | Resolved: 2023-07-10

## 2023-07-10 PROBLEM — Z34.90 PREGNANCY: Status: RESOLVED | Noted: 2023-05-28 | Resolved: 2023-07-10

## 2023-07-10 PROCEDURE — 3078F DIAST BP <80 MM HG: CPT | Performed by: OBSTETRICS & GYNECOLOGY

## 2023-07-10 PROCEDURE — 3074F SYST BP LT 130 MM HG: CPT | Performed by: OBSTETRICS & GYNECOLOGY

## 2023-09-05 ENCOUNTER — OFFICE VISIT (OUTPATIENT)
Dept: FAMILY MEDICINE CLINIC | Facility: CLINIC | Age: 38
End: 2023-09-05

## 2023-09-05 VITALS
DIASTOLIC BLOOD PRESSURE: 79 MMHG | SYSTOLIC BLOOD PRESSURE: 119 MMHG | BODY MASS INDEX: 34.37 KG/M2 | HEIGHT: 67 IN | WEIGHT: 219 LBS | HEART RATE: 61 BPM

## 2023-09-05 DIAGNOSIS — R35.0 URINARY FREQUENCY: ICD-10-CM

## 2023-09-05 DIAGNOSIS — S39.012A STRAIN OF LUMBAR REGION, INITIAL ENCOUNTER: Primary | ICD-10-CM

## 2023-09-05 LAB
APPEARANCE: CLEAR
BILIRUBIN: NEGATIVE
GLUCOSE (URINE DIPSTICK): NEGATIVE MG/DL
KETONES (URINE DIPSTICK): NEGATIVE MG/DL
NITRITE, URINE: NEGATIVE
PH, URINE: 5.5 (ref 4.5–8)
PROTEIN (URINE DIPSTICK): NEGATIVE MG/DL
SPECIFIC GRAVITY: 1.01 (ref 1–1.03)
URINE-COLOR: ABNORMAL
UROBILINOGEN,SEMI-QN: 0.2 MG/DL (ref 0–1.9)

## 2023-09-05 PROCEDURE — 3078F DIAST BP <80 MM HG: CPT | Performed by: PHYSICIAN ASSISTANT

## 2023-09-05 PROCEDURE — 3008F BODY MASS INDEX DOCD: CPT | Performed by: PHYSICIAN ASSISTANT

## 2023-09-05 PROCEDURE — 81002 URINALYSIS NONAUTO W/O SCOPE: CPT | Performed by: PHYSICIAN ASSISTANT

## 2023-09-05 PROCEDURE — 99213 OFFICE O/P EST LOW 20 MIN: CPT | Performed by: PHYSICIAN ASSISTANT

## 2023-09-05 PROCEDURE — 3074F SYST BP LT 130 MM HG: CPT | Performed by: PHYSICIAN ASSISTANT

## 2023-09-05 RX ORDER — ONDANSETRON 4 MG/1
4 TABLET, FILM COATED ORAL EVERY 8 HOURS PRN
Qty: 20 TABLET | Refills: 0 | Status: SHIPPED | OUTPATIENT
Start: 2023-09-05

## 2023-10-03 ENCOUNTER — OFFICE VISIT (OUTPATIENT)
Dept: FAMILY MEDICINE CLINIC | Facility: CLINIC | Age: 38
End: 2023-10-03

## 2023-10-03 VITALS
RESPIRATION RATE: 18 BRPM | HEART RATE: 60 BPM | HEIGHT: 67 IN | WEIGHT: 217 LBS | BODY MASS INDEX: 34.06 KG/M2 | SYSTOLIC BLOOD PRESSURE: 118 MMHG | DIASTOLIC BLOOD PRESSURE: 81 MMHG

## 2023-10-03 DIAGNOSIS — R12 HEARTBURN SYMPTOM: Primary | ICD-10-CM

## 2023-10-03 PROCEDURE — 99213 OFFICE O/P EST LOW 20 MIN: CPT | Performed by: FAMILY MEDICINE

## 2023-10-03 PROCEDURE — 3074F SYST BP LT 130 MM HG: CPT | Performed by: FAMILY MEDICINE

## 2023-10-03 PROCEDURE — 3008F BODY MASS INDEX DOCD: CPT | Performed by: FAMILY MEDICINE

## 2023-10-03 PROCEDURE — 3079F DIAST BP 80-89 MM HG: CPT | Performed by: FAMILY MEDICINE

## 2023-10-03 RX ORDER — ONDANSETRON 4 MG/1
4 TABLET, FILM COATED ORAL EVERY 8 HOURS PRN
Qty: 20 TABLET | Refills: 1 | Status: SHIPPED | OUTPATIENT
Start: 2023-10-03

## 2023-10-25 ENCOUNTER — OFFICE VISIT (OUTPATIENT)
Dept: DERMATOLOGY CLINIC | Facility: CLINIC | Age: 38
End: 2023-10-25
Payer: COMMERCIAL

## 2023-10-25 DIAGNOSIS — D18.01 HEMANGIOMA OF SKIN AND SUBCUTANEOUS TISSUE: ICD-10-CM

## 2023-10-25 DIAGNOSIS — Z87.2 HISTORY OF SUN-DAMAGED SKIN: ICD-10-CM

## 2023-10-25 DIAGNOSIS — L82.1 SK (SEBORRHEIC KERATOSIS): Primary | ICD-10-CM

## 2023-10-25 DIAGNOSIS — L81.4 LENTIGO: ICD-10-CM

## 2023-10-25 DIAGNOSIS — D23.9 BENIGN NEOPLASM OF SKIN, UNSPECIFIED LOCATION: ICD-10-CM

## 2023-10-25 DIAGNOSIS — D22.9 MULTIPLE BENIGN NEVI: ICD-10-CM

## 2023-10-25 PROCEDURE — 99213 OFFICE O/P EST LOW 20 MIN: CPT | Performed by: DERMATOLOGY

## 2023-10-30 ENCOUNTER — TELEPHONE (OUTPATIENT)
Dept: OBGYN CLINIC | Facility: CLINIC | Age: 38
End: 2023-10-30

## 2023-10-30 NOTE — TELEPHONE ENCOUNTER
Pt is currently breast feeding. Pt was summoned for jury duty and wanted to be excused due to her breast feeding. Wanted to know if documentation could be submitted to support that. Can be uploaded to [employer]. Please advise.

## 2023-10-30 NOTE — TELEPHONE ENCOUNTER
Pt called states she is exclusively breast feeding. Requesting a note to indicate so she can send it in to be excused from jury duty. Pt informed will send letter. Pt states understanding.

## 2023-11-06 NOTE — PROGRESS NOTES
Yadi Devries is a 45year old female. HPI:     CC:  Patient presents with:  Full Skin Exam: LOV 22. Notes two (2) areas of concern on left breast, present for 5-6 months; denies bleeding or itching or pain. Requests full skin exam         Allergies:  Patient has no known allergies.     HISTORY:    Past Medical History:   Diagnosis Date    Decorative tattoo     HPV in female     Thyroid cyst     goiter    Varicella     Had chicken pox during childhood      Past Surgical History:   Procedure Laterality Date    COLPOSCOPY, CERVIX W/UPPER ADJACENT VAGINA; W/BIOPSY(S), CERVIX      LEEP        Family History   Problem Relation Age of Onset    Breast Cancer Paternal Aunt 61        unsure of age    Prostate Cancer Father       Social History     Socioeconomic History    Marital status:    Tobacco Use    Smoking status: Former     Packs/day: 0.50     Years: 15.00     Additional pack years: 0.00     Total pack years: 7.50     Types: Cigarettes     Quit date: 3/17/2017     Years since quittin.6    Smokeless tobacco: Never   Vaping Use    Vaping Use: Never used   Substance and Sexual Activity    Alcohol use: Not Currently     Comment: SOCIALLY    Drug use: No    Sexual activity: Yes     Partners: Male   Other Topics Concern    Caffeine Concern Yes     Comment: Coffee 1 cup daily    Pt has a pacemaker No    Pt has a defibrillator No    Reaction to local anesthetic No   Social Determinants of Health  Financial Resource Strain: Low Risk  (2023)      Financial Resource Strain          Difficulty of Paying Living Expenses: Not hard at all          Med Affordability: No  Food Insecurity: No Food Insecurity (2023)      Food Insecurity          Food Insecurity: Never true  Transportation Needs: No Transportation Needs (2023)      Transportation Needs          Lack of Transportation: No  Stress: No Stress Concern Present (2023)      Stress          Feeling of Stress : No  Housing Stability: Low Risk  (2023)      Housing Stability          Housing Instability: No       Current Outpatient Medications   Medication Sig Dispense Refill    Esomeprazole Magnesium (NEXIUM) 20 MG Oral Capsule Delayed Release Start 1 tablet fasting every morning and 1 tablet nightly for 1 month, continue 1 tablet fasting for 2 months 120 capsule 0    Misc. Devices (BREAST PUMP) Does not apply Misc Double Electric breast Pump () 1 each 0    prenatal vitamin with DHA 27-0.8-228 MG Oral Cap Take 1 capsule by mouth daily. Cholecalciferol (VITAMIN D) 1000 units Oral Tab Take 1,000 Units by mouth daily. ondansetron (ZOFRAN) 4 mg tablet Take 1 tablet (4 mg total) by mouth every 8 (eight) hours as needed for Nausea.  20 tablet 1     Allergies:   No Known Allergies    Past Medical History:   Diagnosis Date    Decorative tattoo     HPV in female     Thyroid cyst     goiter    Varicella     Had chicken pox during childhood     Past Surgical History:   Procedure Laterality Date    COLPOSCOPY, CERVIX W/UPPER ADJACENT VAGINA; W/BIOPSY(S), CERVIX      LEEP       Social History    Socioeconomic History      Marital status:       Spouse name: Not on file      Number of children: Not on file      Years of education: Not on file      Highest education level: Not on file    Occupational History      Not on file    Tobacco Use      Smoking status: Former        Packs/day: 0.50        Years: 15.00        Additional pack years: 0.00        Total pack years: 7.50        Types: Cigarettes        Quit date: 3/17/2017        Years since quittin.6      Smokeless tobacco: Never    Vaping Use      Vaping Use: Never used    Substance and Sexual Activity      Alcohol use: Not Currently        Comment: SOCIALLY      Drug use: No      Sexual activity: Yes        Partners: Male    Other Topics      Concerns:         Service: Not Asked        Blood Transfusions: Not Asked        Caffeine Concern: Yes          Coffee 1 cup daily        Occupational Exposure: Not Asked        Hobby Hazards: Not Asked        Sleep Concern: Not Asked        Stress Concern: Not Asked        Weight Concern: Not Asked        Special Diet: Not Asked        Back Care: Not Asked        Exercise: Not Asked        Bike Helmet: Not Asked        Seat Belt: Not Asked        Self-Exams: Not Asked        Grew up on a farm: Not Asked        History of tanning: Not Asked        Outdoor occupation: Not Asked        Pt has a pacemaker: No        Pt has a defibrillator: No        Breast feeding: Not Asked        Reaction to local anesthetic: No    Social History Narrative      Not on file    Social Determinants of Health  Financial Resource Strain: Low Risk  (5/28/2023)      Financial Resource Strain          Difficulty of Paying Living Expenses: Not hard at all          Med Affordability: No  Food Insecurity: No Food Insecurity (5/28/2023)      Food Insecurity          Food Insecurity: Never true  Transportation Needs: No Transportation Needs (5/28/2023)      Transportation Needs          Lack of Transportation: No  Stress: No Stress Concern Present (5/28/2023)      Stress          Feeling of Stress : No  Housing Stability: Low Risk  (5/28/2023)      Housing Stability          Housing Instability: No          Housing Instability Emergency: Not on file  Family History   Problem Relation Age of Onset    Breast Cancer Paternal Aunt 61        unsure of age    Prostate Cancer Father        There were no vitals filed for this visit. HPI:  Patient presents with:  Full Skin Exam: LOV 8/18/22. Notes two (2) areas of concern on left breast, present for 5-6 months; denies bleeding or itching or pain. Requests full skin exam       routine follow-up concerned above  Personal history of skin cancer: No  Personal history of AK's: No  Personal history of dysplastic nevi: No  Family history of skin cancer: No      Patient presents with concerns above.     Patient has been in their usual state of health. Past notes/ records and appropriate/relevant lab results including pathology and past body maps reviewed. Including outside notes/ PCP notes as appropriate. Updated and new information noted in current visit. ROS:  Denies other relevant systemic complaints. History, medications, allergies reviewed as noted. Physical Examination:     Well-developed well-nourished patient alert oriented in no acute distress. Exam performed, including scalp, head, neck, face,nails, hair, external eyes, including conjunctival mucosa, eyelids, lips external ears , arms, digits,palms. Multiple light to medium brown, well marginated, uniformly pigmented, macules and papules 6 mm and less scattered on exam. pigmented lesions examined with dermoscopy benign-appearing patterns. Waxy tannish keratotic papules scattered, cherry-red vascular papules scattered. See map today's date for lesions noted . See assessment and plan below for specific lesions. Otherwise remarkable for lesions as noted on map. See A/P  below for additional information:    Assessment / plan:    No orders of the defined types were placed in this encounter. Meds & Refills for this Visit:  Requested Prescriptions      No prescriptions requested or ordered in this encounter         Sk (seborrheic keratosis)  (primary encounter diagnosis)  Lentigo  Benign neoplasm of skin, unspecified location  Multiple benign nevi  Hemangioma of skin and subcutaneous tissue  History of sun-damaged skin      Lesion left breast waxy tan papule consistent benign seborrheic keratosis,     small cyst at inframammary crease, monitor presently consider referral for excision if cyst size become larger Patient presently nursing has 11month-old, older children 6 and 3  scattered nevi over the back, benign-appearing  Mild parietal scalp intradermal nevus no atypia 8/22    Numerous benign-appearing nevi  No atypical appearing lesions. Scattered nevi. Waxy tan papule right curricular cheek observe. Papule left jawline observe. Areas of idiopathic guttate hypomelanosis over the forearms reassurance. Moderate sun damage. Continue sunscreen sun protection    Please refer to map for specific lesions. See additional diagnoses. Pros cons of various therapies, risks benefits discussed. Pathophysiology discussed with patient. Therapeutic options reviewed. See  Medications in grid. Instructions reviewed at length. Benign nevi, seborrheic  keratoses, cherry angiomas:  Reassurance regarding other benign skin lesions. Signs and symptoms of skin cancer, ABCDE's of melanoma discussed with patient. Sunscreen use, sun protection, self exams reviewed. Followup as noted RTC ---routine checkup   -one year or p.r.n. Encounter Times   Including precharting, reviewing chart, prior notes obtaining history: 10 minutes, medical exam :10 minutes, notes on body map, plan, counseling 10minutes My total time spent caring for the patient on the day of the encounter: 30 minutes     The patient indicates understanding of these issues and agrees to the plan. The patient is asked to return as noted in follow-up/ above. This note was generated using Dragon voice recognition software. Please contact me regarding any confusion resulting from errors in recognition. Gisell Ibrahim

## 2023-11-07 ENCOUNTER — LAB ENCOUNTER (OUTPATIENT)
Dept: LAB | Age: 38
End: 2023-11-07
Attending: FAMILY MEDICINE
Payer: COMMERCIAL

## 2023-11-07 ENCOUNTER — OFFICE VISIT (OUTPATIENT)
Dept: FAMILY MEDICINE CLINIC | Facility: CLINIC | Age: 38
End: 2023-11-07
Payer: COMMERCIAL

## 2023-11-07 VITALS
DIASTOLIC BLOOD PRESSURE: 72 MMHG | SYSTOLIC BLOOD PRESSURE: 121 MMHG | HEIGHT: 67 IN | WEIGHT: 215 LBS | HEART RATE: 64 BPM | BODY MASS INDEX: 33.74 KG/M2 | RESPIRATION RATE: 16 BRPM

## 2023-11-07 DIAGNOSIS — Z13.220 LIPID SCREENING: ICD-10-CM

## 2023-11-07 DIAGNOSIS — Z00.00 WELLNESS EXAMINATION: ICD-10-CM

## 2023-11-07 DIAGNOSIS — Z13.0 SCREENING FOR DEFICIENCY ANEMIA: ICD-10-CM

## 2023-11-07 DIAGNOSIS — Z13.29 THYROID DISORDER SCREEN: ICD-10-CM

## 2023-11-07 DIAGNOSIS — R10.13 EPIGASTRIC PAIN: Primary | ICD-10-CM

## 2023-11-07 LAB
ALBUMIN SERPL-MCNC: 4.4 G/DL (ref 3.2–4.8)
ALBUMIN/GLOB SERPL: 1.5 {RATIO} (ref 1–2)
ALP LIVER SERPL-CCNC: 66 U/L
ALT SERPL-CCNC: 13 U/L
ANION GAP SERPL CALC-SCNC: 9 MMOL/L (ref 0–18)
AST SERPL-CCNC: 13 U/L (ref ?–34)
BASOPHILS # BLD AUTO: 0.05 X10(3) UL (ref 0–0.2)
BASOPHILS NFR BLD AUTO: 0.8 %
BILIRUB SERPL-MCNC: 0.4 MG/DL (ref 0.3–1.2)
BUN BLD-MCNC: 10 MG/DL (ref 9–23)
BUN/CREAT SERPL: 13.3 (ref 10–20)
CALCIUM BLD-MCNC: 9.8 MG/DL (ref 8.7–10.4)
CHLORIDE SERPL-SCNC: 105 MMOL/L (ref 98–112)
CHOLEST SERPL-MCNC: 184 MG/DL (ref ?–200)
CO2 SERPL-SCNC: 24 MMOL/L (ref 21–32)
CREAT BLD-MCNC: 0.75 MG/DL
DEPRECATED RDW RBC AUTO: 38.5 FL (ref 35.1–46.3)
EGFRCR SERPLBLD CKD-EPI 2021: 104 ML/MIN/1.73M2 (ref 60–?)
EOSINOPHIL # BLD AUTO: 0.06 X10(3) UL (ref 0–0.7)
EOSINOPHIL NFR BLD AUTO: 0.9 %
ERYTHROCYTE [DISTWIDTH] IN BLOOD BY AUTOMATED COUNT: 11.9 % (ref 11–15)
FASTING PATIENT LIPID ANSWER: NO
FASTING STATUS PATIENT QL REPORTED: NO
GLOBULIN PLAS-MCNC: 3 G/DL (ref 2.8–4.4)
GLUCOSE BLD-MCNC: 94 MG/DL (ref 70–99)
HCT VFR BLD AUTO: 42.9 %
HDLC SERPL-MCNC: 58 MG/DL (ref 40–59)
HGB BLD-MCNC: 14.5 G/DL
IMM GRANULOCYTES # BLD AUTO: 0.01 X10(3) UL (ref 0–1)
IMM GRANULOCYTES NFR BLD: 0.2 %
LDLC SERPL CALC-MCNC: 109 MG/DL (ref ?–100)
LYMPHOCYTES # BLD AUTO: 2.68 X10(3) UL (ref 1–4)
LYMPHOCYTES NFR BLD AUTO: 40.2 %
MCH RBC QN AUTO: 30.1 PG (ref 26–34)
MCHC RBC AUTO-ENTMCNC: 33.8 G/DL (ref 31–37)
MCV RBC AUTO: 89 FL
MONOCYTES # BLD AUTO: 0.51 X10(3) UL (ref 0.1–1)
MONOCYTES NFR BLD AUTO: 7.7 %
NEUTROPHILS # BLD AUTO: 3.35 X10 (3) UL (ref 1.5–7.7)
NEUTROPHILS # BLD AUTO: 3.35 X10(3) UL (ref 1.5–7.7)
NEUTROPHILS NFR BLD AUTO: 50.2 %
NONHDLC SERPL-MCNC: 126 MG/DL (ref ?–130)
OSMOLALITY SERPL CALC.SUM OF ELEC: 285 MOSM/KG (ref 275–295)
PLATELET # BLD AUTO: 288 10(3)UL (ref 150–450)
POTASSIUM SERPL-SCNC: 4.4 MMOL/L (ref 3.5–5.1)
PROT SERPL-MCNC: 7.4 G/DL (ref 5.7–8.2)
RBC # BLD AUTO: 4.82 X10(6)UL
SODIUM SERPL-SCNC: 138 MMOL/L (ref 136–145)
TRIGL SERPL-MCNC: 93 MG/DL (ref 30–149)
TSI SER-ACNC: 0.98 MIU/ML (ref 0.55–4.78)
VLDLC SERPL CALC-MCNC: 16 MG/DL (ref 0–30)
WBC # BLD AUTO: 6.7 X10(3) UL (ref 4–11)

## 2023-11-07 PROCEDURE — 85025 COMPLETE CBC W/AUTO DIFF WBC: CPT

## 2023-11-07 PROCEDURE — 80053 COMPREHEN METABOLIC PANEL: CPT

## 2023-11-07 PROCEDURE — 3078F DIAST BP <80 MM HG: CPT | Performed by: FAMILY MEDICINE

## 2023-11-07 PROCEDURE — 99213 OFFICE O/P EST LOW 20 MIN: CPT | Performed by: FAMILY MEDICINE

## 2023-11-07 PROCEDURE — 3074F SYST BP LT 130 MM HG: CPT | Performed by: FAMILY MEDICINE

## 2023-11-07 PROCEDURE — 80061 LIPID PANEL: CPT

## 2023-11-07 PROCEDURE — 84443 ASSAY THYROID STIM HORMONE: CPT

## 2023-11-07 PROCEDURE — 36415 COLL VENOUS BLD VENIPUNCTURE: CPT

## 2023-11-07 PROCEDURE — 3008F BODY MASS INDEX DOCD: CPT | Performed by: FAMILY MEDICINE

## 2023-11-18 ENCOUNTER — LAB ENCOUNTER (OUTPATIENT)
Dept: LAB | Age: 38
End: 2023-11-18
Attending: FAMILY MEDICINE
Payer: COMMERCIAL

## 2023-11-18 DIAGNOSIS — R10.13 EPIGASTRIC PAIN: ICD-10-CM

## 2023-11-18 PROCEDURE — 87338 HPYLORI STOOL AG IA: CPT

## 2023-11-20 LAB — H PYLORI AG STL QL IA: NEGATIVE

## 2023-11-21 ENCOUNTER — OFFICE VISIT (OUTPATIENT)
Dept: OBGYN CLINIC | Facility: CLINIC | Age: 38
End: 2023-11-21
Payer: COMMERCIAL

## 2023-11-21 VITALS
SYSTOLIC BLOOD PRESSURE: 116 MMHG | BODY MASS INDEX: 32.57 KG/M2 | HEART RATE: 79 BPM | DIASTOLIC BLOOD PRESSURE: 80 MMHG | HEIGHT: 67.5 IN | WEIGHT: 210 LBS

## 2023-11-21 DIAGNOSIS — Z12.4 SCREENING FOR CERVICAL CANCER: ICD-10-CM

## 2023-11-21 DIAGNOSIS — Z12.4 CERVICAL CANCER SCREENING: ICD-10-CM

## 2023-11-21 DIAGNOSIS — Z01.419 WELL WOMAN EXAM: Primary | ICD-10-CM

## 2023-11-21 PROCEDURE — 3079F DIAST BP 80-89 MM HG: CPT | Performed by: OBSTETRICS & GYNECOLOGY

## 2023-11-21 PROCEDURE — 3074F SYST BP LT 130 MM HG: CPT | Performed by: OBSTETRICS & GYNECOLOGY

## 2023-11-21 PROCEDURE — 3008F BODY MASS INDEX DOCD: CPT | Performed by: OBSTETRICS & GYNECOLOGY

## 2023-11-21 PROCEDURE — 99395 PREV VISIT EST AGE 18-39: CPT | Performed by: OBSTETRICS & GYNECOLOGY

## 2023-11-21 NOTE — PROGRESS NOTES
HPI:  The patient is a 44 yo F here for WWE. BF 6 mo old. No menses yet.  with vasectomy. NO gyn c/o     Patient's last menstrual period was 2022 (exact date).       Latest Ref Rng & Units 2022    10:27 AM 10/11/2021     1:35 PM 2020     7:31 PM 2019     1:36 PM 2019     1:30 PM 3/20/2017     9:46 AM   RECENT PAP RESULTS   Thinprep Pap Negative for intraepithelial lesion or malignancy Negative for intraepithelial lesion or malignancy  Negative for intraepithelial lesion or malignancy  Negative for intraepithelial lesion or malignancy  Negative for intraepithelial lesion or malignancy  Unsatisfactory for Evaluation  Negative for intraepithelial lesion or malignancy    HPV Negative Negative  Negative  Negative  Negative  Negative  Negative         Pap Date: 22  Pap Result Notes: NEG PAP / NEG HPV        Depression Screening (PHQ-2/PHQ-9): Over the LAST 2 WEEKS   Little interest or pleasure in doing things (over the last two weeks)?: Not at all    Feeling down, depressed, or hopeless (over the last two weeks)?: Not at all    PHQ-2 SCORE: 0          Reviewed medical and surgical history below       OBSTETRICS HISTORY:  OB History    Para Term  AB Living   4 3 3 0 1 3   SAB IAB Ectopic Multiple Live Births   0 0 0 0 3       GYNE HISTORY:  History   Sexual Activity    Sexual activity: Yes    Partners: Male            MEDICAL HISTORY:  Past Medical History:   Diagnosis Date    Decorative tattoo     HPV in female     Thyroid cyst     goiter    Varicella     Had chicken pox during childhood       SURGICAL HISTORY:  Past Surgical History:   Procedure Laterality Date    COLPOSCOPY, CERVIX W/UPPER ADJACENT VAGINA; W/BIOPSY(S), CERVIX      LEEP         SOCIAL HISTORY:  Social History     Socioeconomic History    Marital status:      Spouse name: Not on file    Number of children: Not on file    Years of education: Not on file    Highest education level: Not on file   Occupational History    Not on file   Tobacco Use    Smoking status: Former     Packs/day: 0.50     Years: 15.00     Additional pack years: 0.00     Total pack years: 7.50     Types: Cigarettes     Quit date: 3/17/2017     Years since quittin.6    Smokeless tobacco: Never   Vaping Use    Vaping Use: Never used   Substance and Sexual Activity    Alcohol use: Not Currently     Comment: SOCIALLY    Drug use: No    Sexual activity: Yes     Partners: Male   Other Topics Concern     Service Not Asked    Blood Transfusions Not Asked    Caffeine Concern Yes     Comment: Coffee 1 cup daily    Occupational Exposure Not Asked    Hobby Hazards Not Asked    Sleep Concern Not Asked    Stress Concern Not Asked    Weight Concern Not Asked    Special Diet Not Asked    Back Care Not Asked    Exercise Not Asked    Bike Helmet Not Asked    Seat Belt Not Asked    Self-Exams Not Asked    Grew up on a farm Not Asked    History of tanning Not Asked    Outdoor occupation Not Asked    Pt has a pacemaker No    Pt has a defibrillator No    Breast feeding Not Asked    Reaction to local anesthetic No   Social History Narrative    Not on file     Social Determinants of Health     Financial Resource Strain: Low Risk  (2023)    Financial Resource Strain     Difficulty of Paying Living Expenses: Not hard at all     Med Affordability: No   Food Insecurity: No Food Insecurity (2023)    Food Insecurity     Food Insecurity: Never true   Transportation Needs: No Transportation Needs (2023)    Transportation Needs     Lack of Transportation: No   Stress: No Stress Concern Present (2023)    Stress     Feeling of Stress : No   Housing Stability: Low Risk  (2023)    Housing Stability     Housing Instability: No     Housing Instability Emergency: Not on file       FAMILY HISTORY:  Family History   Problem Relation Age of Onset    Breast Cancer Paternal Aunt 61        unsure of age    Prostate Cancer Father MEDICATIONS:    Current Outpatient Medications:     prenatal vitamin with DHA 27-0.8-228 MG Oral Cap, Take 1 capsule by mouth daily. , Disp: , Rfl:     Cholecalciferol (VITAMIN D) 1000 units Oral Tab, Take 1,000 Units by mouth daily. , Disp: , Rfl:     ALLERGIES:  No Known Allergies      Review of Systems:  Constitutional:  Denies fevers and chills   Cardiovascular:  denies chest pain or palpitations  Respiratory:  denies shortness of breath  Gastrointestinal:  denies heartburn, abdominal pain, diarrhea or constipation  Genitourinary:  denies dysuria, incontinence, abnormal vaginal discharge, vaginal itching  Musculoskeletal:  denies back pain. Skin/Breast:  Denies any breast pain, lumps, or discharge. Neurological:  denies headaches, extremity weakness  Psychiatric: denies depression or anxiety.       Vitals:    11/21/23 0913   BP: 116/80   Pulse: 79       PHYSICAL EXAM:   Constitutional: well developed, well nourished  Head/Face: normocephalic  Neck/Thyroid: thyroid symmetric, no thyromegaly, no nodules, no adenopathy  Heart: Regular rate and rhythm   Lungs: clear to ascultation bilaterally   Lymphatic:no abnormal supraclavicular or axillary adenopathy is noted  Breast: normal without palpable masses, tenderness, asymmetry, nipple discharge, nipple retraction or skin changes  Abdomen:  soft, nontender, nondistended, no masses  Skin/Hair: no unusual rashes or bruises  Extremities: no edema, no cyanosis  Psychiatric: Appropriate mood and affect    Pelvic Exam:  External Genitalia: normal appearance, hair distribution, and no lesions  Urethral Meatus:  normal in size, location, without lesions and prolapse  Bladder:  No fullness, masses or tenderness  Vagina:  Normal appearance without lesions, no abnormal discharge  Cervix:  Normal without tenderness on motion  Uterus: normal in size, contour, position, mobility, without tenderness  Adnexa: normal without masses or tenderness  Perineum: normal    Assessment/Plan:  Corina Nur was seen today for gyn exam.    Diagnoses and all orders for this visit:    Well woman exam    Cervical cancer screening        WWE:   Reviewed ASCCP guidelines with the patient -- Pap at pt request  Contraception: vasectomy  Breast Health:     Mammo @ 37 yo  Encouraged monthly self breast exams with the patient   Discussed diet, exercise, MVIs with Vit D  Follow up in 1 yr for [facility]

## 2023-11-22 LAB — HPV I/H RISK 1 DNA SPEC QL NAA+PROBE: NEGATIVE

## 2023-11-26 ENCOUNTER — HOSPITAL ENCOUNTER (OUTPATIENT)
Dept: ULTRASOUND IMAGING | Age: 38
Discharge: HOME | End: 2023-11-26
Attending: FAMILY MEDICINE
Payer: COMMERCIAL

## 2023-11-26 ENCOUNTER — HOSPITAL ENCOUNTER (OUTPATIENT)
Dept: ULTRASOUND IMAGING | Age: 38
End: 2023-11-26
Attending: FAMILY MEDICINE
Payer: COMMERCIAL

## 2023-11-26 DIAGNOSIS — R10.13 EPIGASTRIC PAIN: ICD-10-CM

## 2023-11-26 PROCEDURE — 76700 US EXAM ABDOM COMPLETE: CPT | Performed by: FAMILY MEDICINE

## 2023-11-27 LAB — LAST PAP RESULT: NORMAL

## 2023-12-15 ENCOUNTER — HOSPITAL ENCOUNTER (OUTPATIENT)
Age: 38
Discharge: HOME | End: 2023-12-15
Payer: COMMERCIAL

## 2023-12-15 VITALS
RESPIRATION RATE: 18 BRPM | TEMPERATURE: 97 F | SYSTOLIC BLOOD PRESSURE: 135 MMHG | OXYGEN SATURATION: 98 % | DIASTOLIC BLOOD PRESSURE: 86 MMHG | HEART RATE: 77 BPM

## 2023-12-15 DIAGNOSIS — R00.2 PALPITATIONS: Primary | ICD-10-CM

## 2023-12-15 DIAGNOSIS — R53.83 FATIGUE, UNSPECIFIED TYPE: ICD-10-CM

## 2023-12-15 LAB
#MXD IC: 0.5 X10ˆ3/UL (ref 0.1–1)
ATRIAL RATE: 69 BPM
ATRIAL RATE: 72 BPM
B-HCG UR QL: NEGATIVE
BILIRUB UR QL STRIP: NEGATIVE
BUN BLD-MCNC: 13 MG/DL (ref 7–18)
CHLORIDE BLD-SCNC: 103 MMOL/L (ref 98–112)
CLARITY UR: CLEAR
CO2 BLD-SCNC: 24 MMOL/L (ref 21–32)
COLOR UR: YELLOW
CREAT BLD-MCNC: 0.7 MG/DL
EGFRCR SERPLBLD CKD-EPI 2021: 113 ML/MIN/1.73M2 (ref 60–?)
GLUCOSE BLD-MCNC: 92 MG/DL (ref 70–99)
GLUCOSE UR STRIP-MCNC: NEGATIVE MG/DL
HCT VFR BLD AUTO: 43.5 %
HCT VFR BLD CALC: 47 %
HGB BLD-MCNC: 14.4 G/DL
HGB UR QL STRIP: NEGATIVE
ISTAT IONIZED CALCIUM FOR CHEM 8: 1.16 MMOL/L (ref 1.12–1.32)
KETONES UR STRIP-MCNC: NEGATIVE MG/DL
LEUKOCYTE ESTERASE UR QL STRIP: NEGATIVE
LYMPHOCYTES # BLD AUTO: 2.1 X10ˆ3/UL (ref 1–4)
LYMPHOCYTES NFR BLD AUTO: 30.9 %
MCH RBC QN AUTO: 29.1 PG (ref 26–34)
MCHC RBC AUTO-ENTMCNC: 33.1 G/DL (ref 31–37)
MCV RBC AUTO: 87.9 FL (ref 80–100)
MIXED CELL %: 7 %
NEUTROPHILS # BLD AUTO: 4.3 X10ˆ3/UL (ref 1.5–7.7)
NEUTROPHILS NFR BLD AUTO: 62.1 %
NITRITE UR QL STRIP: NEGATIVE
P AXIS: 49 DEGREES
P AXIS: 52 DEGREES
P-R INTERVAL: 152 MS
P-R INTERVAL: 152 MS
PH UR STRIP: 5.5 [PH]
PLATELET # BLD AUTO: 260 X10ˆ3/UL (ref 150–450)
POTASSIUM BLD-SCNC: 4.8 MMOL/L (ref 3.6–5.1)
PROT UR STRIP-MCNC: NEGATIVE MG/DL
Q-T INTERVAL: 388 MS
Q-T INTERVAL: 396 MS
QRS DURATION: 82 MS
QRS DURATION: 84 MS
QTC CALCULATION (BEZET): 415 MS
QTC CALCULATION (BEZET): 433 MS
R AXIS: 69 DEGREES
R AXIS: 69 DEGREES
RBC # BLD AUTO: 4.95 X10ˆ6/UL
SODIUM BLD-SCNC: 139 MMOL/L (ref 136–145)
SP GR UR STRIP: <=1.005
T AXIS: 43 DEGREES
T AXIS: 46 DEGREES
TROPONIN I BLD-MCNC: <0.02 NG/ML
UROBILINOGEN UR STRIP-ACNC: <2 MG/DL
VENTRICULAR RATE: 69 BPM
VENTRICULAR RATE: 72 BPM
WBC # BLD AUTO: 6.9 X10ˆ3/UL (ref 4–11)

## 2023-12-15 PROCEDURE — 99204 OFFICE O/P NEW MOD 45 MIN: CPT

## 2023-12-15 PROCEDURE — 93005 ELECTROCARDIOGRAM TRACING: CPT

## 2023-12-15 PROCEDURE — 81514 NFCT DS BV&VAGINITIS DNA ALG: CPT | Performed by: PHYSICIAN ASSISTANT

## 2023-12-15 PROCEDURE — 93010 ELECTROCARDIOGRAM REPORT: CPT

## 2023-12-15 PROCEDURE — 84484 ASSAY OF TROPONIN QUANT: CPT

## 2023-12-15 PROCEDURE — 81025 URINE PREGNANCY TEST: CPT

## 2023-12-15 PROCEDURE — 99214 OFFICE O/P EST MOD 30 MIN: CPT

## 2023-12-15 PROCEDURE — 93010 ELECTROCARDIOGRAM REPORT: CPT | Performed by: INTERNAL MEDICINE

## 2023-12-15 PROCEDURE — 36415 COLL VENOUS BLD VENIPUNCTURE: CPT

## 2023-12-15 PROCEDURE — 80047 BASIC METABLC PNL IONIZED CA: CPT

## 2023-12-15 PROCEDURE — 85025 COMPLETE CBC W/AUTO DIFF WBC: CPT | Performed by: PHYSICIAN ASSISTANT

## 2023-12-15 PROCEDURE — 81002 URINALYSIS NONAUTO W/O SCOPE: CPT

## 2023-12-15 NOTE — ED INITIAL ASSESSMENT (HPI)
Pt here with complains of urinary frequency since this morning, abdominal fullness with nausea. Denies any vomiting, diarrhea or constipation. Pt also reports brief palpitations this morning that have resolved. Denies any chest pain or sob at this time.

## 2023-12-15 NOTE — DISCHARGE INSTRUCTIONS
Alternate Tylenol and Motrin every 3 hours as needed for pain   Drink plenty of water and get plenty of rest    Eat a bland diet     Follow up with your primary care provider

## 2023-12-16 LAB
BV BACTERIA DNA VAG QL NAA+PROBE: NEGATIVE
C GLABRATA DNA VAG QL NAA+PROBE: NEGATIVE
C KRUSEI DNA VAG QL NAA+PROBE: NEGATIVE
CANDIDA DNA VAG QL NAA+PROBE: NEGATIVE
T VAGINALIS DNA VAG QL NAA+PROBE: NEGATIVE

## 2023-12-20 ENCOUNTER — OFFICE VISIT (OUTPATIENT)
Dept: FAMILY MEDICINE CLINIC | Facility: CLINIC | Age: 38
End: 2023-12-20
Payer: COMMERCIAL

## 2023-12-20 VITALS
HEART RATE: 62 BPM | HEIGHT: 67.5 IN | DIASTOLIC BLOOD PRESSURE: 73 MMHG | WEIGHT: 208 LBS | BODY MASS INDEX: 32.27 KG/M2 | SYSTOLIC BLOOD PRESSURE: 117 MMHG

## 2023-12-20 DIAGNOSIS — K59.00 CONSTIPATION, UNSPECIFIED CONSTIPATION TYPE: ICD-10-CM

## 2023-12-20 DIAGNOSIS — R35.0 URINARY FREQUENCY: Primary | ICD-10-CM

## 2023-12-20 LAB
APPEARANCE: CLEAR
BILIRUBIN: NEGATIVE
GLUCOSE (URINE DIPSTICK): NEGATIVE MG/DL
KETONES (URINE DIPSTICK): NEGATIVE MG/DL
LEUKOCYTES: NEGATIVE
MULTISTIX LOT#: NORMAL NUMERIC
NITRITE, URINE: NEGATIVE
OCCULT BLOOD: NEGATIVE
PH, URINE: 7 (ref 4.5–8)
PROTEIN (URINE DIPSTICK): NEGATIVE MG/DL
SPECIFIC GRAVITY: 1.02 (ref 1–1.03)
URINE-COLOR: YELLOW
UROBILINOGEN,SEMI-QN: 0.2 MG/DL (ref 0–1.9)

## 2023-12-20 PROCEDURE — 3074F SYST BP LT 130 MM HG: CPT | Performed by: PHYSICIAN ASSISTANT

## 2023-12-20 PROCEDURE — 3008F BODY MASS INDEX DOCD: CPT | Performed by: PHYSICIAN ASSISTANT

## 2023-12-20 PROCEDURE — 99213 OFFICE O/P EST LOW 20 MIN: CPT | Performed by: PHYSICIAN ASSISTANT

## 2023-12-20 PROCEDURE — 81002 URINALYSIS NONAUTO W/O SCOPE: CPT | Performed by: PHYSICIAN ASSISTANT

## 2023-12-20 PROCEDURE — 3078F DIAST BP <80 MM HG: CPT | Performed by: PHYSICIAN ASSISTANT

## 2024-02-07 ENCOUNTER — NURSE TRIAGE (OUTPATIENT)
Dept: FAMILY MEDICINE CLINIC | Facility: CLINIC | Age: 39
End: 2024-02-07

## 2024-02-07 NOTE — TELEPHONE ENCOUNTER
Action Requested: Summary for Provider     []  Critical Lab, Recommendations Needed  [] Need Additional Advice  [x]   FYI    []   Need Orders  [] Need Medications Sent to Pharmacy  []  Other     SUMMARY:   Spoke with pt,  verified, pt stated she had 1-3 episodes of feeling her heart is skipping a bit infrequently, sx been going on for  1-2 mos.   Pt drinik 1/2 cup a day of coffe, advised to avoid caffeine for now.    Pt denies chest pain, shortness of breath, headache, numbness/ tingling, dizziness, no other sx.   Pt was advised if sx persist or gets worse to go to ER/ IC, she agreed and stated understanding.   Pt stated she already made an appt on 3-6-24 with Dr Fuentes but hoping to get in sooner with any available Provider.   Appt made with See SERRA for eval & treat.            Reason for call: palpitation  Onset: 2-3 months         Future Appointments   Date Time Provider Department Center   2024  8:45 AM Denis Haque PA-C [facility]   3/6/2024  8:45 AM Kellen De La Rosa MD [facility]                 Reason for Disposition   Palpitations and no improvement after following Care Advice    Protocols used: Heart Rate and Heartbeat [id number]

## 2024-02-07 NOTE — TELEPHONE ENCOUNTER
Patient scheduled an appt via [facility] for the following concern:    Heart skipping beats randomly.

## 2024-02-12 ENCOUNTER — OFFICE VISIT (OUTPATIENT)
Dept: FAMILY MEDICINE CLINIC | Facility: CLINIC | Age: 39
End: 2024-02-12
Payer: COMMERCIAL

## 2024-02-12 VITALS
SYSTOLIC BLOOD PRESSURE: 110 MMHG | DIASTOLIC BLOOD PRESSURE: 75 MMHG | HEART RATE: 77 BPM | HEIGHT: 67.5 IN | BODY MASS INDEX: 32.42 KG/M2 | WEIGHT: 209 LBS

## 2024-02-12 DIAGNOSIS — R00.2 PALPITATION: Primary | ICD-10-CM

## 2024-02-12 PROCEDURE — 3074F SYST BP LT 130 MM HG: CPT | Performed by: PHYSICIAN ASSISTANT

## 2024-02-12 PROCEDURE — 3078F DIAST BP <80 MM HG: CPT | Performed by: PHYSICIAN ASSISTANT

## 2024-02-12 PROCEDURE — 3008F BODY MASS INDEX DOCD: CPT | Performed by: PHYSICIAN ASSISTANT

## 2024-02-12 PROCEDURE — 99213 OFFICE O/P EST LOW 20 MIN: CPT | Performed by: PHYSICIAN ASSISTANT

## 2024-02-12 RX ORDER — MAGNESIUM 30 MG
30 TABLET ORAL 2 TIMES DAILY
COMMUNITY

## 2024-02-12 NOTE — PROGRESS NOTES
HPI:     HPI  38 year-old female is here in the office complaining of intermittent palpitation for the past 2 months.  Lab work up and EKG were normal.  Patient denies of chest pain, SOB, N/V/C/D, fever, dizziness, syncope, abdominal pain. There are no other concerns today.    Medications:     Current Outpatient Medications   Medication Sig Dispense Refill    magnesium 30 MG Oral Tab Take 1 tablet (30 mg total) by mouth 2 (two) times daily.      prenatal vitamin with DHA 27-0.8-228 MG Oral Cap Take 1 capsule by mouth daily.      Cholecalciferol (VITAMIN D) 1000 units Oral Tab Take 1,000 Units by mouth daily.           Allergies:   No Known Allergies    History:     Health Maintenance   Topic Date Due    COVID-19 Vaccine (4 - 2023-24 season) 09/01/2023    Annual Depression Screening  01/01/2024    Influenza Vaccine (1) 12/20/2024 (Originally 10/1/2023)    Annual Physical  07/05/2024    Pap Smear  11/21/2026    DTaP,Tdap,and Td Vaccines (4 - Td or Tdap) 04/14/2033    Pneumococcal Vaccine: Birth to 64yrs  Aged Out       Patient's last menstrual period was 01/31/2024 (approximate).   Past Medical History:     Past Medical History:   Diagnosis Date    Decorative tattoo     HPV in female     Thyroid cyst     goiter    Varicella     Had chicken pox during childhood       Past Surgical History:     Past Surgical History:   Procedure Laterality Date    Colposcopy, cervix w/upper adjacent vagina; w/biopsy(s), cervix      Leep  2015       Family History:     Family History   Problem Relation Age of Onset    Breast Cancer Paternal Aunt 60        unsure of age    Prostate Cancer Father        Social History:     Social History     Socioeconomic History    Marital status:      Spouse name: Not on file    Number of children: Not on file    Years of education: Not on file    Highest education level: Not on file   Occupational History    Not on file   Tobacco Use    Smoking status: Former     Packs/day: 0.50     Years: 15.00      Additional pack years: 0.00     Total pack years: 7.50     Types: Cigarettes     Quit date: 3/17/2017     Years since quittin.9    Smokeless tobacco: Never   Vaping Use    Vaping Use: Never used   Substance and Sexual Activity    Alcohol use: Not Currently     Comment: SOCIALLY    Drug use: No    Sexual activity: Yes     Partners: Male   Other Topics Concern     Service Not Asked    Blood Transfusions Not Asked    Caffeine Concern Yes     Comment: Coffee 1 cup daily    Occupational Exposure Not Asked    Hobby Hazards Not Asked    Sleep Concern Not Asked    Stress Concern Not Asked    Weight Concern Not Asked    Special Diet Not Asked    Back Care Not Asked    Exercise Not Asked    Bike Helmet Not Asked    Seat Belt Not Asked    Self-Exams Not Asked    Grew up on a farm Not Asked    History of tanning Not Asked    Outdoor occupation Not Asked    Pt has a pacemaker No    Pt has a defibrillator No    Breast feeding Not Asked    Reaction to local anesthetic No   Social History Narrative    Not on file     Social Determinants of Health     Financial Resource Strain: Low Risk  (2023)    Financial Resource Strain     Difficulty of Paying Living Expenses: Not hard at all     Med Affordability: No   Food Insecurity: No Food Insecurity (2023)    Food Insecurity     Food Insecurity: Never true   Transportation Needs: No Transportation Needs (2023)    Transportation Needs     Lack of Transportation: No   Stress: No Stress Concern Present (2023)    Stress     Feeling of Stress : No   Housing Stability: Low Risk  (2023)    Housing Stability     Housing Instability: No     Housing Instability Emergency: Not on file       Review of Systems:   Review of Systems   Constitutional:  Negative for activity change, chills, fatigue and fever.   HENT:  Negative for congestion, ear discharge, ear pain, postnasal drip, rhinorrhea, sinus pressure, sinus pain and sore throat.    Respiratory:  Negative  for cough, chest tightness, shortness of breath and wheezing.    Cardiovascular:  Positive for palpitations. Negative for chest pain.   Gastrointestinal:  Negative for abdominal distention, abdominal pain, blood in stool, constipation, diarrhea, nausea and vomiting.   Skin:  Negative for rash.        Vitals:    02/12/24 0917   BP: 110/75   Pulse: 77   Weight: 209 lb (94.8 kg)   Height: 5' 7.5\" (1.715 m)     Body mass index is 32.25 kg/m².    Physical Exam:   Physical Exam  Vitals reviewed.   Cardiovascular:      Rate and Rhythm: Normal rate and regular rhythm.      Heart sounds: Normal heart sounds.   Pulmonary:      Effort: Pulmonary effort is normal.      Breath sounds: Normal breath sounds.          Assessment and Plan::     Problem List Items Addressed This Visit    None  Visit Diagnoses       Palpitation    -  Primary    Relevant Medications        Other Relevant Orders    CARD MONITOR HOLTER ZIO 3-7 DAYS (CPT=93242/[id number])          Discussed plan of care with pt and pt is in agreement.All questions answered. Pt to call with questions or concerns.

## 2024-02-13 ENCOUNTER — HOSPITAL ENCOUNTER (OUTPATIENT)
Dept: CV DIAGNOSTICS | Facility: HOSPITAL | Age: 39
Discharge: HOME | End: 2024-02-13
Attending: PHYSICIAN ASSISTANT
Payer: COMMERCIAL

## 2024-02-13 DIAGNOSIS — R00.2 PALPITATION: ICD-10-CM

## 2024-02-13 PROCEDURE — 93242 EXT ECG>48HR<7D RECORDING: CPT | Performed by: PHYSICIAN ASSISTANT

## 2024-02-13 PROCEDURE — 93243 EXT ECG>48HR<7D SCAN A/R: CPT | Performed by: PHYSICIAN ASSISTANT

## 2024-03-14 ENCOUNTER — HOSPITAL ENCOUNTER (OUTPATIENT)
Age: 39
Discharge: HOME | End: 2024-03-14
Attending: EMERGENCY MEDICINE
Payer: COMMERCIAL

## 2024-03-14 VITALS
TEMPERATURE: 97 F | OXYGEN SATURATION: 100 % | SYSTOLIC BLOOD PRESSURE: 126 MMHG | RESPIRATION RATE: 20 BRPM | DIASTOLIC BLOOD PRESSURE: 74 MMHG | HEART RATE: 92 BPM

## 2024-03-14 DIAGNOSIS — R07.89 CHEST DISCOMFORT: Primary | ICD-10-CM

## 2024-03-14 PROCEDURE — 99212 OFFICE O/P EST SF 10 MIN: CPT

## 2024-03-14 PROCEDURE — 93010 ELECTROCARDIOGRAM REPORT: CPT

## 2024-03-14 PROCEDURE — 99214 OFFICE O/P EST MOD 30 MIN: CPT

## 2024-03-14 PROCEDURE — 93005 ELECTROCARDIOGRAM TRACING: CPT

## 2024-03-15 LAB
ATRIAL RATE: 76 BPM
P AXIS: 56 DEGREES
P-R INTERVAL: 156 MS
Q-T INTERVAL: 368 MS
QRS DURATION: 86 MS
QTC CALCULATION (BEZET): 414 MS
R AXIS: 74 DEGREES
T AXIS: 35 DEGREES
VENTRICULAR RATE: 76 BPM

## 2024-03-15 NOTE — ED INITIAL ASSESSMENT (HPI)
Unable to explained the problem but per pt. she feel like her heart is \"fluttering\" and feeling that her \"heart is sucking down to her stomach\" had the same episode on Dec also on Feb. But this time she feel it again started yesterday. Denies SOB.

## 2024-03-15 NOTE — ED PROVIDER NOTES
Patient Seen in: [facility]      History     Chief Complaint   Patient presents with    Arrythmia/Palpitations     Stated Complaint: Heart palpitations    Subjective:   HPI    Patient is a 38-year-old female who presents with a sensation like something is being sucked from her chest into her abdomen.  She has had this before and states it is not really like palpitations or chest pain but has difficulty describing the sensation although it makes her anxious.  She states it normally goes away on its own but today it has been occurring all day so she wanted to come in to see if she could catch any arrhythmia.  She denies any shortness of breath, dizziness or any other symptoms.  She has had negative workup in the past including blood work as of recent.    Objective:   Past Medical History:   Diagnosis Date    Decorative tattoo     HPV in female     Thyroid cyst     goiter    Varicella     Had chicken pox during childhood              Past Surgical History:   Procedure Laterality Date    COLPOSCOPY, CERVIX W/UPPER ADJACENT VAGINA; W/BIOPSY(S), CERVIX      LEEP                  Social History     Socioeconomic History    Marital status:    Tobacco Use    Smoking status: Former     Packs/day: 0.50     Years: 15.00     Additional pack years: 0.00     Total pack years: 7.50     Types: Cigarettes     Quit date: 3/17/2017     Years since quittin.9    Smokeless tobacco: Never   Vaping Use    Vaping Use: Never used   Substance and Sexual Activity    Alcohol use: Not Currently     Comment: SOCIALLY    Drug use: No    Sexual activity: Yes     Partners: Male   Other Topics Concern    Caffeine Concern Yes     Comment: Coffee 1 cup daily    Pt has a pacemaker No    Pt has a defibrillator No    Reaction to local anesthetic No     Social Determinants of Health     Financial Resource Strain: Low Risk  (2023)    Financial Resource Strain     Difficulty of Paying Living Expenses: Not hard at all     Med  Affordability: No   Food Insecurity: No Food Insecurity (5/28/2023)    Food Insecurity     Food Insecurity: Never true   Transportation Needs: No Transportation Needs (5/28/2023)    Transportation Needs     Lack of Transportation: No   Stress: No Stress Concern Present (5/28/2023)    Stress     Feeling of Stress : No   Housing Stability: Low Risk  (5/28/2023)    Housing Stability     Housing Instability: No              Review of Systems    Positive for stated complaint: Heart palpitations  Other systems are as noted in HPI.  Constitutional and vital signs reviewed.      All other systems reviewed and negative except as noted above.    Physical Exam     ED Triage Vitals [03/14/24 1900]   /74   Pulse 92   Resp 20   Temp 97.4 °F (36.3 °C)   Temp src Temporal   SpO2 100 %   O2 Device None (Room air)       Current:/74   Pulse 92   Temp 97.4 °F (36.3 °C) (Temporal)   Resp 20   LMP 02/28/2024 (Approximate)   SpO2 100%         Physical Exam    GENERAL: No acute distress, awake and alert  HEENT: EOMI, PERRL  Neck: supple  CV: RRR, 2/6 KATELYN  Resp: CTAB, no wheezes or retractions  Extremities: FROM of all extremities  Neuro: CN intact, normal speech, normal gait, 5/5 motor strength in all extremities, no focal deficits  SKIN: warm, dry, no rashes      ED Course   Labs Reviewed - No data to display       MDM       Medical Decision Making  Patient I discussed extensively possible causes of her abnormal feeling including arrhythmia, hiatal hernia, thyroid, acid reflux, heart murmur, anxiety.  She feels comfortable with holding off on any blood work at this time as she has had recent and normal workups.  Advise follow-up with cardiology and urged strongly on ER precautions. D/w patient limitations of urgent care.     Amount and/or Complexity of Data Reviewed  External Data Reviewed: labs and notes.     Details: Labs, pcp notes, heart monitor 2024 results reviewed  ECG/medicine tests: ordered and independent  interpretation performed.     Details: NSR rate 76 no arrhythmia        Disposition and Plan     Clinical Impression:  1. Chest discomfort         Disposition:  Discharge  3/14/2024  7:17 pm    Follow-up:  Eduin Ness MD  [address] [place] [address] [phone]    Schedule an appointment as soon as possible for a visit             Medications Prescribed:  Current Discharge Medication List

## 2024-03-18 ENCOUNTER — OFFICE VISIT (OUTPATIENT)
Dept: FAMILY MEDICINE CLINIC | Facility: CLINIC | Age: 39
End: 2024-03-18
Payer: COMMERCIAL

## 2024-03-18 VITALS
BODY MASS INDEX: 32.81 KG/M2 | HEART RATE: 62 BPM | RESPIRATION RATE: 14 BRPM | DIASTOLIC BLOOD PRESSURE: 68 MMHG | SYSTOLIC BLOOD PRESSURE: 106 MMHG | HEIGHT: 67.5 IN | WEIGHT: 211.5 LBS

## 2024-03-18 DIAGNOSIS — R00.2 PALPITATION: ICD-10-CM

## 2024-03-18 DIAGNOSIS — R07.89 CHEST PRESSURE: Primary | ICD-10-CM

## 2024-03-18 PROCEDURE — 3078F DIAST BP <80 MM HG: CPT | Performed by: FAMILY MEDICINE

## 2024-03-18 PROCEDURE — 3008F BODY MASS INDEX DOCD: CPT | Performed by: FAMILY MEDICINE

## 2024-03-18 PROCEDURE — 3074F SYST BP LT 130 MM HG: CPT | Performed by: FAMILY MEDICINE

## 2024-03-18 PROCEDURE — 99214 OFFICE O/P EST MOD 30 MIN: CPT | Performed by: FAMILY MEDICINE

## 2024-03-18 RX ORDER — PANTOPRAZOLE SODIUM 40 MG/1
40 TABLET, DELAYED RELEASE ORAL
Qty: 90 TABLET | Refills: 0 | Status: SHIPPED | OUTPATIENT
Start: 2024-03-18

## 2024-03-18 NOTE — PROGRESS NOTES
Renetta Milian is a 38 year old female.  HPI:   Patient is here to follow-up after Holter and recent urgent care visit, patient reports that she has 3 months history of a sensation of chest tightness where she feels that something from her chest drops down, she also feels at times she cannot take deep breath when this happens, some palpitations noted as well, there is no specific trigger for the symptoms, they self resolve, they are intermittent and not daily.  She had a Holter that was nonsignificant, in recent urgent care evaluation EKG was completed that only showed sinus arrhythmia, she continues to be symptomatic.  She is breast-feeding.  She has no other associated symptoms.  Current Outpatient Medications   Medication Sig Dispense Refill    pantoprazole 40 MG Oral Tab EC Take 1 tablet (40 mg total) by mouth every morning before breakfast. 90 tablet 0    prenatal vitamin with DHA 27-0.8-228 MG Oral Cap Take 1 capsule by mouth daily.      Cholecalciferol (VITAMIN D) 1000 units Oral Tab Take 1,000 Units by mouth daily.        magnesium 30 MG Oral Tab Take 1 tablet (30 mg total) by mouth 2 (two) times daily. (Patient not taking: Reported on 3/18/2024)        Past Medical History:   Diagnosis Date    Decorative tattoo     HPV in female     Thyroid cyst     goiter    Varicella     Had chicken pox during childhood      Social History:  Social History     Socioeconomic History    Marital status:    Tobacco Use    Smoking status: Former     Packs/day: 0.50     Years: 15.00     Additional pack years: 0.00     Total pack years: 7.50     Types: Cigarettes     Quit date: 3/17/2017     Years since quittin.0    Smokeless tobacco: Never   Vaping Use    Vaping Use: Never used   Substance and Sexual Activity    Alcohol use: Not Currently     Comment: SOCIALLY    Drug use: No    Sexual activity: Yes     Partners: Male   Other Topics Concern    Caffeine Concern Yes     Comment: Coffee 1 cup daily    Pt has a  pacemaker No    Pt has a defibrillator No    Reaction to local anesthetic No     Social Determinants of Health     Financial Resource Strain: Low Risk  (5/28/2023)    Financial Resource Strain     Difficulty of Paying Living Expenses: Not hard at all     Med Affordability: No   Food Insecurity: No Food Insecurity (5/28/2023)    Food Insecurity     Food Insecurity: Never true   Transportation Needs: No Transportation Needs (5/28/2023)    Transportation Needs     Lack of Transportation: No   Stress: No Stress Concern Present (5/28/2023)    Stress     Feeling of Stress : No   Housing Stability: Low Risk  (5/28/2023)    Housing Stability     Housing Instability: No          EXAM:   /68   Pulse 62   Resp 14   Ht 5' 7.5\" (1.715 m)   Wt 211 lb 8 oz (95.9 kg)   LMP 02/28/2024 (Approximate)   BMI 32.64 kg/m²     Physical Exam  Constitutional:       General: She is not in acute distress.  Pulmonary:      Effort: Pulmonary effort is normal.   Neurological:      Mental Status: She is alert and oriented to person, place, and time.   Psychiatric:         Mood and Affect: Mood normal.         Behavior: Behavior normal.            ASSESSMENT AND PLAN:   Unclear cause of symptoms, patient may be presenting with esophageal spasm or intermittent arrhythmia, will start empiric PPI to rule out spasms, will refer to cardiology to complete further testing and to evaluate if symptoms can be triggered, if symptoms resolved with PPI we will only continue monitoring.    1. Chest pressure    - Cardio Referral - Internal  - Magnesium [E]; Future  - pantoprazole 40 MG Oral Tab EC; Take 1 tablet (40 mg total) by mouth every morning before breakfast.  Dispense: 90 tablet; Refill: 0    2. Palpitation    - Cardio Referral - Internal  - TSH W Reflex To Free T4 [E]; Future  - Comp Metabolic Panel (14) [E]; Future       The patient indicates understanding of these issues and agrees to the plan.      The above note was creating using Dragon  speech recognition technology. Please excuse any typos.

## 2024-05-30 ENCOUNTER — NURSE TRIAGE (OUTPATIENT)
Dept: FAMILY MEDICINE CLINIC | Facility: CLINIC | Age: 39
End: 2024-05-30

## 2024-05-30 NOTE — TELEPHONE ENCOUNTER
Action Requested: Summary for Provider     []  Critical Lab, Recommendations Needed  [] Need Additional Advice  [x]   FYI    []   Need Orders  [] Need Medications Sent to Pharmacy  []  Other     SUMMARY: Per protocol, patient should be seen in the office today or tomorrow. Requesting to be seen tomorrow. Appointment scheduled.    Future Appointments   Date Time Provider Department Center   5/31/2024  3:30 PM Pam Ny APRN [facility]   6/3/2024  9:00 AM Hoda Villarreal APRN [facility]     Reason for call: Sinus Problem  Onset: 5/30    Patient reports of sore throat and postnasal drip that started a few weeks ago. Today, complains of feeling dizzy all day long, with pressure in the face and ears, feels tired and \"I don't feel well.\" Ears also feel clogged. Denies fever or cough. She has been taking Zyrtec for a few weeks now with no relief. Started taking Flonase this morning. Care advice given per protocol. Patient verbalized understanding and agreed with plan of care.     Reason for Disposition   Patient wants to be seen    Protocols used: Sinus Pain or Congestion-A-[place]

## 2024-05-31 ENCOUNTER — OFFICE VISIT (OUTPATIENT)
Dept: INTERNAL MEDICINE CLINIC | Facility: CLINIC | Age: 39
End: 2024-05-31
Payer: COMMERCIAL

## 2024-05-31 VITALS
DIASTOLIC BLOOD PRESSURE: 72 MMHG | WEIGHT: 216 LBS | SYSTOLIC BLOOD PRESSURE: 126 MMHG | BODY MASS INDEX: 33.51 KG/M2 | HEART RATE: 88 BPM | OXYGEN SATURATION: 98 % | HEIGHT: 67.5 IN

## 2024-05-31 DIAGNOSIS — R22.31 SUBCUTANEOUS NODULE OF RIGHT THUMB: ICD-10-CM

## 2024-05-31 DIAGNOSIS — R42 VERTIGO: Primary | ICD-10-CM

## 2024-05-31 DIAGNOSIS — H69.91 ACUTE DYSFUNCTION OF RIGHT EUSTACHIAN TUBE: ICD-10-CM

## 2024-05-31 PROCEDURE — 3078F DIAST BP <80 MM HG: CPT

## 2024-05-31 PROCEDURE — 99214 OFFICE O/P EST MOD 30 MIN: CPT

## 2024-05-31 PROCEDURE — 3074F SYST BP LT 130 MM HG: CPT

## 2024-05-31 PROCEDURE — 3008F BODY MASS INDEX DOCD: CPT

## 2024-05-31 RX ORDER — PREDNISONE 20 MG/1
20 TABLET ORAL DAILY
Qty: 5 TABLET | Refills: 0 | Status: SHIPPED | OUTPATIENT
Start: 2024-05-31 | End: 2024-06-05

## 2024-05-31 RX ORDER — MECLIZINE HYDROCHLORIDE 25 MG/1
25 TABLET ORAL 3 TIMES DAILY PRN
Qty: 30 TABLET | Refills: 0 | Status: SHIPPED | OUTPATIENT
Start: 2024-05-31

## 2024-05-31 NOTE — PROGRESS NOTES
Subjective:   Renetta Milian is a 38 year old female who presents for Sinus Problem and Sore Throat     Has seasonal allergies, has been waking up with a sore throat  Has been taking zyrtec for two weeks  Yesterday woke up and her head felt full and had vertigo, her  had to help her down the stairs as the room was spinning. She does have a history of positional vertigo.  Also having lots of sinus pressure and right ear pressure   No nasal drainage, feels lots of post nasal drip  Started taking flonase   No nausea, vomiting, no falls   No cough, no fever or chills     She has had a nodule in the IP joint of the right thumb for \"a while\", possibly up to a year. It is painful to touch and she believes it is getting larger.    History/Other:      Chief Complaint Reviewed and Verified  No Further Nursing Notes to   Review  Tobacco Reviewed  Allergies Reviewed  Medications Reviewed    Problem List Reviewed  Medical History Reviewed  Surgical History   Reviewed  OB Status Reviewed  Family History Reviewed         Tobacco:  She smoked tobacco in the past but quit greater than 12 months ago.  Social History     Tobacco Use   Smoking Status Former    Current packs/day: 0.00    Average packs/day: 0.5 packs/day for 15.0 years (7.5 ttl pk-yrs)    Types: Cigarettes    Start date: 3/17/2002    Quit date: 3/17/2017    Years since quittin.2   Smokeless Tobacco Never        Current Outpatient Medications   Medication Sig Dispense Refill    meclizine 25 MG Oral Tab Take 1 tablet (25 mg total) by mouth 3 (three) times daily as needed. 30 tablet 0    predniSONE 20 MG Oral Tab Take 1 tablet (20 mg total) by mouth daily for 5 days. 5 tablet 0    prenatal vitamin with DHA 27-0.8-228 MG Oral Cap Take 1 capsule by mouth daily.      Cholecalciferol (VITAMIN D) 1000 units Oral Tab Take 1,000 Units by mouth daily.        pantoprazole 40 MG Oral Tab EC Take 1 tablet (40 mg total) by mouth every morning before breakfast. 90  tablet 0    magnesium 30 MG Oral Tab Take 1 tablet (30 mg total) by mouth 2 (two) times daily. (Patient not taking: Reported on 3/18/2024)           Review of Systems:  Review of Systems   Constitutional: Negative.  Negative for chills and fever.   HENT:  Positive for congestion and ear pain. Negative for sinus pressure, sinus pain, sneezing and sore throat.    Respiratory: Negative.  Negative for cough.    Cardiovascular: Negative.    Gastrointestinal: Negative.    Skin: Negative.    Neurological:  Positive for dizziness.     Objective:   /72   Pulse 88   Ht 5' 7.5\" (1.715 m)   Wt 216 lb (98 kg)   LMP 05/24/2024 (Approximate)   SpO2 98%   BMI 33.33 kg/m²  Estimated body mass index is 33.33 kg/m² as calculated from the following:    Height as of this encounter: 5' 7.5\" (1.715 m).    Weight as of this encounter: 216 lb (98 kg).  Physical Exam  Vitals reviewed.   Constitutional:       General: She is not in acute distress.     Appearance: Normal appearance. She is well-developed.   HENT:      Right Ear: A middle ear effusion is present.      Left Ear: Tympanic membrane normal.   Cardiovascular:      Rate and Rhythm: Normal rate and regular rhythm.      Heart sounds: Normal heart sounds.   Pulmonary:      Effort: Pulmonary effort is normal.      Breath sounds: Normal breath sounds.   Abdominal:      General: Bowel sounds are normal.      Palpations: Abdomen is soft.   Lymphadenopathy:      Cervical: No cervical adenopathy.   Skin:     General: Skin is warm and dry.   Neurological:      Mental Status: She is alert and oriented to person, place, and time.      Comments: Positive [place] Hallpike maneuver bilaterally but worse on the right       Assessment & Plan:   1. Vertigo (Primary)  -     Meclizine HCl; Take 1 tablet (25 mg total) by mouth 3 (three) times daily as needed.  Dispense: 30 tablet; Refill: 0  -     predniSONE; Take 1 tablet (20 mg total) by mouth daily for 5 days.  Dispense: 5 tablet; Refill:  0  If symptoms not improving will likely need to see ENT   Discussed breastfeeding information for both medications  2. Acute dysfunction of right eustachian tube  -     predniSONE; Take 1 tablet (20 mg total) by mouth daily for 5 days.  Dispense: 5 tablet; Refill: 0  3. Subcutaneous nodule of right thumb  -     ORTHOPEDIC - INTERNAL  -     XR FINGER(S) (MIN 2 VIEWS), RIGHT THUMB (CPT=73140); Future; Expected date: 05/31/2024    CYRUS Desouza, 5/31/2024, 3:39 PM

## 2024-06-20 ENCOUNTER — HOSPITAL ENCOUNTER (OUTPATIENT)
Dept: GENERAL RADIOLOGY | Age: 39
Discharge: HOME | End: 2024-06-20

## 2024-06-20 ENCOUNTER — LAB ENCOUNTER (OUTPATIENT)
Dept: LAB | Age: 39
End: 2024-06-20

## 2024-06-20 DIAGNOSIS — R22.31 SUBCUTANEOUS NODULE OF RIGHT THUMB: ICD-10-CM

## 2024-06-20 DIAGNOSIS — R07.89 CHEST PRESSURE: ICD-10-CM

## 2024-06-20 DIAGNOSIS — R00.2 PALPITATION: ICD-10-CM

## 2024-06-20 LAB
ALBUMIN SERPL-MCNC: 4.3 G/DL (ref 3.2–4.8)
ALBUMIN/GLOB SERPL: 1.4 {RATIO} (ref 1–2)
ALP LIVER SERPL-CCNC: 77 U/L
ALT SERPL-CCNC: 15 U/L
ANION GAP SERPL CALC-SCNC: 5 MMOL/L (ref 0–18)
AST SERPL-CCNC: 16 U/L (ref ?–34)
BILIRUB SERPL-MCNC: 0.4 MG/DL (ref 0.3–1.2)
BUN BLD-MCNC: 20 MG/DL (ref 9–23)
BUN/CREAT SERPL: 25.6 (ref 10–20)
CALCIUM BLD-MCNC: 8.9 MG/DL (ref 8.7–10.4)
CHLORIDE SERPL-SCNC: 109 MMOL/L (ref 98–112)
CO2 SERPL-SCNC: 27 MMOL/L (ref 21–32)
CREAT BLD-MCNC: 0.78 MG/DL
EGFRCR SERPLBLD CKD-EPI 2021: 100 ML/MIN/1.73M2 (ref 60–?)
FASTING STATUS PATIENT QL REPORTED: NO
GLOBULIN PLAS-MCNC: 3 G/DL (ref 2–3.5)
GLUCOSE BLD-MCNC: 98 MG/DL (ref 70–99)
MAGNESIUM SERPL-MCNC: 2.2 MG/DL (ref 1.6–2.6)
OSMOLALITY SERPL CALC.SUM OF ELEC: 295 MOSM/KG (ref 275–295)
POTASSIUM SERPL-SCNC: 4.9 MMOL/L (ref 3.5–5.1)
PROT SERPL-MCNC: 7.3 G/DL (ref 5.7–8.2)
SODIUM SERPL-SCNC: 141 MMOL/L (ref 136–145)
TSI SER-ACNC: 0.88 MIU/ML (ref 0.55–4.78)

## 2024-06-20 PROCEDURE — 84443 ASSAY THYROID STIM HORMONE: CPT

## 2024-06-20 PROCEDURE — 80053 COMPREHEN METABOLIC PANEL: CPT

## 2024-06-20 PROCEDURE — 83735 ASSAY OF MAGNESIUM: CPT

## 2024-06-20 PROCEDURE — 73140 X-RAY EXAM OF FINGER(S): CPT

## 2024-06-20 PROCEDURE — 36415 COLL VENOUS BLD VENIPUNCTURE: CPT

## 2024-07-02 ENCOUNTER — HOSPITAL ENCOUNTER (OUTPATIENT)
Dept: ULTRASOUND IMAGING | Age: 39
Discharge: HOME | End: 2024-07-02
Attending: FAMILY MEDICINE
Payer: COMMERCIAL

## 2024-07-02 DIAGNOSIS — E04.1 THYROID CYST: ICD-10-CM

## 2024-07-02 PROCEDURE — 76536 US EXAM OF HEAD AND NECK: CPT | Performed by: FAMILY MEDICINE

## 2024-07-08 ENCOUNTER — OFFICE VISIT (OUTPATIENT)
Dept: INTERNAL MEDICINE CLINIC | Facility: CLINIC | Age: 39
End: 2024-07-08
Payer: COMMERCIAL

## 2024-07-08 VITALS
SYSTOLIC BLOOD PRESSURE: 113 MMHG | OXYGEN SATURATION: 99 % | HEART RATE: 80 BPM | DIASTOLIC BLOOD PRESSURE: 72 MMHG | BODY MASS INDEX: 34.57 KG/M2 | WEIGHT: 222.88 LBS | HEIGHT: 67.5 IN | RESPIRATION RATE: 18 BRPM

## 2024-07-08 DIAGNOSIS — H66.004 RECURRENT ACUTE SUPPURATIVE OTITIS MEDIA OF RIGHT EAR WITHOUT SPONTANEOUS RUPTURE OF TYMPANIC MEMBRANE: ICD-10-CM

## 2024-07-08 DIAGNOSIS — Z00.00 ANNUAL PHYSICAL EXAM: Primary | ICD-10-CM

## 2024-07-08 DIAGNOSIS — R42 VERTIGO: ICD-10-CM

## 2024-07-08 DIAGNOSIS — E04.1 THYROID CYST: ICD-10-CM

## 2024-07-08 RX ORDER — AMOXICILLIN AND CLAVULANATE POTASSIUM 875; 125 MG/1; MG/1
1 TABLET, FILM COATED ORAL 2 TIMES DAILY
Qty: 20 TABLET | Refills: 0 | Status: SHIPPED | OUTPATIENT
Start: 2024-07-08 | End: 2024-07-18

## 2024-07-08 NOTE — PROGRESS NOTES
Renetta Milian is a 38 year old female.  Chief Complaint   Patient presents with    Physical     HPI:    Patient presented today for annual physical. She states that she has been having some veritgo for last one month. She had some increased congestion and allergies for a month and than felt like an ear infection, which slowly got better, but still feels congestion in her R ear with vertigo on position changes. No fever. No other complains   Has h/o thyroid cysts and gets ultrasounds done every two years. Most recent ultrasound shows benign cysts ( TIRADS 1)      Current Outpatient Medications   Medication Sig Dispense Refill    amoxicillin clavulanate 875-125 MG Oral Tab Take 1 tablet by mouth 2 (two) times daily for 10 days. 20 tablet 0    Cholecalciferol (VITAMIN D) 1000 units Oral Tab Take 1,000 Units by mouth daily.        meclizine 25 MG Oral Tab Take 1 tablet (25 mg total) by mouth 3 (three) times daily as needed. 30 tablet 0    pantoprazole 40 MG Oral Tab EC Take 1 tablet (40 mg total) by mouth every morning before breakfast. 90 tablet 0    magnesium 30 MG Oral Tab Take 1 tablet (30 mg total) by mouth 2 (two) times daily. (Patient not taking: Reported on 3/18/2024)      prenatal vitamin with DHA 27-0.8-228 MG Oral Cap Take 1 capsule by mouth daily. (Patient not taking: Reported on 2024)        Past Medical History:    Decorative tattoo    HPV in female    Thyroid cyst    goiter    Varicella    Had chicken pox during childhood      Past Surgical History:   Procedure Laterality Date    Colposcopy, cervix w/upper adjacent vagina; w/biopsy(s), cervix      Leep        Social History:  Social History     Socioeconomic History    Marital status:    Tobacco Use    Smoking status: Former     Current packs/day: 0.00     Average packs/day: 0.5 packs/day for 15.0 years (7.5 ttl pk-yrs)     Types: Cigarettes     Start date: 3/17/2002     Quit date: 3/17/2017     Years since quittin.3    Smokeless  tobacco: Never   Vaping Use    Vaping status: Never Used   Substance and Sexual Activity    Alcohol use: Not Currently     Comment: SOCIALLY    Drug use: No    Sexual activity: Yes     Partners: Male   Other Topics Concern    Caffeine Concern Yes     Comment: Coffee 1 cup daily    Pt has a pacemaker No    Pt has a defibrillator No    Reaction to local anesthetic No     Social Determinants of Health     Financial Resource Strain: Low Risk  (5/28/2023)    Financial Resource Strain     Difficulty of Paying Living Expenses: Not hard at all     Med Affordability: No   Food Insecurity: No Food Insecurity (5/28/2023)    Food Insecurity     Food Insecurity: Never true   Transportation Needs: No Transportation Needs (5/28/2023)    Transportation Needs     Lack of Transportation: No   Stress: No Stress Concern Present (5/28/2023)    Stress     Feeling of Stress : No   Housing Stability: Low Risk  (5/28/2023)    Housing Stability     Housing Instability: No      Family History   Problem Relation Age of Onset    Breast Cancer Paternal Aunt 60        unsure of age    Prostate Cancer Father       No Known Allergies     REVIEW OF SYSTEMS:   Review of Systems   Review of Systems   Constitutional: Negative for activity change, appetite change and fever.   HENT: Negative for congestion and voice change.    Respiratory: Negative for cough and shortness of breath.    Cardiovascular: Negative for chest pain.   Gastrointestinal: Negative for abdominal distention, abdominal pain and vomiting.   Genitourinary: Negative for hematuria.   Skin: Negative for wound.   Psychiatric/Behavioral: Negative for behavioral problems.   Wt Readings from Last 5 Encounters:   07/08/24 222 lb 14.4 oz (101.1 kg)   05/31/24 216 lb (98 kg)   03/18/24 211 lb 8 oz (95.9 kg)   02/12/24 209 lb (94.8 kg)   12/20/23 208 lb (94.3 kg)     Body mass index is 34.4 kg/m².      EXAM:   /72 (BP Location: Right arm, Patient Position: Sitting, Cuff Size: large)    Pulse 80   Resp 18   Ht 5' 7.5\" (1.715 m)   Wt 222 lb 14.4 oz (101.1 kg)   LMP 06/22/2024 (Approximate)   SpO2 99%   Breastfeeding Yes   BMI 34.40 kg/m²   Physical Exam   Constitutional:       Appearance: Normal appearance.   HENT:      Head: Normocephalic.   Eyes:      Conjunctiva/sclera: Conjunctivae normal.   Cardiovascular:      Rate and Rhythm: Normal rate and regular rhythm.      Heart sounds: Normal heart sounds. No murmur heard.  Pulmonary:      Effort: Pulmonary effort is normal.      Breath sounds: Normal breath sounds. No rhonchi or rales.   Abdominal:      General: Bowel sounds are normal.      Palpations: Abdomen is soft.      Tenderness: There is no abdominal tenderness.   Musculoskeletal:      Cervical back: Neck supple.      Right lower leg: No edema.      Left lower leg: No edema.   Skin:     General: Skin is warm and dry.   Neurological:      General: No focal deficit present.      Mental Status: He is alert and oriented to person, place, and time. Mental status is at baseline.   Psychiatric:         Mood and Affect: Mood normal.         Behavior: Behavior normal.       ASSESSMENT AND PLAN:   1. Annual physical exam  - continue with multivitamins  - recent blood work reviewed. Repeat in 6 months  - general diet and exercise counseling  - immunizations reviewed    2. Vertigo  - PHYSICAL THERAPY - INTERNAL    3. Recurrent acute suppurative otitis media of right ear without spontaneous rupture of tympanic membrane  - augmentin Bid for 10 days  - - tylenol as needed   - zyrtec as needed    4. Thyroid cyst  - ultrasound reviewed  - mulitple bilateral TIRADS 1 thyroid cysts  - repeat US in 2 years          The patient indicates understanding of these issues and agrees to the plan.  Follow up in 6 months    Myauri Buck MD

## 2024-07-23 NOTE — PROGRESS NOTES
VESTIBULAR EVALUATION:   Referring Physician: Heber  Diagnosis: Vertigo (R42)   Dizziness, BPPV     Date of Service: 7/24/2024   Insurance:  [facility] PPO      PATIENT SUMMARY   Renetta Milian is a 38 year old female who presents to therapy today with reports of positional dizziness, sinus congestion, and right ear pressure.      History/onset of current condition: Reports feeling \"creaking\" in the right ear. Has to be careful turning too fast because she feels \"spinning.\" Still experiences intermittent spinning with bending/standing but other times describes the dizziness as \"being on a boat.\"  Pt has seasonal allergies and has been experiencing more throat itching/dryness, sinus pressure, and ear pressure and fullness. Most recent onset of vertigo was 5/30/24 causing room spinning with position changes. The congestion and allergies have improved since then, though she continues to report congestion in the right ear and vertigo with position changes. She has a history of BPPV, chronic positional vertigo and recent middle ear infection. Feels like it never went away, and has had recurrent residual dizziness since her initial episodes in 2021. In 2022 had another episode of congestion and stuffiness with some dizziness.  Pt does have a current headache right now; + anxiety    Falls: none  Hx of migraines: pressure headaches near the frontal region.  Hx of vision issue: none  Hx of hearing issues: no changes in hearing but feels pressure and clogged up    Dizziness: Current 1/10, Best 0/10, Worst 5/10  Quality: spinning and/or dysequilibrium   Frequency/Duration: Spinning is brief in duration, nearly daily if exacerbated; Dysequilibrium brief in duration, daily  Aggravates: Supine to/from sit, Rolling, Bending move/sitting back up, Quick head movements and Looking/reaching up  Relieves: Not moving and Sitting down; focusing eyes on target or handheld support    Dizziness Handicap Inventory (DHI): 18/100     Current  functional limitations include difficulty picking up objects from the floor, difficulty and symptomatic with lying flat or rolling in bed, sit ups during workouts   Social history: active working out 5x a week, 3 kids, works full-time in accounting   Prior level of function: no limitations.  Pt goals include: Decrease dizziness  Past medical history was reviewed. Significant findings include  has a past medical history of Decorative tattoo, HPV in female, Thyroid cyst, and Varicella.     ASSESSMENT  Renetta Milian presents to physical therapy evaluation with primary c/o positional dizziness, sinus pressure, and right ear fullness. The results of the objective tests and measures show positive for right posterior canal BPPV. Pt was treated today with canalith repositioning for right posterior canal BPPV x2. Negative oculomotor exam, though pt reported residual dysequilibrium with VOR head turns, VOR cancellation, and after the head shaking nystagmus test. Balance testing to be done next session if needed. Functional deficits include but are not limited to difficulty with lying flat or rolling over in bed, difficulty picking up objects from floor, reaching up onto high shelves.  PT discussed evaluation findings, pathology and management of BPPV, plan of care with pt. Need to rule out vestibular migraine and/or PPPD. Pt. No home exercise program is appropriate at this time, though will likely benefit from gaze stabilization training, habituation training, and pt education in the upcoming sessions.  Skilled Physical Therapy is medically necessary to address the above impairments and reach functional goals.    Precautions:  None  OBJECTIVE:   Physical Exam:  Posture/Observation: good posture, no assistive device   Neuro Screen: Sensation: WNL for light touch screen, no reports of paresthesias.    Coordination Testing:   Finger to Nose: WNL   Pronation/supination: WNL   Toe tapping: WNL     Cervical spine ROM:  WNL  Adverse neuro signs with ROM: no     Oculomotor & Vestibular Exam:  Spontaneous Nystagmus: room light: negative;  fixation blocked: negative  Smooth Pursuit: Negative  Saccades: Negative  Gaze Evoked Nystagmus:  room light: Negative; fixation blocked: Negative  Head Thrust: Negative  VOR screen:  negative, symptom provocation (dysequilibrium) with head turns short in duration    VOR Cancellation: symptom provocation (dysequilibrium) very minimal during   Convergence: Negative  Cover/Uncover:  Negative  Cross Cover:  Negative  Head Shaking Nystagmus: negative, provocation symptoms (dysequilibrium) short in duration  Dynamic Visual Acuity:  Not Tested    Positional Testing:   [place]-Hallpike:   Right: positive right torsional upbeating nystagmus (mild), 5 sec latency, 20 sec duration, + symptoms (rebound symptoms upon sitting); repeated testing after clearing other canals- 10 sec latency, short duration, very mild symptoms; After CRM x1: very mild symptoms in position 1 (reports dysequilibrium after)   Left: negative  Roll Test (HC): negative right and left    Canalith repositioning maneuver for right posterior canal BPPV x 2; pt reported spinning in position 1 first CRM with dysequilibrium after the maneuver (Short duration); very minimal spinning and less symptoms reported in the first position of the 2nd CRM but felt dysequilibrium after (short duration)     Postural Control:   TBA as appropriate    Functional Mobility:   Gait: pt ambulates on level ground with normal mechanics.   Functional Gait Assessment (FGA): TBA as appropriate   Five Times Sit to Stand Test: TBA as appropriate     Today's Treatment and Response:   Pt education was provided on exam findings, treatment diagnosis, treatment plan, expectations, and prognosis. Pt was educated on causes of BPPV and other causes of the persistent positional vertigo and/or dysequilibrium, how anxiety can provoke dizziness, detrimental fear avoidance behaviors,  importance of remaining active and possible dizziness after evaluation.  No other home exercise program is appropriate at this time, but she will likely benefit from habituation and gaze stabilization training.     Charges: PT Eval Moderate Complexity, 1 CRM      Total Timed Treatment: 43 min     Total Treatment Time: 43 min     PLAN OF CARE:    Goals: (to be met in 8 visits)  1.  Negative [place]-Hallpike and roll tests.  2.  Able to perform position changes such as supine to/from sit and bending over to the floor without dizziness to improve safety in functional tasks.  3.  Pt will be able to ambulate with horizontal head turns for visual scanning without path deviation or dizziness to improve safety during gait.  4. Pt to have good understanding of difference between spinning vertigo vs dysequilibrium to differentiate BPPV vs persistent positional vertigo.  5. Pt will be independent with her HEP to promote compliance and self management of symptoms.     Frequency / Duration: Patient will be seen for 1-2 x/week or a total of 8 visits over a 90 day period. Treatment will include: home exercise program development and instruction, patient/family education, balance training, canalith repositioning maneuver and eye/head coordination exercises.     Education or treatment limitation: None   Rehab Potential: good     Patient/Family/Caregiver was advised of these findings, precautions, and treatment options and has agreed to actively participate in planning and for this course of care.     Thank you for your referral. Please co-sign or sign and return this letter via fax as soon as possible to [phone]. If you have any questions, please contact me at Dept: [phone].    Sincerely,    Henry Cuadra PT, DPT    Electronically signed by therapist: Henry Cuadra PT, SHAWNA  Physician's certification required: Yes  I certify the need for these services furnished under this plan of treatment and while under my  care.    X___________________________________________________ Date____________________    Certification From: 7/23/2024  To:10/21/2024

## 2024-07-24 ENCOUNTER — OFFICE VISIT (OUTPATIENT)
Dept: PHYSICAL THERAPY | Facility: HOSPITAL | Age: 39
End: 2024-07-24
Attending: INTERNAL MEDICINE
Payer: COMMERCIAL

## 2024-07-24 ENCOUNTER — TELEPHONE (OUTPATIENT)
Dept: PHYSICAL THERAPY | Facility: HOSPITAL | Age: 39
End: 2024-07-24

## 2024-07-24 DIAGNOSIS — R42 VERTIGO: Primary | ICD-10-CM

## 2024-07-24 PROCEDURE — 95992 CANALITH REPOSITIONING PROC: CPT

## 2024-07-24 PROCEDURE — 97162 PT EVAL MOD COMPLEX 30 MIN: CPT

## 2024-07-30 ENCOUNTER — OFFICE VISIT (OUTPATIENT)
Dept: PHYSICAL THERAPY | Facility: HOSPITAL | Age: 39
End: 2024-07-30
Attending: INTERNAL MEDICINE
Payer: COMMERCIAL

## 2024-07-30 PROCEDURE — 97112 NEUROMUSCULAR REEDUCATION: CPT

## 2024-07-30 NOTE — PROGRESS NOTES
Diagnosis: Vertigo (R42)   Dizziness, BPPV      Insurance: [facility] PPO  Visit (# authorized):  no auth, 8 per POC                         Referring Provider: Heber    Precautions: None    Pain: Pt not being treated for pain    Subjective: Felt good day of evaluation, then after that returned to about baseline with \"feeling of being on a boat\". Feels more \"off\" when move active, like cleaning her daughters room.   Dizziness : 0/10, 4/10 at worst   Dizziness Handicap Inventory (DHI): 18/100     Objective:    Date  7/30/24           Visit Number  2/8          NMR x          Ther EX           Ther Act           Gait Training           CRM            Manual             Additional Treatment Information:   Positional Testing:   Absent gaze evoked nystagmus in fixation blocked  [place] Hallpike: R negative; L negative  Roll Test: B negative    NMR: 25'  Positional Testing  Dynamic Visual Acuity: 1 line degradation, no symptoms   Ambulation with head turn: vertical/horizontal 2x40ft (sx reproduction w/ vertical)   Grounding, EC weight shift 3x30s  Grounding, Wall squat, EC x12   VOR cancellation with ambulation horizontal/vertical 3x30ft     Patient Education:  Education provided on test findings, HEP given and educated on grounding techniques.   Access Code: [email or web address]  URL: [email or web address]  Date: 07/30/2024  Prepared by: Jemma Toro    Exercises  - Forward Walking Horizontal/vertical VOR Cancellation  - 1 x daily - 7 x weekly - 10 reps  - Standing Balance with Eyes Closed  - 1 x daily - 7 x weekly - 3 sets - 30 second hold  - Wall Squat  - 1 x daily - 7 x weekly - 2 sets - 10 reps     Assessment:   Negative positional testing in session and DVA negative in session suggesting adequate gaze stability. Given grounding techniques and VOR cancellation for HEP to manage residual sx, pt performs well without questions.     Goals: (to be met in 8 visits)  1. Negative [place]-Hallpike and roll tests.  2. Able to  perform position changes such as supine to/from sit and bending over to the floor without dizziness to improve safety in functional tasks.  3. Pt will be able to ambulate with horizontal head turns for visual scanning without path deviation or dizziness to improve safety during gait.  4. Pt to have good understanding of difference between spinning vertigo vs dysequilibrium to differentiate BPPV vs persistent positional vertigo.  5. Pt will be independent with her HEP to promote compliance and self management of symptoms.     Plan: Modified motion sensitivity, Grounding, habituation    Charges: 2 NMR       Total Timed Treatment: 30'  min  Total Treatment Time: 30'  min    21st markedup Cures Act Notice to Patient: Medical documents like this are made available to patients in the interest of transparency. However, be advised this is a medical document and it is intended as [id number] communication between your medical providers. This medical document may contain abbreviations, assessments, medical data, and results or other terms that are unfamiliar. Medical documents are intended to carry relevant information, facts as evident, and the clinical opinion of the practitioner. As such, this medical document may be written in language that appears blunt or direct. You are encouraged to contact your medical provider and/or [facility] Patient Experience if you have any questions about this medical document.

## 2024-07-31 ENCOUNTER — OFFICE VISIT (OUTPATIENT)
Dept: OTOLARYNGOLOGY | Facility: CLINIC | Age: 39
End: 2024-07-31
Payer: COMMERCIAL

## 2024-07-31 ENCOUNTER — OFFICE VISIT (OUTPATIENT)
Dept: AUDIOLOGY | Facility: CLINIC | Age: 39
End: 2024-07-31

## 2024-07-31 DIAGNOSIS — R42 VERTIGO: Primary | ICD-10-CM

## 2024-07-31 DIAGNOSIS — H93.8X1 SENSATION OF FULLNESS IN RIGHT EAR: Primary | ICD-10-CM

## 2024-07-31 PROCEDURE — 99214 OFFICE O/P EST MOD 30 MIN: CPT | Performed by: OTOLARYNGOLOGY

## 2024-07-31 PROCEDURE — 92557 COMPREHENSIVE HEARING TEST: CPT | Performed by: AUDIOLOGIST

## 2024-07-31 PROCEDURE — 92567 TYMPANOMETRY: CPT | Performed by: AUDIOLOGIST

## 2024-07-31 NOTE — PROGRESS NOTES
NEW PATIENT PROGRESS NOTE  OTOLOGY/OTOLARYNGOLOGY    REF MD:  No referring provider defined for this encounter.    PCP: Mayuri Buck MD    CHIEF COMPLAINT:    Chief Complaint   Patient presents with    Sinus Problem     Right ear fullness, has been for about end of may.        HISTORY OF PRESENT ILLNESS: Renetta Milian is a 38 year old female who presents for evaluation of positional dizziness, sinus congestion, and right ear fullness and pressure. On 7/8/24, she saw her PCP with ongoing congestion in right ear and vertigo while shifting positions. Patient was recommended PT for the vertigo, and she was started on a 10-day course of Augmentin right acute suppurative otitis media.. She has a history of BPPV and chronic positional vertigo and has had recurrent residual dizziness since her initial episodes in 2021.  More recently, she has been experiencing vertigo for approximately the past 2 months, with the most recent onset of vertigo was 5/30/24 causing room spinning with position changes. States that this \"spinning\" sensation can occur when turning too quickly. Has occasional spinning while standing or bending, but at other times describes the dizziness as \"being on a boat.\"  One month prior to recent onset, patient has also been experiencing increased sinus pressure, ear pressure and fullness, and throat irritation and dryness due to seasonal allergies.  After this initial flare up, the allergies and congestion have subsided but she still complains of right ear congestion and vertigo when changing positions. Patient started PT on 7/24 and patient was positive for right posterior canal BPPV, with a need to rule out vestibular migraine and/or PPPD.      Once or twice a month pressure headache. Migraine in pregnancy. No family history of migraine, vertigo, hearing loss. Dad with tinnitus. No hx of ear infections, no hx of ear tubes, no otorrhea, no otalgia. Has been treated twice for BPPV since 2021. Patient  is breastfeeding.    PAST MEDICAL HISTORY:    Past Medical History:    Decorative tattoo    HPV in female    Thyroid cyst    goiter    Varicella    Had chicken pox during childhood       PAST SURGICAL HISTORY:    Past Surgical History:   Procedure Laterality Date    Colposcopy, cervix w/upper adjacent vagina; w/biopsy(s), cervix      Leep         Current Outpatient Medications on File Prior to Visit   Medication Sig Dispense Refill    Cholecalciferol (VITAMIN D) 1000 units Oral Tab Take 1,000 Units by mouth daily.        meclizine 25 MG Oral Tab Take 1 tablet (25 mg total) by mouth 3 (three) times daily as needed. 30 tablet 0    pantoprazole 40 MG Oral Tab EC Take 1 tablet (40 mg total) by mouth every morning before breakfast. 90 tablet 0    magnesium 30 MG Oral Tab Take 1 tablet (30 mg total) by mouth 2 (two) times daily. (Patient not taking: Reported on 3/18/2024)      prenatal vitamin with DHA 27-0.8-228 MG Oral Cap Take 1 capsule by mouth daily. (Patient not taking: Reported on 2024)       No current facility-administered medications on file prior to visit.       Allergies: No Known Allergies    SOCIAL HISTORY:    Social History     Tobacco Use    Smoking status: Former     Current packs/day: 0.00     Average packs/day: 0.5 packs/day for 15.0 years (7.5 ttl pk-yrs)     Types: Cigarettes     Start date: 3/17/2002     Quit date: 3/17/2017     Years since quittin.4    Smokeless tobacco: Never   Substance Use Topics    Alcohol use: Not Currently     Comment: SOCIALLY       FAMILY HISTORY: Denies known family history of hearing loss, tinnitus, vertigo, or migraine.  Denies known family history of head and neck cancer, thyroid cancer, bleeding disorders.     REVIEW OF SYSTEMS:   Positives are in bold  Neuro: Headache, facial weakness, facial numbness, neck pain, vertigo  ENT: Hearing change, tinnitus, otorrhea, otalgia, aural fullness, ear pressure, vertigo, imbalance  Sinus pressure, rhinorrhea,  congestion, facial pain, jaw pain, dysphagia, odynophagia, sore throat, voice changes, shortness of breath    EXAMINATION:  I washed my hands with an alcohol-based hand gel prior to examination  Constitutional:   --Vitals: Last menstrual period 06/22/2024, currently breastfeeding.  General: no apparent distress, well-developed, conversant  Neuro: Cranial nerves: EOMI, Facial sensation intact to touch, palate elevates midline, tongue protrudes midline, shoulder shrug intact bilateral, facial movement normal bilateral  Respiratory: No stridor, stertor or increased work of breathing  ENT:  --Nose: no external nasal deformity, anterior rhinoscopy: Septum midline, no inferior turbinate hypertrophy, mucosa healthy, no rhinorrhea  --Neck: No palpable cervical lymphadenopathy, no thyromegaly, no masses or lesions over the bilateral submandibular or parotid glands  --Ear: (bilateral ears were examined under binocular microscopy)  Right ear microscopic exam:  Pinna: Normal, no lesions or masses.  Mastoid: Nontender on palpation.   External auditory canal: Clear, no masses or lesions.  Tympanic membrane: Intact, no lesions, normal landmarks.  Middle ear: Aerated.    Left ear microscopic exam:  Pinna: Normal, no lesions or masses.  Mastoid: Nontender on palpation.   External auditory canal: Clear, no masses or lesions.  Tympanic membrane: Intact, no lesions, normal landmarks.  Middle ear: Aerated.    Vestibular examination (7/31/2024):   Hallpike right: negative, Hallpike left: negative      Latest Audiogram Result (Hz) Exam performed: 7/31/2024 1:38 PM Last edited by Holli Barker Au.D on 7/31/2024 1:42 PM        125 250  1500 2000 3000 4000 6000 8000    Right air:  10 5  5  5  5  0    Left air:  10 10  10  5  0  5       Reliability:  Fair    Transducer:  Inserts    Technique:  Conventional Audiometry    Comments:            Latest Speech Audiometry  Last edited by Holli Barker Au.D on 7/31/2024 1:42 PM        Ear Method PTA SAT SRT [facility] Test/list Score (%) Intensity Mask/noise Notes    right live voice   10   10 By Difficulty 100 55      left live voice   10   10 By Difficulty 100 55                    Latest Tympanogram Result       Probe Tone (Hz): 226 Exam performed: 7/31/2024 1:39 PM Last edited by Holli Barker Au.D on 7/31/2024 1:42 PM      Tympanograms  These were drawn by a user, not generated from device data      Right Ear Left Ear                     Right Ear Left Ear    Tympanogram type: Type A Type A    Canal volume (mL): 1.4 1.2    Peak pressure (daPa): 4 0    Peak amplitude (mL): 0.53 0.65    Tympanogram width (daPa):        Comments:                    Latest Audiogram and Tympanogram Result Text  Last edited by Holli Barker Au.D on 7/31/2024  1:42 PM      Study Result                 Narrative & Impression    Otoscopic inspection: right ear no cerumen; left ear no cerumen.       Tests/Procedures  Patient was tested via  standard insert earphones and a bone oscillator standard audiometry     Audiometric Results (Pure Tone Results)    Audiometric thresholds indicate hearing is within normal limits in both ears     Immitance Test Results    A tympanogram was performed  Tympanometry suggests normal findings bilaterally.    Follow up with Austen Stover M.D..  Audiological monitoring as needed during the course of medical management.                       ASSESSMENT/PLAN:  Renetta Milian is a 38 year old female with     ICD-10-CM   1. Vertigo  R42        IMPRESSION:  Normal bilateral eustachian tube function, normal hearing  Possible vestibular migraine - vertigo and ear fullness   No evidence of BPPV today    PLAN:  -Audiogram reviewed with patient   -Discussed most migraine preventative medications are compatible with breastfeeding but would require further risk/benefit discussion. Discussed that hormonal changes can contribute to migraine symptoms.  -Lifestyle changes including stress  reduction, migraine diet (caffeine cessation), sleep hygiene, hydration, regular meals discussed  -Patient education: Migraine: More than a Headache  -Symptom diary  -Follow-up as needed if further treatment desired    Situation reviewed with the patient in detail.    Austen Stover MD  Otology/Otolaryngology  [facility] [address] [address]  Phone [phone]  Fax [phone]

## 2024-08-03 ENCOUNTER — HOSPITAL ENCOUNTER (OUTPATIENT)
Age: 39
Discharge: HOME | End: 2024-08-03
Payer: COMMERCIAL

## 2024-08-03 VITALS
HEART RATE: 72 BPM | RESPIRATION RATE: 16 BRPM | TEMPERATURE: 97 F | DIASTOLIC BLOOD PRESSURE: 69 MMHG | SYSTOLIC BLOOD PRESSURE: 111 MMHG | OXYGEN SATURATION: 100 %

## 2024-08-03 DIAGNOSIS — S40.861A INSECT BITE OF RIGHT UPPER ARM, INITIAL ENCOUNTER: Primary | ICD-10-CM
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
## 2024-08-03 PROCEDURE — 99213 OFFICE O/P EST LOW 20 MIN: CPT

## 2024-08-03 PROCEDURE — 99214 OFFICE O/P EST MOD 30 MIN: CPT

## 2024-08-03 RX ORDER — CEPHALEXIN 500 MG/1
500 CAPSULE ORAL 2 TIMES DAILY
Qty: 14 CAPSULE | Refills: 0 | Status: SHIPPED | OUTPATIENT
Start: 2024-08-03 | End: 2024-08-10

## 2024-08-03 NOTE — DISCHARGE INSTRUCTIONS
Please use hydrocortisone ointment 2-3 times a day, I would also apply ice to the area.  Any increase in surrounding redness or warmth fever please start antibiotics.

## 2024-08-03 NOTE — ED PROVIDER NOTES
Patient Seen in: [facility] [place]      History     Chief Complaint   Patient presents with    Skin     Stated Complaint: bug bite  Subjective:   HPI    This is a well-appearing 38-year-old female who presents with a chief complaint of a presumed insect bite to the right upper arm which occurred 4 days ago.  Patient states that now with increase in erythema and pruritus to the area.  No fever or chills.    Objective:   Past Medical History:    Decorative tattoo    HPV in female    Thyroid cyst    goiter    Varicella    Had chicken pox during childhood            Past Surgical History:   Procedure Laterality Date    Colposcopy, cervix w/upper adjacent vagina; w/biopsy(s), cervix      Leep                Social History     Socioeconomic History    Marital status:    Tobacco Use    Smoking status: Former     Current packs/day: 0.00     Average packs/day: 0.5 packs/day for 15.0 years (7.5 ttl pk-yrs)     Types: Cigarettes     Start date: 3/17/2002     Quit date: 3/17/2017     Years since quittin.3    Smokeless tobacco: Never   Vaping Use    Vaping status: Never Used   Substance and Sexual Activity    Alcohol use: Not Currently     Comment: SOCIALLY    Drug use: No    Sexual activity: Yes     Partners: Male   Other Topics Concern    Caffeine Concern Yes     Comment: Coffee 1 cup daily    Pt has a pacemaker No    Pt has a defibrillator No    Reaction to local anesthetic No     Social Determinants of Health     Financial Resource Strain: Low Risk  (2023)    Financial Resource Strain     Difficulty of Paying Living Expenses: Not hard at all     Med Affordability: No   Food Insecurity: No Food Insecurity (2023)    Food Insecurity     Food Insecurity: Never true   Transportation Needs: No Transportation Needs (2023)    Transportation Needs     Lack of Transportation: No   Stress: No Stress Concern Present (2023)    Stress     Feeling of Stress : No   Housing Stability: Low Risk   (5/28/2023)    Housing Stability     Housing Instability: No            Review of Systems   All other systems reviewed and are negative.      Positive for stated complaint: Skin    Other systems are as noted in HPI.  Constitutional and vital signs reviewed.      All other systems reviewed and negative except as noted above.    Physical Exam     ED Triage Vitals [08/03/24 0947]   /69   Pulse 72   Resp 16   Temp 97.3 °F (36.3 °C)   Temp src Temporal   SpO2 100 %   O2 Device None (Room air)     Current:/69   Pulse 72   Temp 97.3 °F (36.3 °C) (Temporal)   Resp 16   LMP 07/22/2024 (Exact Date)   SpO2 100%     Physical Exam  Vitals and nursing note reviewed.   Constitutional:       General: She is awake. She is not in acute distress.     Appearance: Normal appearance. She is not ill-appearing, toxic-appearing or diaphoretic.   HENT:      Head: Normocephalic and atraumatic.      Right Ear: Tympanic membrane, ear canal and external ear normal.      Left Ear: Tympanic membrane, ear canal and external ear normal.      Nose: Nose normal.      Mouth/Throat:      Mouth: Mucous membranes are moist.      Pharynx: Oropharynx is clear. Uvula midline.   Eyes:      General: Lids are normal.      Extraocular Movements: Extraocular movements intact.      Conjunctiva/sclera: Conjunctivae normal.      Pupils: Pupils are equal, round, and reactive to light.   Cardiovascular:      Rate and Rhythm: Normal rate and regular rhythm.      Pulses: Normal pulses.      Heart sounds: Normal heart sounds.   Pulmonary:      Effort: Pulmonary effort is normal.      Breath sounds: Normal breath sounds.   Musculoskeletal:        Arms:       Comments: + papule, +surrounding erythema and warmth. No induration or fluctuance.    Skin:     General: Skin is warm and dry.      Capillary Refill: Capillary refill takes less than 2 seconds.   Neurological:      General: No focal deficit present.      Mental Status: She is alert and oriented to  person, place, and time.   Psychiatric:         Mood and Affect: Mood normal.         Behavior: Behavior normal. Behavior is cooperative.         Thought Content: Thought content normal.         Judgment: Judgment normal.         ED Course     No results found.  Labs Reviewed - No data to display    MDM     Medical Decision Making  Differential diagnoses reflecting the complexity of care include but are not limited to localized reaction to insect bite, cellulitis.    Comorbidities that add complexity to management include: N/A  History obtained by an independent source was from: N/A  Discussions of management was done with: N/A  My independent interpretations of studies include: N/A  Shared decision making was done by: Patient and myself  Patient is well appearing, non-toxic and in no acute distress.  Vital signs are stable.  Patient in no distress, I did discuss with the patient will treat with topical steroid.  Wait-and-see antibiotic discussed but I encouraged her to hold it.  I would continue to just use ice to the area, hydrocortisone ointment.  If any surrounding redness, streaking or warmth then she may start the antibiotic.  Patient verbalized plan of care and states understanding.    Risk  OTC drugs.  Prescription drug management.        Disposition and Plan     Clinical Impression:  1. Insect bite of right upper arm, initial encounter         Disposition:  Discharge  8/3/2024 10:02 am    Follow-up:  Mayuri Buck MD  [address] [place] [address]  [phone]                Medications Prescribed:  Discharge Medication List as of 8/3/2024 10:04 AM        START taking these medications    Details   hydrocortisone 2.5 % External Cream Apply 1 Application topically 2 (two) times daily for 7 days., Normal, Disp-3.5 g, R-0      cephalexin 500 MG Oral Cap Take 1 capsule (500 mg total) by mouth 2 (two) times daily for 7 days., Normal, Disp-14 capsule, R-0                Note to patient: The  21st Century cares act makes medical notes like these available to patients in the interest of transparency.  However, be advised this medical document and is intended as peer to peer communication.  It is read the medical language and may contain abbreviations or verbiage that are unfamiliar.  It may appear blunt or direct.  Medical documents are intended to carry relevant information, fax is evident and the clinical opinion of the practitioner.    This note was prepared using Dragon Medical voice recognition dictation software.  As a result, errors may occur.  When identified, these errors have been corrected.  While every attempt is made to correct errors during dictation, discrepancies may still exist.    Lilli Salmeron, APRN  8/3/2024  10:01 AM

## 2024-08-03 NOTE — ED INITIAL ASSESSMENT (HPI)
States she was bitten by a mosquito 4 days ago on the right upper arm. Now with increased redness to area. + itching.

## 2024-08-07 NOTE — PROGRESS NOTES
Diagnosis: Vertigo (R42)   Dizziness, BPPV      Insurance: [facility] PPO  Visit (# authorized):  no auth, 8 per POC                         Referring Provider: Heber    Precautions: None    Pain: Pt not being treated for pain    Subjective: Pt has been feeling okay, but had a few episodes of the off feeling. Has been doing them every other day.  Was able to lay on her back during the classes which she hasn't been able to do in a long time  Dizziness : 0/10, 4/10 at worst   Dizziness Handicap Inventory (DHI): 18/100     Objective:    Date  7/30/24 8/8/24         Visit Number  2/8 3/8         NMR x x         Ther EX           Ther Act           Gait Training           CRM            Manual             Additional Treatment Information:     NMR: 43'  Habituation/grounding:   - Forward bends x10  - Sit to R SL; R SL to sit x4   - R SL to Supine; Supine to R SL x4  - Sit to supine (R SL) x2  - Supine to long sit; long sit to supine x4  - Supine to prone; prone to supine x2    7/30/24- Dynamic Visual Acuity: 1 line degradation, no symptoms     Patient Education:  Education provided on test findings, HEP given and educated on grounding techniques.   Migraine Education:  Symptoms: headache, head pressure; nausea/vomiting, intermittent episodes of dizziness, persistent lightheadedness; pain at back of neck, base of skull, and/or radiating into neck; changes in vision (difficulty focusing, blurred vision); ocular migraines (sparkles, dots, lightning bolts); sound, light, and/or motion sensitivity; sinus or facial pressure; ear pain, fullness, pressure, ringing.     Triggers: stress, dehydration, varying sleep patterns, inadequate amount of sleep, medications, hormone cycles, seasonal changes and barometric pressure changes, bright lights, loud sounds, certain smells, smoking/nicotine use, caffeine and other foods    Tips to control: Get 7-8 hours of sleep each night, maintain sleep/wake schedule, plenty of water, avoid caffeine,  eat at regular internvals (every 3-4 hours), exercise regularly       HEP Access Code: [email or web address]  Date: 07/30/2024  Exercises  - Forward Walking Horizontal/vertical VOR Cancellation  - 1 x daily - 7 x weekly - 10 reps  - Standing Balance with Eyes Closed  - 1 x daily - 7 x weekly - 3 sets - 30 second hold  - Wall Squat  - 1 x daily - 7 x weekly - 2 sets - 10 reps     Assessment:  Session focused on habituation training to promote desensitization and reduce positional dizziness/dysequilibrium. Within 3-4 reps, pt was able to perform different transfers with less dizziness or decreased duration of symptoms. Encouraged continued habituation techniques throughout her day and will continue to progress functional activities as able.     Goals: (to be met in 8 visits)  1. Negative Irish-Hallpike and roll tests.  2. Able to perform position changes such as supine to/from sit and bending over to the floor without dizziness to improve safety in functional tasks.  3. Pt will be able to ambulate with horizontal head turns for visual scanning without path deviation or dizziness to improve safety during gait.  4. Pt to have good understanding of difference between spinning vertigo vs dysequilibrium to differentiate BPPV vs persistent positional vertigo.  5. Pt will be independent with her HEP to promote compliance and self management of symptoms.     Plan: Modified motion sensitivity, Grounding, habituation    Charges: 3 NMR       Total Timed Treatment: 43 min  Total Treatment Time: 43  min    21st Century Cures Act Notice to Patient: Medical documents like this are made available to patients in the interest of transparency. However, be advised this is a medical document and it is intended as [id number] communication between your medical providers. This medical document may contain abbreviations, assessments, medical data, and results or other terms that are unfamiliar. Medical documents are intended to carry relevant information,  facts as evident, and the clinical opinion of the practitioner. As such, this medical document may be written in language that appears blunt or direct. You are encouraged to contact your medical provider and/or [facility] Patient Experience if you have any questions about this medical document.

## 2024-08-08 ENCOUNTER — OFFICE VISIT (OUTPATIENT)
Dept: PHYSICAL THERAPY | Facility: HOSPITAL | Age: 39
End: 2024-08-08
Attending: INTERNAL MEDICINE
Payer: COMMERCIAL

## 2024-08-08 PROCEDURE — 97112 NEUROMUSCULAR REEDUCATION: CPT

## 2024-08-16 NOTE — PROGRESS NOTES
Diagnosis: Vertigo (R42)   Dizziness, BPPV      Insurance: [facility] PPO  Visit (# authorized):  no auth, 8 per POC                         Referring Provider: Heber    Precautions: None    Pain: Pt not being treated for pain    Subjective: Pt had a massage over the weekend, had no dizziness when changing positions and was able to stay in supine for a long time. Pt woke up this morning feeling very groggy and a little bit of dizziness. No room spinning dizziness, but felt more than the usual \"off feeling\" Says it was about a 5-6/10 this morning.  Dizziness : 0/10, worst 2/10   Dizziness Handicap Inventory (DHI): 18/100     Objective:    Date  7/30/24 8/8/24 8/19/24        Visit Number  2/8 3/8 4/8        NMR x x x        Ther EX           Ther Act   x        Gait Training           CRM            Manual             Additional Treatment Information:     TA x10'  Re-assessing:   No spontaneous or gaze evoked nystagmus  Cammal Hallpike: negative, no symptoms bilaterally  Supine Roll Test: negative, no symptoms bilaterally    NMR: 35'  VOR x1 near target, narrow JASON on level surface, vertical and horizontal 3x30\" (horizontal more bothersome)  VOR x1 distant target, narrow JASON on level surface, vertical and horizontal 3x30\"   Paloff press double YTB, narrow JASON, EO, x15 bilat  Paloff press double YTB, normal JASON, EC, x10 bilat  Wall squat with SB EO x10; EC x10  VOR x1 near target, fwd walking, vertical and horizontal x4 laps  VOR x1 distant target, fwd walking, vertical and horizontal x4 laps  Walking with head turns x2 laps  Walking with head nods x2 laps  HEP update    7/30/24- Dynamic Visual Acuity: 1 line degradation, no symptoms     Patient Education:  Education provided on test findings, HEP reviewed and educated on grounding techniques.   Migraine Education:  Symptoms: headache, head pressure; nausea/vomiting, intermittent episodes of dizziness, persistent lightheadedness; pain at back of neck, base of skull, and/or  radiating into neck; changes in vision (difficulty focusing, blurred vision); ocular migraines (sparkles, dots, lightning bolts); sound, light, and/or motion sensitivity; sinus or facial pressure; ear pain, fullness, pressure, ringing.     Triggers: stress, dehydration, varying sleep patterns, inadequate amount of sleep, medications, hormone cycles, seasonal changes and barometric pressure changes, bright lights, loud sounds, certain smells, smoking/nicotine use, caffeine and other foods    Tips to control: Get 7-8 hours of sleep each night, maintain sleep/wake schedule, plenty of water, avoid caffeine, eat at regular internvals (every 3-4 hours), exercise regularly     Date: 07/30/2024  - Forward Walking Horizontal/vertical VOR Cancellation  - 1 x daily - 7 x weekly - 10 reps  - Standing Balance with Eyes Closed  - 1 x daily - 7 x weekly - 3 sets - 30 second hold  - Wall Squat  - 1 x daily - 7 x weekly - 2 sets - 10 reps    Date: 8/19/24  - Walking Gaze Stabilization Head Rotation  - 1 x daily - 7 x weekly - 4-5 laps   - Gaze Stability (VOR) x1 Forward Ambulation With Distant Target With Horizontal Head Turns  - 1 x daily - 7 x weekly - 4-5 laps     Assessment:  Reassessed positional testing which was negative. Reminded pt of the difference between vertigo caused by BPPV vs vestibular migraine symptoms. Utilized VORx1 in various exercises to promote use of grounding and for habituation training. Pt's physician believes her migraines may be triggered by hormonal imbalances from breastfeeding therefore pt does not want to consider a daily migraine management medication until hormones are balanced to assess need for medication.    Goals: (to be met in 8 visits)  1. Negative Irish-Hallpike and roll tests.- met 8/19/24  2. Able to perform position changes such as supine to/from sit and bending over to the floor without dizziness to improve safety in functional tasks.  3. Pt will be able to ambulate with horizontal head  turns for visual scanning without path deviation or dizziness to improve safety during gait.  4. Pt to have good understanding of difference between spinning vertigo vs dysequilibrium to differentiate BPPV vs persistent positional vertigo.  5. Pt will be independent with her HEP to promote compliance and self management of symptoms.     Plan: Modified motion sensitivity, Grounding, habituation    Charges: 2 NMR   1 TA    Total Timed Treatment: 45 min  Total Treatment Time: 45  min    21st Century Cures Act Notice to Patient: Medical documents like this are made available to patients in the interest of transparency. However, be advised this is a medical document and it is intended as [id number] communication between your medical providers. This medical document may contain abbreviations, assessments, medical data, and results or other terms that are unfamiliar. Medical documents are intended to carry relevant information, facts as evident, and the clinical opinion of the practitioner. As such, this medical document may be written in language that appears blunt or direct. You are encouraged to contact your medical provider and/or [facility] Patient Experience if you have any questions about this medical document.

## 2024-08-19 ENCOUNTER — OFFICE VISIT (OUTPATIENT)
Dept: PHYSICAL THERAPY | Facility: HOSPITAL | Age: 39
End: 2024-08-19
Attending: INTERNAL MEDICINE
Payer: COMMERCIAL

## 2024-08-19 PROCEDURE — 97530 THERAPEUTIC ACTIVITIES: CPT

## 2024-08-19 PROCEDURE — 97112 NEUROMUSCULAR REEDUCATION: CPT

## 2024-08-27 NOTE — PROGRESS NOTES
Diagnosis: Vertigo (R42)   Dizziness, BPPV      Insurance: [facility] PPO  Visit (# authorized):  no auth, 8 per POC                         Referring Provider: Heber    Precautions: None    Pain: Pt not being treated for pain    Subjective: Pt said she's had a bad week. Her kids were sick and she felt sick. She's had a migraine for the past 6 days straight. She has increased pressure today. Last week felt more groggy but does not feel that as much now. Pt says she felt 1-2/10 dizziness when walking. Feels like a boat but there is nothing intense.   Dizziness : 0/10, worst 2/10   Head pain: 1/10   Dizziness Handicap Inventory (DHI): 18/100     Objective:    Date  7/30/24 8/8/24 8/19/24 8/28/24       Visit Number  2/8 3/8 4/8 5/8       NMR x x x x       Ther EX           Ther Act   x x       Gait Training           CRM            Manual             Additional Treatment Information:     TA x8'  Nustep lvl 4 arms and legs x8'     NMR: 35'  Migraine management education  Pt edu: PPPD criteria   VOR x1 horizontal and vertical, near target while walking x4 laps  VOR x1  with visually complex image ( board with green smiley face) horizontal and vertical, distant target 3x30\" ea   VOR cancellation while walking with tennis ball toss x2 laps    7/30/24- Dynamic Visual Acuity: 1 line degradation, no symptoms     Patient Education:  Education provided on test findings, HEP reviewed and educated on grounding techniques.   Migraine Education:  Symptoms: headache, head pressure; nausea/vomiting, intermittent episodes of dizziness, persistent lightheadedness; pain at back of neck, base of skull, and/or radiating into neck; changes in vision (difficulty focusing, blurred vision); ocular migraines (sparkles, dots, lightning bolts); sound, light, and/or motion sensitivity; sinus or facial pressure; ear pain, fullness, pressure, ringing.     Benefits of rescue med    Persistent Postural Perceptual Dizziness (PPPD) Criteria according  to ICVD:  A. One of more symptoms of dizziness, unsteadiness, or nonspinning vertigo are present on most days for 3mo or more  Symptoms last for prolonged periods of time but may wax and wane in severity  Symptoms need not be present continuously throughout the entire day  B. Persistent symptoms occur without specific provocation, but are exacerbated by three factors:  Upright posture  Active or passive motion without regard to direction or position  Exposure to moving visual stimuli or complex visual patterns  C. The disorder is precipitated by conditions that cause vertigo, unsteadiness, dizziness, or problems with balance including acute, episodic, or chronic vestibular syndromes; other neurologic or medical illnesses; or psychological distress  D. Symptoms cause significant distress or functional impairment  E. Symptoms are not better accounted for by another disease or disorder     Date: 07/30/2024  - Forward Walking Horizontal/vertical VOR Cancellation  - 1 x daily - 7 x weekly - 10 reps  - Standing Balance with Eyes Closed  - 1 x daily - 7 x weekly - 3 sets - 30 second hold  - Wall Squat  - 1 x daily - 7 x weekly - 2 sets - 10 reps    Date: 8/19/24  - Walking Gaze Stabilization Head Rotation  - 1 x daily - 7 x weekly - 4-5 laps   - Gaze Stability (VOR) x1 Forward Ambulation With Distant Target With Horizontal Head Turns  - 1 x daily - 7 x weekly - 4-5 laps    Date: 8/28/24  - Walking with tennis ball throw  - VOR x1 with  board     Assessment:  Discussed benefits of using a rescue med for migraine management rather than a daily medication, pt is open to the idea therefore encouraged her to discuss this with her doctor. Focused on gaze stabilization with visually complex environments, as pt reports minimal positional dizziness. She does continue to feel some dizziness with supine to sitting but is much better than before.     Goals: (to be met in 8 visits)  1. Negative Irish-Hallpike and roll tests.- met  8/19/24  2. Able to perform position changes such as supine to/from sit and bending over to the floor without dizziness to improve safety in functional tasks.  3. Pt will be able to ambulate with horizontal head turns for visual scanning without path deviation or dizziness to improve safety during gait.  4. Pt to have good understanding of difference between spinning vertigo vs dysequilibrium to differentiate BPPV vs persistent positional vertigo.  5. Pt will be independent with her HEP to promote compliance and self management of symptoms.     Plan: Modified motion sensitivity, Grounding, habituation    Charges: 2 NMR  1 TA    Total Timed Treatment: 43 min  Total Treatment Time: 43  min    [employer]st [employer] Cures Act Notice to Patient: Medical documents like this are made available to patients in the interest of transparency. However, be advised this is a medical document and it is intended as [id number] communication between your medical providers. This medical document may contain abbreviations, assessments, medical data, and results or other terms that are unfamiliar. Medical documents are intended to carry relevant information, facts as evident, and the clinical opinion of the practitioner. As such, this medical document may be written in language that appears blunt or direct. You are encouraged to contact your medical provider and/or [facility] Patient Experience if you have any questions about this medical document.

## 2024-08-28 ENCOUNTER — OFFICE VISIT (OUTPATIENT)
Dept: PHYSICAL THERAPY | Facility: HOSPITAL | Age: 39
End: 2024-08-28
Attending: INTERNAL MEDICINE
Payer: COMMERCIAL

## 2024-08-28 PROCEDURE — 97530 THERAPEUTIC ACTIVITIES: CPT

## 2024-08-28 PROCEDURE — 97112 NEUROMUSCULAR REEDUCATION: CPT

## 2024-08-29 ENCOUNTER — OFFICE VISIT (OUTPATIENT)
Dept: FAMILY MEDICINE CLINIC | Facility: CLINIC | Age: 39
End: 2024-08-29
Payer: COMMERCIAL

## 2024-08-29 ENCOUNTER — HOSPITAL ENCOUNTER (OUTPATIENT)
Dept: GENERAL RADIOLOGY | Age: 39
Discharge: HOME | End: 2024-08-29
Attending: PHYSICIAN ASSISTANT
Payer: COMMERCIAL

## 2024-08-29 ENCOUNTER — APPOINTMENT (OUTPATIENT)
Dept: PHYSICAL THERAPY | Facility: HOSPITAL | Age: 39
End: 2024-08-29
Attending: INTERNAL MEDICINE
Payer: COMMERCIAL

## 2024-08-29 ENCOUNTER — LAB ENCOUNTER (OUTPATIENT)
Dept: LAB | Age: 39
End: 2024-08-29
Attending: PHYSICIAN ASSISTANT
Payer: COMMERCIAL

## 2024-08-29 VITALS
DIASTOLIC BLOOD PRESSURE: 84 MMHG | HEART RATE: 77 BPM | WEIGHT: 216 LBS | SYSTOLIC BLOOD PRESSURE: 121 MMHG | HEIGHT: 67.5 IN | BODY MASS INDEX: 33.51 KG/M2

## 2024-08-29 DIAGNOSIS — R53.83 FATIGUE, UNSPECIFIED TYPE: ICD-10-CM

## 2024-08-29 DIAGNOSIS — R05.1 ACUTE COUGH: ICD-10-CM

## 2024-08-29 DIAGNOSIS — E66.09 CLASS 1 OBESITY DUE TO EXCESS CALORIES WITHOUT SERIOUS COMORBIDITY WITH BODY MASS INDEX (BMI) OF 33.0 TO 33.9 IN ADULT: ICD-10-CM

## 2024-08-29 DIAGNOSIS — R05.1 ACUTE COUGH: Primary | ICD-10-CM

## 2024-08-29 DIAGNOSIS — S39.012A STRAIN OF LUMBAR REGION, INITIAL ENCOUNTER: ICD-10-CM

## 2024-08-29 LAB
ANION GAP SERPL CALC-SCNC: 5 MMOL/L (ref 0–18)
BASOPHILS # BLD AUTO: 0.04 X10(3) UL (ref 0–0.2)
BASOPHILS NFR BLD AUTO: 0.7 %
BUN BLD-MCNC: 13 MG/DL (ref 9–23)
BUN/CREAT SERPL: 16.9 (ref 10–20)
CALCIUM BLD-MCNC: 9.6 MG/DL (ref 8.7–10.4)
CHLORIDE SERPL-SCNC: 108 MMOL/L (ref 98–112)
CO2 SERPL-SCNC: 28 MMOL/L (ref 21–32)
CREAT BLD-MCNC: 0.77 MG/DL
DEPRECATED RDW RBC AUTO: 39.8 FL (ref 35.1–46.3)
EGFRCR SERPLBLD CKD-EPI 2021: 101 ML/MIN/1.73M2 (ref 60–?)
EOSINOPHIL # BLD AUTO: 0.05 X10(3) UL (ref 0–0.7)
EOSINOPHIL NFR BLD AUTO: 0.8 %
ERYTHROCYTE [DISTWIDTH] IN BLOOD BY AUTOMATED COUNT: 12.1 % (ref 11–15)
EST. AVERAGE GLUCOSE BLD GHB EST-MCNC: 100 MG/DL (ref 68–126)
FASTING STATUS PATIENT QL REPORTED: NO
GLUCOSE BLD-MCNC: 97 MG/DL (ref 70–99)
HBA1C MFR BLD: 5.1 % (ref ?–5.7)
HCT VFR BLD AUTO: 43.6 %
HGB BLD-MCNC: 14.6 G/DL
IMM GRANULOCYTES # BLD AUTO: 0.01 X10(3) UL (ref 0–1)
IMM GRANULOCYTES NFR BLD: 0.2 %
LYMPHOCYTES # BLD AUTO: 1.92 X10(3) UL (ref 1–4)
LYMPHOCYTES NFR BLD AUTO: 31.5 %
MCH RBC QN AUTO: 30.2 PG (ref 26–34)
MCHC RBC AUTO-ENTMCNC: 33.5 G/DL (ref 31–37)
MCV RBC AUTO: 90.3 FL
MONOCYTES # BLD AUTO: 0.43 X10(3) UL (ref 0.1–1)
MONOCYTES NFR BLD AUTO: 7 %
NEUTROPHILS # BLD AUTO: 3.65 X10 (3) UL (ref 1.5–7.7)
NEUTROPHILS # BLD AUTO: 3.65 X10(3) UL (ref 1.5–7.7)
NEUTROPHILS NFR BLD AUTO: 59.8 %
OSMOLALITY SERPL CALC.SUM OF ELEC: 292 MOSM/KG (ref 275–295)
PLATELET # BLD AUTO: 288 10(3)UL (ref 150–450)
POTASSIUM SERPL-SCNC: 4.7 MMOL/L (ref 3.5–5.1)
RBC # BLD AUTO: 4.83 X10(6)UL
SODIUM SERPL-SCNC: 141 MMOL/L (ref 136–145)
TSI SER-ACNC: 0.92 MIU/ML (ref 0.55–4.78)
VIT B12 SERPL-MCNC: 1102 PG/ML (ref 211–911)
WBC # BLD AUTO: 6.1 X10(3) UL (ref 4–11)

## 2024-08-29 PROCEDURE — 83036 HEMOGLOBIN GLYCOSYLATED A1C: CPT

## 2024-08-29 PROCEDURE — 71046 X-RAY EXAM CHEST 2 VIEWS: CPT | Performed by: PHYSICIAN ASSISTANT

## 2024-08-29 PROCEDURE — 3074F SYST BP LT 130 MM HG: CPT | Performed by: PHYSICIAN ASSISTANT

## 2024-08-29 PROCEDURE — 85025 COMPLETE CBC W/AUTO DIFF WBC: CPT

## 2024-08-29 PROCEDURE — 3008F BODY MASS INDEX DOCD: CPT | Performed by: PHYSICIAN ASSISTANT

## 2024-08-29 PROCEDURE — 3079F DIAST BP 80-89 MM HG: CPT | Performed by: PHYSICIAN ASSISTANT

## 2024-08-29 PROCEDURE — 99213 OFFICE O/P EST LOW 20 MIN: CPT | Performed by: PHYSICIAN ASSISTANT

## 2024-08-29 PROCEDURE — 80048 BASIC METABOLIC PNL TOTAL CA: CPT

## 2024-08-29 PROCEDURE — 82607 VITAMIN B-12: CPT

## 2024-08-29 PROCEDURE — 84443 ASSAY THYROID STIM HORMONE: CPT

## 2024-08-29 PROCEDURE — 36415 COLL VENOUS BLD VENIPUNCTURE: CPT

## 2024-08-29 RX ORDER — CHLORHEXIDINE GLUCONATE 4 %
LIQUID (ML) TOPICAL
COMMUNITY
Start: 2024-04-01

## 2024-08-29 NOTE — PROGRESS NOTES
HPI:     HPI  A 38-year-old female is in the office complaining of lower back pain for couple of weeks. The pain does not radiate.  The patient also has had an intermittent productive cough for a couple of months.  The patient denies chest pain, SOB, N/V/C/D, fever, dizziness, syncope, and abdominal pain. There are no other concerns today.    Medications:     Current Outpatient Medications   Medication Sig Dispense Refill    Multiple Vitamins-Minerals (WOMENS MULTIVITAMIN) Oral Tab       Cholecalciferol (VITAMIN D) 1000 units Oral Tab Take 1,000 Units by mouth daily.           Allergies:   No Known Allergies    History:     Health Maintenance   Topic Date Due    COVID-19 Vaccine (4 - 2023-24 season) 09/01/2023    Influenza Vaccine (1) 10/01/2024    Annual Physical  07/08/2025    Pap Smear  11/21/2026    DTaP,Tdap,and Td Vaccines (4 - Td or Tdap) 04/14/2033    Annual Depression Screening  Completed    Pneumococcal Vaccine: Birth to 64yrs  Aged Out       Patient's last menstrual period was 08/18/2024 (approximate).   Past Medical History:     Past Medical History:    Decorative tattoo    HPV in female    Thyroid cyst    goiter    Varicella    Had chicken pox during childhood       Past Surgical History:     Past Surgical History:   Procedure Laterality Date    Colposcopy, cervix w/upper adjacent vagina; w/biopsy(s), cervix      Leep  2015       Family History:     Family History   Problem Relation Age of Onset    Breast Cancer Paternal Aunt 60        unsure of age    Prostate Cancer Father        Social History:     Social History     Socioeconomic History    Marital status:      Spouse name: Not on file    Number of children: Not on file    Years of education: Not on file    Highest education level: Not on file   Occupational History    Not on file   Tobacco Use    Smoking status: Former     Current packs/day: 0.00     Average packs/day: 0.5 packs/day for 15.0 years (7.5 ttl pk-yrs)     Types: Cigarettes      Start date: 3/17/2002     Quit date: 3/17/2017     Years since quittin.4     Passive exposure: Past    Smokeless tobacco: Never   Vaping Use    Vaping status: Never Used   Substance and Sexual Activity    Alcohol use: Not Currently     Comment: SOCIALLY    Drug use: No    Sexual activity: Yes     Partners: Male   Other Topics Concern     Service Not Asked    Blood Transfusions Not Asked    Caffeine Concern Yes     Comment: Coffee 1 cup daily    Occupational Exposure Not Asked    Hobby Hazards Not Asked    Sleep Concern Not Asked    Stress Concern Not Asked    Weight Concern Not Asked    Special Diet Not Asked    Back Care Not Asked    Exercise Not Asked    Bike Helmet Not Asked    Seat Belt Not Asked    Self-Exams Not Asked    Grew up on a farm Not Asked    History of tanning Not Asked    Outdoor occupation Not Asked    Pt has a pacemaker No    Pt has a defibrillator No    Breast feeding Not Asked    Reaction to local anesthetic No   Social History Narrative    Not on file     Social Determinants of Health     Financial Resource Strain: Low Risk  (2023)    Financial Resource Strain     Difficulty of Paying Living Expenses: Not hard at all     Med Affordability: No   Food Insecurity: No Food Insecurity (2023)    Food Insecurity     Food Insecurity: Never true   Transportation Needs: No Transportation Needs (2023)    Transportation Needs     Lack of Transportation: No   Physical Activity: Not on file   Stress: No Stress Concern Present (2023)    Stress     Feeling of Stress : No   Social Connections: Not on file   Housing Stability: Low Risk  (2023)    Housing Stability     Housing Instability: No     Housing Instability Emergency: Not on file     Crib or Bassinette: Not on file       Review of Systems:   Review of Systems   Respiratory:  Positive for cough.    Musculoskeletal:  Positive for back pain.        Vitals:    24 0911   BP: 121/84   Pulse: 77   Weight: 216 lb (98  kg)   Height: 5' 7.5\" (1.715 m)     Body mass index is 33.33 kg/m².    Physical Exam:   Physical Exam  Vitals reviewed.   Constitutional:       General: She is not in acute distress.     Appearance: She is well-developed.   HENT:      Head: Normocephalic and atraumatic.      Right Ear: External ear normal.      Left Ear: External ear normal.      Nose: Nose normal.   Eyes:      General:         Right eye: No discharge.         Left eye: No discharge.      Conjunctiva/sclera: Conjunctivae normal.   Cardiovascular:      Rate and Rhythm: Normal rate and regular rhythm.      Heart sounds: Normal heart sounds, S1 normal and S2 normal. No murmur heard.  Pulmonary:      Effort: Pulmonary effort is normal.      Breath sounds: Normal breath sounds. No wheezing or rales.   Chest:      Chest wall: No tenderness.   Lymphadenopathy:      Cervical: No cervical adenopathy.   Skin:     Findings: No rash.   Neurological:      Mental Status: She is alert and oriented to person, place, and time.   Psychiatric:         Behavior: Behavior is cooperative.      Inspection: Neck and spine have no noted deformities or signs of inflammation. Curvature of cervical, thoracic, and lumbar spine are within normal limits. Posture is upright, and gait is smooth and normal.  Palpation: Spinous processed of C7-L5 palpable, midline, and non- tender.   Flexion, extension, and side to side rotation of cervical spine causes no discomfort.        Assessment and Plan::     Problem List Items Addressed This Visit          Endocrine and Metabolic    Class 1 obesity due to excess calories without serious comorbidity with body mass index (BMI) of 33.0 to 33.9 in adult    Relevant Orders    CBC With Differential With Platelet (Completed)    Basic Metabolic Panel (8) (Completed)    Hemoglobin A1C (Completed)    TSH W Reflex To Free T4 (Completed)    Vitamin B12 (Completed)     Other Visit Diagnoses       Acute cough    -  Primary    Relevant Orders    XR CHEST  PA + LAT CHEST (CPT=71046) (Completed)    Fatigue, unspecified type        Relevant Orders    CBC With Differential With Platelet (Completed)    Basic Metabolic Panel (8) (Completed)    Hemoglobin A1C (Completed)    TSH W Reflex To Free T4 (Completed)    Vitamin B12 (Completed)    Strain of lumbar region, initial encounter            Advise patient to take Tylenol or Ibuprofen as needed for pain.    Discussed plan of care with pt and pt is in agreement.All questions answered. Pt to call with questions or concerns.

## 2024-08-30 PROBLEM — E66.811 CLASS 1 OBESITY DUE TO EXCESS CALORIES WITHOUT SERIOUS COMORBIDITY WITH BODY MASS INDEX (BMI) OF 33.0 TO 33.9 IN ADULT: Status: ACTIVE | Noted: 2024-08-30

## 2024-08-30 PROBLEM — E66.09 CLASS 1 OBESITY DUE TO EXCESS CALORIES WITHOUT SERIOUS COMORBIDITY WITH BODY MASS INDEX (BMI) OF 33.0 TO 33.9 IN ADULT: Status: ACTIVE | Noted: 2024-08-30

## 2024-09-03 NOTE — PROGRESS NOTES
Diagnosis: Vertigo (R42)   Dizziness, BPPV      Insurance: [facility] PPO  Visit (# authorized):  no auth, 8 per POC                         Referring Provider: Heber    Precautions: None    Pain: Pt not being treated for pain    Subjective: Pt said she's feeling pretty good overall. No headaches and still feels off when first waking up and during her workouts today (squat and rotations);  board with smiley face was more aggravating.  Dizziness : 0/10 upon arrival   Head pain: 0/10   Dizziness Handicap Inventory (DHI): 18/100     Objective:    Date  7/30/24 8/8/24 8/19/24 8/28/24 9/4/24      Visit Number  2/8 3/8 4/8 5/8 6/8      NMR x x x x X      Ther EX           Ther Act   x x       Gait Training           CRM            Manual             Additional Treatment Information:     NMR: 42'  Chop/left 4# mb eyes focused on target x10 bilat; eyes follow ball x10 bilat; EC x10 bilat  VOR cancellation while walking neer target (card); vertical x2 laps; horizontal x5 laps  VOR x2 standing neer target 3x30\" horizontal  Walking in hallway with diagonal head turns x2 laps  VOR x1, distant target walking fwd and bwd x3 laps  VOR x1 horizontal and vertical, sticky note on busy visual painting in hallway 3x30\" horizontal, 1x30\" vertical   Rebounder throws 4#mb x10 throws normal; rotational throws x10 bilat  Colorful ball rainbow throws 5x10 (busy gym in background); walking through clinic x2 laps   Gaze stabilization at smiley face on  board; wide JASON EO to EC x3 rounds of 20\" each     7/30/24- Dynamic Visual Acuity: 1 line degradation, no symptoms     Patient Education:  Education provided on test findings, HEP reviewed and educated on grounding techniques.   Migraine Education:  Symptoms: headache, head pressure; nausea/vomiting, intermittent episodes of dizziness, persistent lightheadedness; pain at back of neck, base of skull, and/or radiating into neck; changes in vision (difficulty focusing, blurred vision);  ocular migraines (sparkles, dots, lightning bolts); sound, light, and/or motion sensitivity; sinus or facial pressure; ear pain, fullness, pressure, ringing.     Benefits of rescue med    Persistent Postural Perceptual Dizziness (PPPD) Criteria according to ICVD:  A. One of more symptoms of dizziness, unsteadiness, or nonspinning vertigo are present on most days for 3mo or more  Symptoms last for prolonged periods of time but may wax and wane in severity  Symptoms need not be present continuously throughout the entire day  B. Persistent symptoms occur without specific provocation, but are exacerbated by three factors:  Upright posture  Active or passive motion without regard to direction or position  Exposure to moving visual stimuli or complex visual patterns  C. The disorder is precipitated by conditions that cause vertigo, unsteadiness, dizziness, or problems with balance including acute, episodic, or chronic vestibular syndromes; other neurologic or medical illnesses; or psychological distress  D. Symptoms cause significant distress or functional impairment  E. Symptoms are not better accounted for by another disease or disorder     Date: 07/30/2024  - Forward Walking Horizontal/vertical VOR Cancellation  - 1 x daily - 7 x weekly - 10 reps  - Standing Balance with Eyes Closed  - 1 x daily - 7 x weekly - 3 sets - 30 second hold  - Wall Squat  - 1 x daily - 7 x weekly - 2 sets - 10 reps    Date: 8/19/24  - Walking Gaze Stabilization Head Rotation  - 1 x daily - 7 x weekly - 4-5 laps   - Gaze Stability (VOR) x1 Forward Ambulation With Distant Target With Horizontal Head Turns  - 1 x daily - 7 x weekly - 4-5 laps    Date: 8/28/24  - Walking with tennis ball throw  - VOR x1 with  board     Assessment:  Continued focusing on gaze stabilization training in static stance and with dynamic activities. The checkerboard remains the most aggravating- therefore advised her to discontinue use at home to avoid aggressive  flare ups in symptoms.     Goals: (to be met in 8 visits)  1. Negative Irish-Hallpike and roll tests.- met 8/19/24  2. Able to perform position changes such as supine to/from sit and bending over to the floor without dizziness to improve safety in functional tasks.  3. Pt will be able to ambulate with horizontal head turns for visual scanning without path deviation or dizziness to improve safety during gait.  4. Pt to have good understanding of difference between spinning vertigo vs dysequilibrium to differentiate BPPV vs persistent positional vertigo.  5. Pt will be independent with her HEP to promote compliance and self management of symptoms.     Plan: Modified motion sensitivity, Grounding, habituation    Charges: 3 NMR      Total Timed Treatment: 42 min  Total Treatment Time: 42  min    21st Century Cures Act Notice to Patient: Medical documents like this are made available to patients in the interest of transparency. However, be advised this is a medical document and it is intended as [id number]-bj-mcuk communication between your medical providers. This medical document may contain abbreviations, assessments, medical data, and results or other terms that are unfamiliar. Medical documents are intended to carry relevant information, facts as evident, and the clinical opinion of the practitioner. As such, this medical document may be written in language that appears blunt or direct. You are encouraged to contact your medical provider and/or [facility] Patient Experience if you have any questions about this medical document.

## 2024-09-04 ENCOUNTER — OFFICE VISIT (OUTPATIENT)
Dept: PHYSICAL THERAPY | Facility: HOSPITAL | Age: 39
End: 2024-09-04
Attending: INTERNAL MEDICINE
Payer: COMMERCIAL

## 2024-09-04 PROCEDURE — 97112 NEUROMUSCULAR REEDUCATION: CPT

## 2024-09-06 ENCOUNTER — APPOINTMENT (OUTPATIENT)
Dept: PHYSICAL THERAPY | Facility: HOSPITAL | Age: 39
End: 2024-09-06
Attending: INTERNAL MEDICINE
Payer: COMMERCIAL

## 2024-09-10 NOTE — PROGRESS NOTES
Diagnosis: Vertigo (R42)   Dizziness, BPPV      Insurance: [facility] PPO  Visit (# authorized):  no auth, 8 per POC                         Referring Provider: Heber    Precautions: None    Pain: Pt not being treated for pain    Subjective: Pt said she's feeling pretty good overall. Had a good week. Pt says exercises feel good, still staying away from the checkerboard.  Dizziness : 0/10 upon arrival   Head pain: 0/10   Dizziness Handicap Inventory (DHI): 18/100     Objective:    Date  7/30/24 8/8/24 8/19/24 8/28/24 9/4/24 9/11/24     Visit Number  2/8 3/8 4/8 5/8 6/8 7/8     NMR x x x x X X     Ther EX           Ther Act   x x       Gait Training           CRM            Manual             Additional Treatment Information:     NMR: 42'  Core stabilization:  Mod deadbug (legs only in hooklying position) x10 bilat  Mod deadbug (contralat leg in hook lying) x10 bilat  Deadbug x10 legs ext; x10 legs bent x10  TA iso press with yellow SB with diaphragm breating x20   Quadruped rocking x15 EO; x15 EC  Bird dog review   TRX RDL (2 targets for gaze stabilization) x10   TRX SL RDL (2 targets for gaze stabilization) x10 bilat    Habituation/Grounding  Supine <> sit up (feels \"boaty and floaty\" worse when sitting up)- cued to find focus point at each position; repeated with EC x1 (held)  Romberg EC x30\"  Romberg EC head head turns x1'  Romberg EC head nods x1'   Romberg EC on blue airex 2x30\"  Romberg on blue airex, EO, head turns x30\"  Romberg on blue airex, EO, head nods x30\"    Patient Education:  Education provided on test findings, HEP reviewed and educated on grounding techniques.   Migraine Education:  Symptoms: headache, head pressure; nausea/vomiting, intermittent episodes of dizziness, persistent lightheadedness; pain at back of neck, base of skull, and/or radiating into neck; changes in vision (difficulty focusing, blurred vision); ocular migraines (sparkles, dots, lightning bolts); sound, light, and/or motion  sensitivity; sinus or facial pressure; ear pain, fullness, pressure, ringing.     Benefits of rescue med    Date: 07/30/2024  - Forward Walking Horizontal/vertical VOR Cancellation  - 1 x daily - 7 x weekly - 10 reps  - Standing Balance with Eyes Closed  - 1 x daily - 7 x weekly - 3 sets - 30 second hold  - Wall Squat  - 1 x daily - 7 x weekly - 2 sets - 10 reps    Date: 8/19/24  - Walking Gaze Stabilization Head Rotation  - 1 x daily - 7 x weekly - 4-5 laps   - Gaze Stability (VOR) x1 Forward Ambulation With Distant Target With Horizontal Head Turns  - 1 x daily - 7 x weekly - 4-5 laps    Date: 8/28/24  - Walking with tennis ball throw     Assessment:  Focused on core activation today to promote bodily awareness and stabilization. Pt continues to deny room spinning vertigo, but primarily reports feeling like she is \"on a boat\" and \"floaty\" primarily when getting up out of the bed first thing in the morning. This improved with have 2 focal points for gaze stabilization; she remains reliant on visual cues.     Goals: (to be met in 8 visits)  1. Negative Irish-Hallpike and roll tests.- met 8/19/24  2. Able to perform position changes such as supine to/from sit and bending over to the floor without dizziness to improve safety in functional tasks.  3. Pt will be able to ambulate with horizontal head turns for visual scanning without path deviation or dizziness to improve safety during gait.  4. Pt to have good understanding of difference between spinning vertigo vs dysequilibrium to differentiate BPPV vs persistent positional vertigo.  5. Pt will be independent with her HEP to promote compliance and self management of symptoms.     Plan: Modified motion sensitivity, Grounding, habituation    Charges: 3 NMR      Total Timed Treatment: 42 min  Total Treatment Time: 42  min    21st Century Cures Act Notice to Patient: Medical documents like this are made available to patients in the interest of transparency. However, be  advised this is a medical document and it is intended as pldy-vw-uftq communication between your medical providers. This medical document may contain abbreviations, assessments, medical data, and results or other terms that are unfamiliar. Medical documents are intended to carry relevant information, facts as evident, and the clinical opinion of the practitioner. As such, this medical document may be written in language that appears blunt or direct. You are encouraged to contact your medical provider and/or [facility] Patient Experience if you have any questions about this medical document.

## 2024-09-11 ENCOUNTER — OFFICE VISIT (OUTPATIENT)
Dept: PHYSICAL THERAPY | Facility: HOSPITAL | Age: 39
End: 2024-09-11
Attending: INTERNAL MEDICINE
Payer: COMMERCIAL

## 2024-09-11 PROCEDURE — 97112 NEUROMUSCULAR REEDUCATION: CPT

## 2024-09-13 ENCOUNTER — APPOINTMENT (OUTPATIENT)
Dept: PHYSICAL THERAPY | Facility: HOSPITAL | Age: 39
End: 2024-09-13
Attending: INTERNAL MEDICINE
Payer: COMMERCIAL

## 2024-09-17 NOTE — PROGRESS NOTES
Progress Summary  Pt has attended 8 visits in Physical Therapy.      Plan: Continue skilled Physical Therapy 1 x/week or a total of 8 additional visits over a 90 day period.     Patient/Family/Caregiver was advised of these findings, precautions, and treatment options and has agreed to actively participate in planning and for this course of care.    Thank you for your referral. If you have any questions, please contact me at Dept: [phone].    Sincerely,  Electronically signed by therapist: Henry Cuadra PT     Physician's certification required:  Yes  Please co-sign or sign and return this letter via fax as soon as possible to [phone].   I certify the need for these services furnished under this plan of treatment and while under my care.    X___________________________________________________ Date____________________    Certification From: 9/17/2024  To:12/16/2024      Diagnosis: Vertigo (R42)   Dizziness, BPPV      Insurance: [facility] PPO  Visit (# authorized):  no auth, 8 per POC                         Referring Provider: Heber    Precautions: None    Pain: Pt not being treated for pain    Subjective: Pt said things are pretty good and she's had good weeks. She still wakes up with fogginess but says it seems to be related to allergies. Focused on core stabilization when dizziness got worse which helped.   Dizziness : 0/10 upon arrival   Head pain: 0/10   Initial Dizziness Handicap Inventory (DHI): 18/100 9/18/24: DHI: 14/100    Objective:    Date  7/30/24 8/8/24 8/19/24 8/28/24 9/4/24 9/11/24 9/18/24    Visit Number  2/8 3/8 4/8 5/8 6/8 7/8 8/8    NMR x x x x X X x    Ther EX           Ther Act   x x       Gait Training           CRM            Manual             Additional Treatment Information:     NMR: 42'    Postural Control:   Romberg: EO >30 sec, EC >30 sec   Romberg on Foam: EO >30 sec, EC >30 sec   Tandem Stance: R back EO >30 sec, EC 12 sec mild LOB to left; L back EO >30 sec, EC >30 sec    SLS: R EO >30 sec, EC 10 sec; L EO >30 sec, EC 24 sec     Functional Gait Assessment   Item Description Score (0 worst, 3 best)    ____3___1. Gait Level Surface-  Time: _____4.21_____seconds  ____3___2. Change in gait speed  ____3___3. Gait with horizontal head turns   ____3___4. Gait with vertical head turns  ____3___5. Gait and pivot turn  ____3___6. Step over obstacle  ____3___7. Gait with narrow base of support-  # of steps____>10____  ____2___8. Gait with eyes closed- veered left  ____3___9. Ambulating backward  ____3___10. Steps    TOTAL SCORE: ____29___/30    (less than 22/30 = fall risk)     Modified Motion Sensitivity Test (mMST)  5x Horizontal Head turns: no change (0)  5x Vertical head turns: no change (0)  5x Right diagonal head turns (uper left quadrant down to right): no change (0)  5x Left diagonal head turns (left upper quadrant down to left): no change (0)  5x Trunk bends (bending knees reaching to floor): no change (0)  5x right quarter body turns (look over right shoulder with trunk rotation, feet planted): no change (0)  5x left quarter body turns (look over left shoulder with trunk rotation, feet planted): no change (0)  1x 360 turn to right: 1/10 intensity; <5 sec duration (0)  1x 360 turn to the left: 1/10 intensity; <5 sec duration (0)  5x VOR cancellation (follow thumbs horizontally with head/trunk rotation x45 degrees each way): 1/10 intensity; <5 sec duration (0)    MSQ= Total Score x (# of positions)/14: 2.14 (MILD)    Habituation/Grounding   360 spins with gaze stabilization x5 to the R and L (slowed down)- started to feel symptoms after 3rd rep   Wide Jorje, EC, mod perturbations    360 spins with near target gaze stabilization x5 to R and L (slower)- improved symptoms compared to before   Kalin GOMEZ, grounding gaze stabilization on checkerboard with smiley face x30\" (grounding helps but still feels wobbly)     Put Phuc card over smiley face: when looking at the Phuc feels movement, when  looking at K in upper right corner, feels better    Put sticky note over smiley face: better improved symptoms   VOR x1 horizontal 3x30\"  board with sticky note over smiley face      Patient Education:  Education provided on test findings, HEP reviewed and educated on grounding techniques.   Migraine Education:  Symptoms: headache, head pressure; nausea/vomiting, intermittent episodes of dizziness, persistent lightheadedness; pain at back of neck, base of skull, and/or radiating into neck; changes in vision (difficulty focusing, blurred vision); ocular migraines (sparkles, dots, lightning bolts); sound, light, and/or motion sensitivity; sinus or facial pressure; ear pain, fullness, pressure, ringing.       Assessment: Pt continues to report vague, mild dizziness when getting up in the morning that is worse with allergy symptoms. Believe the remaining symptoms are related to vestibular migraine or sinus pressure, as she has negative oculomotor exam, positional testing, and did very well on the motion sensitivity test. She reported 1/10 dizziness when 360 turns and VOR cancellation but symptoms improve with gaze stabilization. VORx1 with  board remains difficult but improves with bigger target for VOR. Pt will benefit form continued therapy to address ongoing dysequilibrium.    Goals: (to be met in 8 visits)  1. Negative Irish-Hallpike and roll tests.- met 8/19/24  2. Able to perform position changes such as supine to/from sit and bending over to the floor without dizziness to improve safety in functional tasks. Almost most  3. Pt will be able to ambulate with horizontal head turns for visual scanning without path deviation or dizziness to improve safety during gait. met  4. Pt to have good understanding of difference between spinning vertigo vs dysequilibrium to differentiate BPPV vs persistent positional vertigo. ongoing  5. Pt will be independent with her HEP to promote compliance and self management of  symptoms. ongoing    Plan: Modified motion sensitivity, Grounding, habituation    Charges: 3 NMR      Total Timed Treatment: 42 min  Total Treatment Time: 42  min    21st Century Cures Act Notice to Patient: Medical documents like this are made available to patients in the interest of transparency. However, be advised this is a medical document and it is intended as [id number] communication between your medical providers. This medical document may contain abbreviations, assessments, medical data, and results or other terms that are unfamiliar. Medical documents are intended to carry relevant information, facts as evident, and the clinical opinion of the practitioner. As such, this medical document may be written in language that appears blunt or direct. You are encouraged to contact your medical provider and/or [facility] Patient Experience if you have any questions about this medical document.

## 2024-09-18 ENCOUNTER — OFFICE VISIT (OUTPATIENT)
Dept: PHYSICAL THERAPY | Facility: HOSPITAL | Age: 39
End: 2024-09-18
Attending: INTERNAL MEDICINE
Payer: COMMERCIAL

## 2024-09-18 PROCEDURE — 97112 NEUROMUSCULAR REEDUCATION: CPT

## 2024-09-20 ENCOUNTER — APPOINTMENT (OUTPATIENT)
Dept: PHYSICAL THERAPY | Facility: HOSPITAL | Age: 39
End: 2024-09-20
Attending: INTERNAL MEDICINE
Payer: COMMERCIAL

## 2024-09-24 NOTE — PROGRESS NOTES
Discharge Summary  Pt has attended 9 visits in Physical Therapy.    Diagnosis: Vertigo (R42)   Dizziness, BPPV      Insurance: [facility] PPO  Visit (# authorized):  no auth, 8 per POC                         Referring Provider: Heber    Precautions: None    Pain: Pt not being treated for pain    Subjective: Pt said things are good. Had a weird week. A lot of sinus pressure and headache. Stopped breath feeding so believes it is related to hormonal changes. Exercises were fine, VOR cancellation caused some dizziness but believes it is related to head pressure.   Dizziness : 0/10 upon arrival   Head pain/headache: 2/10 (pressure and tingling)  Initial Dizziness Handicap Inventory (DHI): 18/100 9/18/24: DHI: 14/100    Objective:    Date  7/30/24 8/8/24 8/19/24 8/28/24 9/4/24 9/11/24 9/18/24 9/25/24   Visit Number  2/8 3/8 4/8 5/8 6/8 7/8 8/8 9/16   NMR x x x x X X x x   Ther EX           Ther Act   x x       Gait Training           CRM            Manual             Additional Treatment Information:     Patient Education:  Reviewed migraine symptoms and management and vestibular migraine symptoms and management.   Migraine Education:  Symptoms: headache, head pressure; nausea/vomiting, intermittent episodes of dizziness, persistent lightheadedness; pain at back of neck, base of skull, and/or radiating into neck; changes in vision (difficulty focusing, blurred vision); ocular migraines (sparkles, dots, lightning bolts); sound, light, and/or motion sensitivity; sinus or facial pressure; ear pain, fullness, pressure, ringing.       Assessment: Change in POC- pt only has symptoms with increased head and sinus pressure/fullness which comes and goes depending on the day. She is able to identify triggers for her migraine including hormonal and weather changes. Vestibular migraine symptoms are worse with onset of her typical migraine. Grounding, habituation, core stabilization, and gaze stabilization exercises have improved her  symptoms. Believe the missing piece of management is use of a migraine medication. Encouraged pt to discuss this with her neurologist. If symptoms return or worsen, advised her to call or [facility] [employer] message for any questions.     Goals: (to be met in 8 visits)  1. Negative Logan-Hallpike and roll tests.- met 8/19/24  2. Able to perform position changes such as supine to/from sit and bending over to the floor without dizziness to improve safety in functional tasks. Mostly met-   3. Pt will be able to ambulate with horizontal head turns for visual scanning without path deviation or dizziness to improve safety during gait. met  4. Pt to have good understanding of difference between spinning vertigo vs dysequilibrium to differentiate BPPV vs persistent positional vertigo. met  5. Pt will be independent with her HEP to promote compliance and self management of symptoms. met    Plan: DC from PT. Reach out to neurology and discuss rescue medications or migraine management.    Charges: 1 NMR      Total Timed Treatment: 15 min  Total Treatment Time: 15  min    21st Century Cures Act Notice to Patient: Medical documents like this are made available to patients in the interest of transparency. However, be advised this is a medical document and it is intended as [id number] communication between your medical providers. This medical document may contain abbreviations, assessments, medical data, and results or other terms that are unfamiliar. Medical documents are intended to carry relevant information, facts as evident, and the clinical opinion of the practitioner. As such, this medical document may be written in language that appears blunt or direct. You are encouraged to contact your medical provider and/or [facility] Patient Experience if you have any questions about this medical document.

## 2024-09-25 ENCOUNTER — OFFICE VISIT (OUTPATIENT)
Dept: PHYSICAL THERAPY | Facility: HOSPITAL | Age: 39
End: 2024-09-25
Attending: INTERNAL MEDICINE
Payer: COMMERCIAL

## 2024-09-25 PROCEDURE — 97112 NEUROMUSCULAR REEDUCATION: CPT

## 2024-09-27 ENCOUNTER — APPOINTMENT (OUTPATIENT)
Dept: PHYSICAL THERAPY | Facility: HOSPITAL | Age: 39
End: 2024-09-27
Attending: INTERNAL MEDICINE
Payer: COMMERCIAL

## 2024-10-04 ENCOUNTER — APPOINTMENT (OUTPATIENT)
Dept: PHYSICAL THERAPY | Facility: HOSPITAL | Age: 39
End: 2024-10-04
Attending: INTERNAL MEDICINE
Payer: COMMERCIAL

## 2024-10-07 ENCOUNTER — OFFICE VISIT (OUTPATIENT)
Dept: FAMILY MEDICINE CLINIC | Facility: CLINIC | Age: 39
End: 2024-10-07
Payer: COMMERCIAL

## 2024-10-07 VITALS
SYSTOLIC BLOOD PRESSURE: 132 MMHG | DIASTOLIC BLOOD PRESSURE: 83 MMHG | HEART RATE: 84 BPM | BODY MASS INDEX: 34.13 KG/M2 | HEIGHT: 67.5 IN | WEIGHT: 220 LBS

## 2024-10-07 DIAGNOSIS — R06.83 SNORES: Primary | ICD-10-CM

## 2024-10-07 DIAGNOSIS — R09.81 CHRONIC NASAL CONGESTION: ICD-10-CM

## 2024-10-07 DIAGNOSIS — K21.9 GASTROESOPHAGEAL REFLUX DISEASE WITHOUT ESOPHAGITIS: ICD-10-CM

## 2024-10-07 PROCEDURE — 90686 IIV4 VACC NO PRSV 0.5 ML IM: CPT | Performed by: PHYSICIAN ASSISTANT

## 2024-10-07 PROCEDURE — 99213 OFFICE O/P EST LOW 20 MIN: CPT | Performed by: PHYSICIAN ASSISTANT

## 2024-10-07 PROCEDURE — 3008F BODY MASS INDEX DOCD: CPT | Performed by: PHYSICIAN ASSISTANT

## 2024-10-07 PROCEDURE — 3075F SYST BP GE 130 - 139MM HG: CPT | Performed by: PHYSICIAN ASSISTANT

## 2024-10-07 PROCEDURE — 3079F DIAST BP 80-89 MM HG: CPT | Performed by: PHYSICIAN ASSISTANT

## 2024-10-07 PROCEDURE — 90471 IMMUNIZATION ADMIN: CPT | Performed by: PHYSICIAN ASSISTANT

## 2024-10-07 RX ORDER — OMEPRAZOLE 40 MG/1
40 CAPSULE, DELAYED RELEASE ORAL
Qty: 90 CAPSULE | Refills: 0 | Status: SHIPPED | OUTPATIENT
Start: 2024-10-07 | End: 2025-01-05

## 2024-10-07 RX ORDER — PREDNISONE 20 MG/1
20 TABLET ORAL DAILY
Qty: 5 TABLET | Refills: 0 | Status: SHIPPED | OUTPATIENT
Start: 2024-10-07 | End: 2024-10-12

## 2024-10-07 NOTE — PROGRESS NOTES
HPI:     HPI  A 38-year-old female is in the office complaining of headache, snoring, and chest tightness for the past week.  She sometimes wakes up during sleep to gasp for air. She sleeps 6-8 hours per night.   The patient denies chest pain, SOB, N/V/C/D, fever, dizziness, syncope, and abdominal pain. There are no other concerns today.      Medications:     Current Outpatient Medications   Medication Sig Dispense Refill    predniSONE 20 MG Oral Tab Take 1 tablet (20 mg total) by mouth daily for 5 days. 5 tablet 0    Omeprazole 40 MG Oral Capsule Delayed Release Take 1 capsule (40 mg total) by mouth daily as needed. 90 capsule 0    Multiple Vitamins-Minerals (WOMENS MULTIVITAMIN) Oral Tab       Cholecalciferol (VITAMIN D) 1000 units Oral Tab Take 1,000 Units by mouth daily.           Allergies:   No Known Allergies    History:     Health Maintenance   Topic Date Due    COVID-19 Vaccine (4 - 2023-24 season) 09/01/2024    Influenza Vaccine (1) 10/01/2024    Annual Physical  07/08/2025    Pap Smear  11/21/2026    DTaP,Tdap,and Td Vaccines (4 - Td or Tdap) 04/14/2033    Annual Depression Screening  Completed    Pneumococcal Vaccine: Birth to 64yrs  Aged Out       Patient's last menstrual period was 09/14/2024 (approximate).   Past Medical History:     Past Medical History:    Decorative tattoo    HPV in female    Thyroid cyst    goiter    Varicella    Had chicken pox during childhood       Past Surgical History:     Past Surgical History:   Procedure Laterality Date    Colposcopy, cervix w/upper adjacent vagina; w/biopsy(s), cervix      Leep  2015       Family History:     Family History   Problem Relation Age of Onset    Breast Cancer Paternal Aunt 60        unsure of age    Prostate Cancer Father        Social History:     Social History     Socioeconomic History    Marital status:      Spouse name: Not on file    Number of children: Not on file    Years of education: Not on file    Highest education level:  Not on file   Occupational History    Not on file   Tobacco Use    Smoking status: Former     Current packs/day: 0.00     Average packs/day: 0.5 packs/day for 15.0 years (7.5 ttl pk-yrs)     Types: Cigarettes     Start date: 3/17/2002     Quit date: 3/17/2017     Years since quittin.5     Passive exposure: Past    Smokeless tobacco: Never   Vaping Use    Vaping status: Never Used   Substance and Sexual Activity    Alcohol use: Not Currently     Comment: SOCIALLY    Drug use: No    Sexual activity: Yes     Partners: Male   Other Topics Concern     Service Not Asked    Blood Transfusions Not Asked    Caffeine Concern Yes     Comment: Coffee 1 cup daily    Occupational Exposure Not Asked    Hobby Hazards Not Asked    Sleep Concern Not Asked    Stress Concern Not Asked    Weight Concern Not Asked    Special Diet Not Asked    Back Care Not Asked    Exercise Not Asked    Bike Helmet Not Asked    Seat Belt Not Asked    Self-Exams Not Asked    Grew up on a farm Not Asked    History of tanning Not Asked    Outdoor occupation Not Asked    Pt has a pacemaker No    Pt has a defibrillator No    Breast feeding Not Asked    Reaction to local anesthetic No   Social History Narrative    Not on file     Social Determinants of Health     Financial Resource Strain: Low Risk  (2023)    Financial Resource Strain     Difficulty of Paying Living Expenses: Not hard at all     Med Affordability: No   Food Insecurity: No Food Insecurity (2023)    Food Insecurity     Food Insecurity: Never true   Transportation Needs: No Transportation Needs (2023)    Transportation Needs     Lack of Transportation: No   Physical Activity: Not on file   Stress: No Stress Concern Present (2023)    Stress     Feeling of Stress : No   Social Connections: Not on file   Housing Stability: Low Risk  (2023)    Housing Stability     Housing Instability: No     Housing Instability Emergency: Not on file     Crib or Tamiko: Not  on file       Review of Systems:   Review of Systems   Respiratory:  Positive for chest tightness.    Neurological:  Positive for headaches.        Vitals:    10/07/24 1544   BP: 132/83   Pulse: 84   Weight: 220 lb (99.8 kg)   Height: 5' 7.5\" (1.715 m)     Body mass index is 33.95 kg/m².    Physical Exam:   Physical Exam  Vitals reviewed.   Constitutional:       General: She is not in acute distress.     Appearance: She is well-developed.   HENT:      Head: Normocephalic and atraumatic.      Right Ear: External ear normal.      Left Ear: External ear normal.      Nose: Nose normal.   Eyes:      General:         Right eye: No discharge.         Left eye: No discharge.      Conjunctiva/sclera: Conjunctivae normal.   Cardiovascular:      Rate and Rhythm: Normal rate and regular rhythm.      Heart sounds: Normal heart sounds, S1 normal and S2 normal. No murmur heard.  Pulmonary:      Effort: Pulmonary effort is normal.      Breath sounds: Normal breath sounds. No wheezing or rales.   Chest:      Chest wall: No tenderness.   Lymphadenopathy:      Cervical: No cervical adenopathy.   Skin:     Findings: No rash.   Neurological:      Mental Status: She is alert and oriented to person, place, and time.   Psychiatric:         Behavior: Behavior is cooperative.          Assessment and Plan::     Problem List Items Addressed This Visit          Gastrointestinal and Abdominal    Gastroesophageal reflux disease without esophagitis    Relevant Medications    Omeprazole 40 MG Oral Capsule Delayed Release     Other Visit Diagnoses       Snores    -  Primary    Relevant Orders    Home Sleep Apnea Test (Adult pt only) - Sleep consult required for Medicare pts    General sleep study    Chronic nasal congestion        Relevant Medications    predniSONE 20 MG Oral Tab    Other Relevant Orders    ENT Referral - [place] ([facility])          Discussed plan of care with pt and pt is in agreement.All questions answered. Pt to call  with questions or concerns.

## 2024-10-09 ENCOUNTER — APPOINTMENT (OUTPATIENT)
Dept: PHYSICAL THERAPY | Facility: HOSPITAL | Age: 39
End: 2024-10-09
Attending: INTERNAL MEDICINE
Payer: COMMERCIAL

## 2024-10-10 ENCOUNTER — APPOINTMENT (OUTPATIENT)
Dept: GENERAL RADIOLOGY | Facility: HOSPITAL | Age: 39
End: 2024-10-10
Payer: COMMERCIAL

## 2024-10-10 ENCOUNTER — HOSPITAL ENCOUNTER (EMERGENCY)
Facility: HOSPITAL | Age: 39
Discharge: HOME | End: 2024-10-10
Attending: EMERGENCY MEDICINE
Payer: COMMERCIAL

## 2024-10-10 ENCOUNTER — TELEPHONE (OUTPATIENT)
Dept: PULMONOLOGY | Facility: CLINIC | Age: 39
End: 2024-10-10

## 2024-10-10 VITALS
OXYGEN SATURATION: 95 % | HEART RATE: 83 BPM | BODY MASS INDEX: 34.53 KG/M2 | SYSTOLIC BLOOD PRESSURE: 123 MMHG | HEIGHT: 67 IN | TEMPERATURE: 98 F | WEIGHT: 220 LBS | DIASTOLIC BLOOD PRESSURE: 83 MMHG | RESPIRATION RATE: 18 BRPM

## 2024-10-10 DIAGNOSIS — R14.2 BELCHING: ICD-10-CM

## 2024-10-10 DIAGNOSIS — R06.00 DYSPNEA, UNSPECIFIED TYPE: Primary | ICD-10-CM

## 2024-10-10 DIAGNOSIS — R10.9 ABDOMINAL PRESSURE: ICD-10-CM

## 2024-10-10 LAB
ALBUMIN SERPL-MCNC: 4.6 G/DL (ref 3.2–4.8)
ALBUMIN/GLOB SERPL: 1.8 {RATIO} (ref 1–2)
ALP LIVER SERPL-CCNC: 52 U/L
ALT SERPL-CCNC: 15 U/L
ANION GAP SERPL CALC-SCNC: 8 MMOL/L (ref 0–18)
AST SERPL-CCNC: 15 U/L (ref ?–34)
ATRIAL RATE: 71 BPM
B-HCG UR QL: NEGATIVE
BASOPHILS # BLD AUTO: 0.02 X10(3) UL (ref 0–0.2)
BASOPHILS NFR BLD AUTO: 0.3 %
BILIRUB SERPL-MCNC: 0.8 MG/DL (ref 0.3–1.2)
BILIRUB UR QL: NEGATIVE
BUN BLD-MCNC: 16 MG/DL (ref 9–23)
BUN/CREAT SERPL: 21.1 (ref 10–20)
CALCIUM BLD-MCNC: 9.6 MG/DL (ref 8.7–10.4)
CHLORIDE SERPL-SCNC: 110 MMOL/L (ref 98–112)
CLARITY UR: CLEAR
CO2 SERPL-SCNC: 23 MMOL/L (ref 21–32)
COLOR UR: COLORLESS
CREAT BLD-MCNC: 0.76 MG/DL
D DIMER PPP FEU-MCNC: 0.35 UG/ML FEU (ref ?–0.5)
DEPRECATED RDW RBC AUTO: 39.8 FL (ref 35.1–46.3)
EGFRCR SERPLBLD CKD-EPI 2021: 103 ML/MIN/1.73M2 (ref 60–?)
EOSINOPHIL # BLD AUTO: 0.02 X10(3) UL (ref 0–0.7)
EOSINOPHIL NFR BLD AUTO: 0.3 %
ERYTHROCYTE [DISTWIDTH] IN BLOOD BY AUTOMATED COUNT: 12.5 % (ref 11–15)
GLOBULIN PLAS-MCNC: 2.6 G/DL (ref 2–3.5)
GLUCOSE BLD-MCNC: 113 MG/DL (ref 70–99)
GLUCOSE UR-MCNC: NORMAL MG/DL
HCT VFR BLD AUTO: 39.4 %
HGB BLD-MCNC: 13.5 G/DL
HGB UR QL STRIP.AUTO: NEGATIVE
IMM GRANULOCYTES # BLD AUTO: 0.02 X10(3) UL (ref 0–1)
IMM GRANULOCYTES NFR BLD: 0.3 %
KETONES UR-MCNC: NEGATIVE MG/DL
LEUKOCYTE ESTERASE UR QL STRIP.AUTO: NEGATIVE
LYMPHOCYTES # BLD AUTO: 1.22 X10(3) UL (ref 1–4)
LYMPHOCYTES NFR BLD AUTO: 18.7 %
MCH RBC QN AUTO: 29.9 PG (ref 26–34)
MCHC RBC AUTO-ENTMCNC: 34.3 G/DL (ref 31–37)
MCV RBC AUTO: 87.4 FL
MONOCYTES # BLD AUTO: 0.32 X10(3) UL (ref 0.1–1)
MONOCYTES NFR BLD AUTO: 4.9 %
NEUTROPHILS # BLD AUTO: 4.94 X10 (3) UL (ref 1.5–7.7)
NEUTROPHILS # BLD AUTO: 4.94 X10(3) UL (ref 1.5–7.7)
NEUTROPHILS NFR BLD AUTO: 75.5 %
NITRITE UR QL STRIP.AUTO: NEGATIVE
NT-PROBNP SERPL-MCNC: <35 PG/ML (ref ?–125)
OSMOLALITY SERPL CALC.SUM OF ELEC: 294 MOSM/KG (ref 275–295)
P AXIS: 26 DEGREES
P-R INTERVAL: 140 MS
PH UR: 5.5 [PH] (ref 5–8)
PLATELET # BLD AUTO: 259 10(3)UL (ref 150–450)
POTASSIUM SERPL-SCNC: 4.5 MMOL/L (ref 3.5–5.1)
PROT SERPL-MCNC: 7.2 G/DL (ref 5.7–8.2)
PROT UR-MCNC: NEGATIVE MG/DL
Q-T INTERVAL: 364 MS
QRS DURATION: 88 MS
QTC CALCULATION (BEZET): 395 MS
R AXIS: 68 DEGREES
RBC # BLD AUTO: 4.51 X10(6)UL
SODIUM SERPL-SCNC: 141 MMOL/L (ref 136–145)
SP GR UR STRIP: <1.005 (ref 1–1.03)
T AXIS: 26 DEGREES
TROPONIN I SERPL HS-MCNC: <3 NG/L
UROBILINOGEN UR STRIP-ACNC: NORMAL
VENTRICULAR RATE: 71 BPM
WBC # BLD AUTO: 6.5 X10(3) UL (ref 4–11)

## 2024-10-10 PROCEDURE — 93005 ELECTROCARDIOGRAM TRACING: CPT

## 2024-10-10 PROCEDURE — 94799 UNLISTED PULMONARY SVC/PX: CPT

## 2024-10-10 PROCEDURE — 80053 COMPREHEN METABOLIC PANEL: CPT | Performed by: EMERGENCY MEDICINE

## 2024-10-10 PROCEDURE — 85379 FIBRIN DEGRADATION QUANT: CPT | Performed by: EMERGENCY MEDICINE

## 2024-10-10 PROCEDURE — 94640 AIRWAY INHALATION TREATMENT: CPT

## 2024-10-10 PROCEDURE — 99285 EMERGENCY DEPT VISIT HI MDM: CPT

## 2024-10-10 PROCEDURE — 71045 X-RAY EXAM CHEST 1 VIEW: CPT | Performed by: EMERGENCY MEDICINE

## 2024-10-10 PROCEDURE — 81025 URINE PREGNANCY TEST: CPT

## 2024-10-10 PROCEDURE — 85025 COMPLETE CBC W/AUTO DIFF WBC: CPT | Performed by: EMERGENCY MEDICINE

## 2024-10-10 PROCEDURE — 93010 ELECTROCARDIOGRAM REPORT: CPT

## 2024-10-10 PROCEDURE — 36415 COLL VENOUS BLD VENIPUNCTURE: CPT

## 2024-10-10 PROCEDURE — 84484 ASSAY OF TROPONIN QUANT: CPT | Performed by: EMERGENCY MEDICINE

## 2024-10-10 PROCEDURE — 81003 URINALYSIS AUTO W/O SCOPE: CPT | Performed by: EMERGENCY MEDICINE

## 2024-10-10 PROCEDURE — 83880 ASSAY OF NATRIURETIC PEPTIDE: CPT | Performed by: EMERGENCY MEDICINE

## 2024-10-10 RX ORDER — IPRATROPIUM BROMIDE AND ALBUTEROL SULFATE 2.5; .5 MG/3ML; MG/3ML
3 SOLUTION RESPIRATORY (INHALATION) ONCE
Status: COMPLETED | OUTPATIENT
Start: 2024-10-10 | End: 2024-10-10

## 2024-10-10 NOTE — DISCHARGE INSTRUCTIONS
Return for changes or worsening.  Please follow-up with gastroenterologist for further evaluation of your abdominal symptoms.  Return to the ER for changes or worsening.  Follow-up with the pulmonology group for evaluation of the shortness of breath.  Follow-up with your primary.  Make sure you take the antiacid including other medications.  Read all instructions.

## 2024-10-10 NOTE — ED PROVIDER NOTES
Patient Seen in: [facility] Emergency Department      History     Chief Complaint   Patient presents with    Shortness Of Breath     Stated Complaint: SHAWANDA, faint, feeling \"overall unwell\"    Subjective:   38-year-old female here with complaints of dyspnea.  She says really all summer she has had a little short of breath and nasal congestion however she feels like it is worse in the last week.  She still is able to do her HIT workouts but she seems to wake up short of breath.  She feels it is worse in bed than it is on exertion.  No chest pain but she does have a lot of belching.  No trauma or travel or PE risks.  No leg swelling.  She has a slight cough.  No fevers.  She saw her doctor in 2 days ago started on prednisone.  She did one of her workouts this morning which was ok.  No focal weakness.  No numbness.  No vomiting or diarrhea.  No alcohol or tobacco.  Her doctor also gave her some antiacid to use as needed for the belching.  She said a couple years ago some like this happened and she had PFTs done.              Objective:     Past Medical History:    Decorative tattoo    HPV in female    Thyroid cyst    goiter    Varicella    Had chicken pox during childhood              Past Surgical History:   Procedure Laterality Date    Colposcopy, cervix w/upper adjacent vagina; w/biopsy(s), cervix      Leep                  Social History     Socioeconomic History    Marital status:    Tobacco Use    Smoking status: Former     Current packs/day: 0.00     Average packs/day: 0.5 packs/day for 15.0 years (7.5 ttl pk-yrs)     Types: Cigarettes     Start date: 3/17/2002     Quit date: 3/17/2017     Years since quittin.5     Passive exposure: Past    Smokeless tobacco: Never   Vaping Use    Vaping status: Never Used   Substance and Sexual Activity    Alcohol use: Not Currently     Comment: SOCIALLY    Drug use: No    Sexual activity: Yes     Partners: Male   Other Topics Concern    Caffeine Concern  Yes     Comment: Coffee 1 cup daily    Pt has a pacemaker No    Pt has a defibrillator No    Reaction to local anesthetic No     Social Drivers of Health     Financial Resource Strain: Low Risk  (5/28/2023)    Financial Resource Strain     Difficulty of Paying Living Expenses: Not hard at all     Med Affordability: No   Food Insecurity: No Food Insecurity (5/28/2023)    Food Insecurity     Food Insecurity: Never true   Transportation Needs: No Transportation Needs (5/28/2023)    Transportation Needs     Lack of Transportation: No   Stress: No Stress Concern Present (5/28/2023)    Stress     Feeling of Stress : No   Housing Stability: Low Risk  (5/28/2023)    Housing Stability     Housing Instability: No                  Physical Exam     ED Triage Vitals [10/10/24 0921]   /67   Pulse 108   Resp 25   Temp 98.2 °F (36.8 °C)   Temp src Oral   SpO2 99 %   O2 Device None (Room air)       Current Vitals:   Vital Signs  BP: 128/73  Pulse: 70  Resp: 14  Temp: 98.2 °F (36.8 °C)  Temp src: Oral  MAP (mmHg): 89    Oxygen Therapy  SpO2: 99 %  O2 Device: None (Room air)        Physical Exam  HENT:      Head: Normocephalic.      Mouth/Throat:      Mouth: Mucous membranes are moist.      Pharynx: Oropharynx is clear.   Eyes:      Extraocular Movements: Extraocular movements intact.      Pupils: Pupils are equal, round, and reactive to light.   Cardiovascular:      Rate and Rhythm: Normal rate and regular rhythm.      Heart sounds: Normal heart sounds.   Pulmonary:      Effort: Pulmonary effort is normal.      Breath sounds: Normal breath sounds.   Abdominal:      Palpations: Abdomen is soft.      Tenderness: There is no abdominal tenderness.   Musculoskeletal:         General: No swelling or tenderness. Normal range of motion.      Cervical back: Normal range of motion.   Lymphadenopathy:      Cervical: No cervical adenopathy.   Skin:     General: Skin is warm.      Capillary Refill: Capillary refill takes less than 2  seconds.   Neurological:      General: No focal deficit present.      Mental Status: She is alert.             ED Course     Labs Reviewed   COMP METABOLIC PANEL (14) - Abnormal; Notable for the following components:       Result Value    Glucose 113 (*)     BUN/CREA Ratio 21.1 (*)     All other components within normal limits   URINALYSIS, ROUTINE - Abnormal; Notable for the following components:    Urine Color Colorless (*)     Spec Gravity <1.005 (*)     Squamous Epi. Cells Few (*)     All other components within normal limits   D-DIMER - Normal   TROPONIN I HIGH SENSITIVITY - Normal   PRO BETA NATRIURETIC PEPTIDE - Normal   POCT PREGNANCY URINE - Normal   CBC WITH DIFFERENTIAL WITH PLATELET     EKG    Rate, intervals and axes as noted on EKG Report.  Rate: 71  Rhythm: Sinus Rhythm  Reading: Normal sinus rhythm and rate.  Normal axis and intervals.  Normal ST segments.  This EKG was interpreted by me. normal           ED Course as of 10/10/24 1133  ------------------------------------------------------------  Time: 10/10 1052  Comment: Labs independently interpreted by me.  CBC normal, CMP normal, pregnancy negative, troponin less than 3 proBNP less than 35.  UA fairly unremarkable.  D-dimer 0.35 which is in the normal range.  Chest x-ray completely clear.  Will discuss with patient.  Will reexamine now that she has received a DuoNeb  ------------------------------------------------------------  Time: 10/10 1125  Comment: Patient doing well.  She had an opportunity to ask questions.  She has been having this upper abdominal pressure for couple weeks and belching and asked if something could be pushing up on her lungs making it hard to breathe.  I explained I did not know at this time.  We talked about the pros and cons of getting a CAT scan and did not feel that was indicated at this time.  She had no tenderness and not really in significant pain.  She was comfortable with this.  That being said I said she should  follow-up with GI and they may need to further evaluate her either with endoscopy or advanced imaging.  In the meantime she will take antiacid daily.  Close suspicion for PE.  She also said that a nebulizer did not really do anything.  I reexamined her and her lungs are clear.  I will give her GI and pulmonary follow-up and she understands to come back for changes or worsening.  She voiced understanding of the instructions.  Good anticipatory guidance was given.  ------------------------------------------------------------  Time: 10/10 1127  Comment: Low suspicion with ACS as she has been doing her HIIT workouts.              MDM      XR CHEST AP PORTABLE  (CPT=71045)    Result Date: 10/10/2024  CONCLUSION: No acute cardiopulmonary disease.    Dictated by (CST): Morris King MD on 10/10/2024 at 10:25 AM     Finalized by (CST): Morris King MD on 10/10/2024 at 10:31 AM                 Medical Decision Making  38-year-old with no significant history here with months of shortness of breath which is now getting worse.  There are some orthopnea as well.  She is able to exert.  No chest pain.  Differential is vast but could include ACS, PE, pneumonia, viral infection, pleural effusion, pericardial effusion, CHF and many other possibilities.  Normal sinus rhythm and rate on the monitor.  Her EKG showed a rate of 71 and was normal.  Pulse ox normal 99% on room air while I was in the room.  Labs will be ordered    Amount and/or Complexity of Data Reviewed  External Data Reviewed: notes.     Details: Office visit from yesterday reviewed.  Patient does have a prescription for PPI  Labs: ordered. Decision-making details documented in ED Course.  Radiology: ordered and independent interpretation performed. Decision-making details documented in ED Course.  ECG/medicine tests: ordered and independent interpretation performed. Decision-making details documented in ED Course.  Discussion of management or test  interpretation with external provider(s): Please see ED course, I did communicate with her advanced practitioner who will get her back in for follow-up.  We discussed this through [employer] bubble chat    Risk  Prescription drug management.  Risk Details: Goiter        Disposition and Plan     Clinical Impression:  1. Dyspnea, unspecified type    2. Abdominal pressure    3. Belching         Disposition:  Discharge  10/10/2024 11:32 am    Follow-up:  Mayuri Buck MD  [address]  [place] [id number] [phone]    Follow up      Se Bullock MD  [address]  [place] [address]  [phone]    Follow up      Hayden Mayo MD  [address]  [place] [id number] [phone]    Follow up            Medications Prescribed:  Current Discharge Medication List              Supplementary Documentation:

## 2024-10-10 NOTE — TELEPHONE ENCOUNTER
Patient calling to schedule hospital follow up.  Patient was told to be seen ASAP.  Patient complains of shortness of breath and feels as if lungs do not completely fill with air.  Patient states that she was told to schedule with Dr Mayo but states that she would be ok with seeing any provider.  Please call

## 2024-10-10 NOTE — TELEPHONE ENCOUNTER
Patient seen in [facility] Emergency Department today for dyspnea. Renetta accepted appointment with Dr. Vitale on 10/11/24 10 am ([facility]). Appointment information given. She voiced understanding.

## 2024-10-11 ENCOUNTER — OFFICE VISIT (OUTPATIENT)
Dept: PULMONOLOGY | Facility: CLINIC | Age: 39
End: 2024-10-11
Payer: COMMERCIAL

## 2024-10-11 ENCOUNTER — APPOINTMENT (OUTPATIENT)
Dept: PHYSICAL THERAPY | Facility: HOSPITAL | Age: 39
End: 2024-10-11
Attending: INTERNAL MEDICINE
Payer: COMMERCIAL

## 2024-10-11 VITALS
OXYGEN SATURATION: 100 % | HEIGHT: 67 IN | BODY MASS INDEX: 34.53 KG/M2 | SYSTOLIC BLOOD PRESSURE: 113 MMHG | WEIGHT: 220 LBS | DIASTOLIC BLOOD PRESSURE: 60 MMHG | HEART RATE: 67 BPM

## 2024-10-11 DIAGNOSIS — R06.02 SOB (SHORTNESS OF BREATH): Primary | ICD-10-CM

## 2024-10-11 DIAGNOSIS — K21.9 GASTROESOPHAGEAL REFLUX DISEASE, UNSPECIFIED WHETHER ESOPHAGITIS PRESENT: ICD-10-CM

## 2024-10-11 PROCEDURE — 3078F DIAST BP <80 MM HG: CPT | Performed by: INTERNAL MEDICINE

## 2024-10-11 PROCEDURE — 3074F SYST BP LT 130 MM HG: CPT | Performed by: INTERNAL MEDICINE

## 2024-10-11 PROCEDURE — 99204 OFFICE O/P NEW MOD 45 MIN: CPT | Performed by: INTERNAL MEDICINE

## 2024-10-11 PROCEDURE — 3008F BODY MASS INDEX DOCD: CPT | Performed by: INTERNAL MEDICINE

## 2024-10-11 RX ORDER — FLUTICASONE FUROATE AND VILANTEROL 100; 25 UG/1; UG/1
1 POWDER RESPIRATORY (INHALATION) DAILY
Qty: 60 EACH | Refills: 5 | Status: SHIPPED | OUTPATIENT
Start: 2024-10-11

## 2024-10-11 NOTE — PATIENT INSTRUCTIONS
Fill the prescription for an inhaler called Breo (100). Take 1 puff once a day. Make sure to rinse mouth and gargle with water and spit after each use. Contact us if there are any issues with insurance or pharmacy coverage.    Schedule Pulmonary Function Testing    Continue the acid reflux medication and the allergy medicines.    Agree with scheduling the sleep study.  - Strongly advise absolutely NO driving when sleepy  - Recommend good sleep hygiene as discussed   - Avoid alcohol and caffeine before going to bed    Agree with seeing a gastroenterologist.       Come back in 6 weeks.

## 2024-10-11 NOTE — PROGRESS NOTES
Initial Pulmonary Medicine Outpatient Consultation           Reason for Consultation and CC: breathing issues     Referring Physician: Dr. Buck       HPI: I had the pleasure of seeing Renetta Milian for a Pulmonary Medicine consultation on 10/11/24.  39 yo woman with hx of no previous pulmonary issues being seen for respiratory issues.   Suffers from seasonal allergies. Allergies are usually more significant during the summer and feels nose has been more congested this past few months.  Reports she's been short of breath all summer. More so over the past week. Unable to get a deep breath in.   Wakes up gasping for air, twitching at night. Is belching and feels her GERD is uncontrolled.   Pain under ribs and epigastric area-changes with eating. Also describes trouble swallowing  Denies snoring.   Saw her PCP for these symptoms.   Prescribed antacids and Prednisone 20 mg by her PCP. Has taken 2/5 days thus far w/o any improvement. Also referred for sleep study and GI.  Also coughs.some times brings up phlegm.   Went to the ED yesterday. Was given a neb treatment which didn't help. CXR was clear.        REVIEW OF SYSTEMS:  Positives and negatives as stated in HPI. Remainder of 12 pt review of systems otherwise are negative.       PAST MEDICAL HISTORY:  Past Medical History:    Decorative tattoo    HPV in female    Thyroid cyst    goiter    Varicella    Had chicken pox during childhood   Seasonal/environmental allergies  Anxiety  GERD       PAST SURGICAL HISTORY:  Past Surgical History:   Procedure Laterality Date    Colposcopy, cervix w/upper adjacent vagina; w/biopsy(s), cervix      Leep  2015        PAST FAMILY HISTORY:  Family History   Problem Relation Age of Onset    Breast Cancer Paternal Aunt 60        unsure of age    Prostate Cancer Father    No pulmonary problems.       PAST SOCIAL HISTORY:  Social History     Socioeconomic History    Marital status:    Tobacco Use    Smoking status: Former      Current packs/day: 0.00     Average packs/day: 0.5 packs/day for 15.0 years (7.5 ttl pk-yrs)     Types: Cigarettes     Start date: 3/17/2002     Quit date: 3/17/2017     Years since quittin.5     Passive exposure: Past    Smokeless tobacco: Never   Vaping Use    Vaping status: Never Used   Substance and Sexual Activity    Alcohol use: Not Currently     Comment: SOCIALLY    Drug use: No    Sexual activity: Yes     Partners: Male   Other Topics Concern    Caffeine Concern Yes     Comment: Coffee 1 cup daily    Pt has a pacemaker No    Pt has a defibrillator No    Reaction to local anesthetic No     Social Drivers of Health     Financial Resource Strain: Low Risk  (2023)    Financial Resource Strain     Difficulty of Paying Living Expenses: Not hard at all     Med Affordability: No   Food Insecurity: No Food Insecurity (2023)    Food Insecurity     Food Insecurity: Never true   Transportation Needs: No Transportation Needs (2023)    Transportation Needs     Lack of Transportation: No   Stress: No Stress Concern Present (2023)    Stress     Feeling of Stress : No   Housing Stability: Low Risk  (2023)    Housing Stability     Housing Instability: No   Quit smoking in 2017. 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 No OP exudates. MMP 3  NECK/THROAT: no JVD. Trachea midline. No obvious thyromegaly  LUNG: clear b/l no wheezing, crackles. Chest symmetric with respiratory motion  HEART: regular rate and rhythm, no obvious murmers or gallops  note  ABD: soft non tender. + bowel sounds. No organomegaly noted  EXT: no clubbing, cyanosis, or edema noted. Pulses intact grossly  NEURO/MUSCULOSKELETAL: no gross deficits  SKIN: warm, dry. No obvious lesions noted  LYMPH: no obvious LAD       IMAGES:  CXR 10/10/24  Clear  Official read:  FINDINGS:   CARDIAC/VASC: Normal.  No cardiac silhouette abnormality or cardiomegaly.  Unremarkable pulmonary vasculature.   MEDIAST/ADELINA:  No visible mass or adenopathy.   LUNGS/PLEURA: Normal.  No significant pulmonary parenchymal abnormalities.  No effusion or pleural thickening.   BONES: No fracture or visible bony lesion.   OTHER: Negative.           LABS:  Lab Results   Component Value Date    WBC 6.5 10/10/2024    RBC 4.51 10/10/2024    HGB 13.5 10/10/2024    HCT 39.4 10/10/2024    MCV 87.4 10/10/2024    MCH 29.9 10/10/2024    MCHC 34.3 10/10/2024    MPV 9.2 04/02/2018     Recent Labs   Lab 10/10/24  1011   *   BUN 16   CREATSERUM 0.76   EGFRCR 103   CA 9.6   ALB 4.6      K 4.5      CO2 23.0   ALKPHO 52   AST 15   ALT 15   BILT 0.8   TP 7.2        PFTS none        ASSESSMENT/PLAN:  1.Dyspnea   -Concern for possible asthma (judi given hx of seasonal allergies) but atypical symptoms and no improvement after neb tx in the ED and being on Prednisone.  -Schedule PFTs  -Trial Breo (100) inhaler. Pt shown proper inhaler technique during clinic visit. Advised to let us know if the inhaler is not covered by insurance.    2.GERD  -agree with PPI and referral to GI as likely contributing to her symptoms    3.Possible JULIANNE  -Sleep study ordered by PCP  - Strongly advise absolutely NO driving when sleepy  - Recommend good sleep hygiene as discussed   - Avoid alcohol and caffeine before going to bed    4.Seasonal allergies  -on OTC antihistamines and Flonase per pt    5.Previous smoking hx  -20 pack yrs. Doesn't qualify for LDCT at this time  -CXR clear      RTC in 6 weeks.        Thank you for the opportunity to care  for Renetta Milian.    I spent a total of 47 minutes in direct contact with the patient and reviewing pertinent outside records on the day of the encounter.     YAZMIN Vitale DO, MPH  Pulmonary and Critical Care Medicine  [facility] Pulmonary and Critical Care Medicine          [1] No Known Allergies  [2]   Current Outpatient Medications on File Prior to Visit   Medication Sig Dispense Refill    predniSONE 20 MG Oral Tab Take 1 tablet (20 mg total) by mouth daily for 5 days. 5 tablet 0    Omeprazole 40 MG Oral Capsule Delayed Release Take 1 capsule (40 mg total) by mouth daily as needed. 90 capsule 0    Multiple Vitamins-Minerals (WOMENS MULTIVITAMIN) Oral Tab       Cholecalciferol (VITAMIN D) 1000 units Oral Tab Take 1,000 Units by mouth daily.         No current facility-administered medications on file prior to visit.

## 2024-10-17 ENCOUNTER — HOSPITAL ENCOUNTER (OUTPATIENT)
Dept: RESPIRATORY THERAPY | Facility: HOSPITAL | Age: 39
Discharge: HOME | End: 2024-10-17
Attending: INTERNAL MEDICINE
Payer: COMMERCIAL

## 2024-10-17 DIAGNOSIS — R06.02 SOB (SHORTNESS OF BREATH): ICD-10-CM

## 2024-10-17 PROCEDURE — 94726 PLETHYSMOGRAPHY LUNG VOLUMES: CPT | Performed by: INTERNAL MEDICINE

## 2024-10-17 PROCEDURE — 94729 DIFFUSING CAPACITY: CPT | Performed by: INTERNAL MEDICINE

## 2024-10-17 PROCEDURE — 94060 EVALUATION OF WHEEZING: CPT | Performed by: INTERNAL MEDICINE

## 2024-10-17 NOTE — PROCEDURES
PULMONARY FUNCTION TESTING INTERPRETATION        Spirometry:  Forced vital capacity (FVC) is: normal   Forced expiratory volume in 1 second (FEV1) is: normal   FEV1/FVC ratio: normal   Significant bronchodilator response? No         Flow Volume Loop:  Normal        Lung Volume:  Total lung capacity (TLC) is: normal   Residual volume (RV) is: normal         Diffusing Capacity:  Normal         Interpretation:  Normal spirometry values.  There is no significant bronchodilator response.  This does not preclude the use of bronchodilator therapy if clinically indicated.  Normal lung volumes and diffusing capacity.   Correlate clinically.    The PFT raw data is available in EPIC EMR under CHART REVIEW under the PROCEDURES tab. Please click on the the PFT and there should be an attached hyperlink which shows the attached test when clicked on.        Electronically signed by    YAZMIN Vitale DO, MPH  Pulmonary Critical Care Medicine  [facility] Pulmonary [facility] Medicine

## 2024-10-18 ENCOUNTER — APPOINTMENT (OUTPATIENT)
Dept: PHYSICAL THERAPY | Facility: HOSPITAL | Age: 39
End: 2024-10-18
Attending: INTERNAL MEDICINE
Payer: COMMERCIAL

## 2024-10-25 ENCOUNTER — APPOINTMENT (OUTPATIENT)
Dept: PHYSICAL THERAPY | Facility: HOSPITAL | Age: 39
End: 2024-10-25
Attending: INTERNAL MEDICINE
Payer: COMMERCIAL

## 2024-11-01 ENCOUNTER — APPOINTMENT (OUTPATIENT)
Dept: PHYSICAL THERAPY | Facility: HOSPITAL | Age: 39
End: 2024-11-01
Attending: INTERNAL MEDICINE
Payer: COMMERCIAL

## 2024-11-08 ENCOUNTER — OFFICE VISIT (OUTPATIENT)
Dept: DERMATOLOGY CLINIC | Facility: CLINIC | Age: 39
End: 2024-11-08
Payer: COMMERCIAL

## 2024-11-08 DIAGNOSIS — L70.0 ACNE VULGARIS: ICD-10-CM

## 2024-11-08 DIAGNOSIS — L82.1 SK (SEBORRHEIC KERATOSIS): Primary | ICD-10-CM

## 2024-11-08 DIAGNOSIS — D22.9 MULTIPLE BENIGN NEVI: ICD-10-CM

## 2024-11-08 DIAGNOSIS — L81.4 LENTIGO: ICD-10-CM

## 2024-11-08 DIAGNOSIS — D18.01 HEMANGIOMA OF SKIN AND SUBCUTANEOUS TISSUE: ICD-10-CM

## 2024-11-08 DIAGNOSIS — D23.9 BENIGN NEOPLASM OF SKIN, UNSPECIFIED LOCATION: ICD-10-CM

## 2024-11-08 DIAGNOSIS — Z87.2 HISTORY OF SUN-DAMAGED SKIN: ICD-10-CM

## 2024-11-08 PROCEDURE — 99213 OFFICE O/P EST LOW 20 MIN: CPT | Performed by: DERMATOLOGY

## 2024-11-08 RX ORDER — TRETINOIN 0.5 MG/G
CREAM TOPICAL
Qty: 20 G | Refills: 1 | Status: SHIPPED | OUTPATIENT
Start: 2024-11-08

## 2024-11-11 ENCOUNTER — TELEPHONE (OUTPATIENT)
Dept: OBGYN CLINIC | Facility: CLINIC | Age: 39
End: 2024-11-11

## 2024-11-11 NOTE — TELEPHONE ENCOUNTER
Last annual 11/21/23 with Dr. Dangelo   Next annual scheduled on 11/26/24 with LICO  Last mammogram - 10/18/21 (normal result)    Patient calling to report she started noticing a pink blotch discoloration on her left breast 1 week ago that is about 1 inch x 1 inch. She denies having an itchiness, rash, pain, tenderness, or bump in that area. She denies any changes in her soaps, lotions, or deodorant. Patient does admit she works out a lot and sometimes will leave her sports bra on for too long, and notices she'll get a rash under her breast (she states she's getting over that).    Patient stopped breastfeeding since 9/3/24. She reports feeling lumps and bumps, stating \"it feels lumpy all over always\", and thinks she can feel it more after breast feeding. Patient has annual scheduled on 11/26, asking if she needs to be seen sooner.     Patient advised to continue to monitor and report any new changes or worsening symptoms, but a message will be routed to Dr. Dangelo for review.     To Dr. Dangelo to please review and advise - if patient needs to be seen sooner or if ok to wait until annual on 11/26? Thank you.

## 2024-11-11 NOTE — TELEPHONE ENCOUNTER
Patient states she noticed some discoloration on her left breast for the past week. Please advise looking for guidance

## 2024-11-15 ENCOUNTER — OFFICE VISIT (OUTPATIENT)
Facility: CLINIC | Age: 39
End: 2024-11-15
Payer: COMMERCIAL

## 2024-11-15 ENCOUNTER — TELEPHONE (OUTPATIENT)
Facility: CLINIC | Age: 39
End: 2024-11-15

## 2024-11-15 VITALS
DIASTOLIC BLOOD PRESSURE: 73 MMHG | BODY MASS INDEX: 34.53 KG/M2 | HEART RATE: 83 BPM | WEIGHT: 220 LBS | SYSTOLIC BLOOD PRESSURE: 110 MMHG | HEIGHT: 67 IN

## 2024-11-15 DIAGNOSIS — R19.4 CHANGE IN BOWEL HABITS: Primary | ICD-10-CM

## 2024-11-15 DIAGNOSIS — K21.9 GASTROESOPHAGEAL REFLUX DISEASE, UNSPECIFIED WHETHER ESOPHAGITIS PRESENT: ICD-10-CM

## 2024-11-15 DIAGNOSIS — K59.00 CONSTIPATION, UNSPECIFIED CONSTIPATION TYPE: ICD-10-CM

## 2024-11-15 DIAGNOSIS — R13.19 ESOPHAGEAL DYSPHAGIA: ICD-10-CM

## 2024-11-15 PROCEDURE — 3078F DIAST BP <80 MM HG: CPT | Performed by: NURSE PRACTITIONER

## 2024-11-15 PROCEDURE — 99244 OFF/OP CNSLTJ NEW/EST MOD 40: CPT | Performed by: NURSE PRACTITIONER

## 2024-11-15 PROCEDURE — 3074F SYST BP LT 130 MM HG: CPT | Performed by: NURSE PRACTITIONER

## 2024-11-15 PROCEDURE — 3008F BODY MASS INDEX DOCD: CPT | Performed by: NURSE PRACTITIONER

## 2024-11-15 RX ORDER — FAMOTIDINE 20 MG/1
20 TABLET, FILM COATED ORAL 2 TIMES DAILY
Qty: 120 TABLET | Refills: 0 | Status: SHIPPED | OUTPATIENT
Start: 2024-11-15 | End: 2025-01-14

## 2024-11-15 NOTE — TELEPHONE ENCOUNTER
Scheduled for:  Colonoscopy [id number] [id number]   Provider Name:  Dr. Bullock   Date:  3/5/2025  Location:    [facility]  Sedation:  mac  Time:  1:15 (pt is aware that ENDO will call the day before to confirm arrival time)  Prep:  Golytely   Meds/Allergies Reconciled?:  Physician reviewed   Diagnosis with codes:  change in bowel habits R19.4, constipation K59.00, GERD K21.9 , dysphagia R13.19  Was patient informed to call insurance with codes (Y/N):  Yes, I confirmed bcbs  insurance with the patient.   Referral sent?:  Referral was sent at the time of electronic surgical scheduling.  EM or [facility] notified?:  I sent an electronic request to Endo Scheduling and received a confirmation today.   Medication Orders:  This patient verbally confirmed that she is not taking:   Iron, blood thinners, BP meds, and is not diabetic   Not latex allergy, Not PCN allergy and does not have a pacemaker  Misc Orders:     I discussed the prep instructions with the patient which she  verbally understood and is aware that I will mychart the instructions today.    Further instructions given by staff:

## 2024-11-15 NOTE — TELEPHONE ENCOUNTER
Patient was seen in office today and needs to be schedule per orders below:     Schedule colonoscopy + EGD with [facility] Endoscopist  Diagnosis: change in bowel habits, constipation, GERD, dysphagia   Sedation: MAC  Prep: split dose golytely    Thank you!

## 2024-11-15 NOTE — PATIENT INSTRUCTIONS
Recommendations:  -Start Miralax 17g per day, titrate up or down for ideal frequency   -Increase water intake to 64oz of water per day  -Increase fiber to 20-30 g per day with fruits, vegetables and whole grains  -Increase exercise to 150 mins/week  -Do not ignore urge to defecate  -start use of squatty  -Avoid artifical sugars  -If bowel habits change or constipation worsens, call our office sooner   -follow up with Bakari after scopes      -Continue Pepcid twice daily for at least 1 month         1. Schedule colonoscopy + EGD with [facility] Endoscopist  Diagnosis: change in bowel habits, constipation, GERD, dysphagia   Sedation: MAC  Prep: split dose golytely    2.  bowel prep from pharmacy   You can pick the bowel prep up now and store in a cool, dry place in your home until your scheduled bowel prep start date.    3. Continue all medications as normal for your procedure. DO NOT TAKE: Any form of alcohol, recreational drugs and any forms of erectile dysfunction medications 24 hours prior to procedure.    4. Read all bowel prep instructions carefully. Bowel prep instructions can also be found online at:  www.[facility].org/giprep     5. AVOID seeds, nuts, popcorn, raw fruits and vegetables for 5 days before procedure    6. If you start any NEW medication after your visit today, please notify us. Certain medications (like iron or weight loss medications) will need to be held before the procedure, or the procedure cannot be performed safely.

## 2024-11-15 NOTE — H&P
[facility] - Gastroenterology                                                                                                               Reason for consult: GERD    Requesting physician or provider: Mayuri Buck MD    Chief Complaint   Patient presents with    ER F/U     Bloating, GERD, belching, change in bowel habits.       HPI:   Renetta Milian is a 39 year old year-old female with medical history of GERD and allergies      she is here today for evaluation of GERD symptoms. Was on Omeprazole on and off in the past, feels it causes constipation rather than help with reflux. Famotidine and tums tend to help more. This past week has been belching more, having some difficulty swallowing at times- worse with acidic foods. Constipation seems to have caused a hemorrhoid. Has some rectal itching and burning, getting better with prep h wipes. Usually has one bowel movement in the AM but is not having a lot of smaller ones throughout the day. Stopped breastfeeding in the past couple of months and has caused a lot of ups and downs within her body.     Has been seen by PCP with negative h pylori and ultrasound in the past year.     Patient denies symptoms of nausea, vomiting, dyspepsia, dysphagia, odynophagia, globus sensation, heartburn, hematemesis, abdominal pain, change in bowel habits, constipation, diarrhea, hematochezia, or melena. she denies recent change in appetite, fever or unintentional weight loss.      Last colonoscopy: none  Last EGD: none    NSAIDS: Ibuprofen a couple times a week   Tobacco: none   Alcohol: none   Marijuana: none   Illicit drugs: noen    FH GI malignancy: maternal aunt colon cancer   FH IBD: none     No history of adverse reaction to sedation  No JULIANNE- has sleep study next week   No anticoagulants/antiplatelet  No pacemaker/defibrillator    Wt Readings from Last 6 Encounters:   11/15/24  220 lb (99.8 kg)   10/11/24 220 lb (99.8 kg)   10/10/24 220 lb (99.8 kg)   10/07/24 220 lb (99.8 kg)   24 216 lb (98 kg)   24 222 lb 14.4 oz (101.1 kg)        History, Medications, Allergies, ROS:      Past Medical History:    Decorative tattoo    HPV in female    Thyroid cyst    goiter    Varicella    Had chicken pox during childhood      Past Surgical History:   Procedure Laterality Date    Colposcopy, cervix w/upper adjacent vagina; w/biopsy(s), cervix      Leep        Family Hx:   Family History   Problem Relation Age of Onset    Prostate Cancer Father     Breast Cancer Paternal Aunt 60        unsure of age    Colon Cancer Maternal Aunt       Social History:   Social History     Socioeconomic History    Marital status:    Tobacco Use    Smoking status: Former     Current packs/day: 0.00     Average packs/day: 0.5 packs/day for 15.0 years (7.5 ttl pk-yrs)     Types: Cigarettes     Start date: 3/17/2002     Quit date: 3/17/2017     Years since quittin.6     Passive exposure: Past    Smokeless tobacco: Never   Vaping Use    Vaping status: Never Used   Substance and Sexual Activity    Alcohol use: Not Currently     Comment: SOCIALLY    Drug use: No    Sexual activity: Yes     Partners: Male   Other Topics Concern    Caffeine Concern Yes     Comment: Coffee 1 cup daily    Pt has a pacemaker No    Pt has a defibrillator No    Breast feeding No    Reaction to local anesthetic No     Social Drivers of Health     Financial Resource Strain: Low Risk  (2023)    Financial Resource Strain     Difficulty of Paying Living Expenses: Not hard at all     Med Affordability: No   Food Insecurity: No Food Insecurity (2023)    Food Insecurity     Food Insecurity: Never true   Transportation Needs: No Transportation Needs (2023)    Transportation Needs     Lack of Transportation: No   Stress: No Stress Concern Present (2023)    Stress     Feeling of Stress : No   Housing Stability: Low  Risk  (5/28/2023)    Housing Stability     Housing Instability: No        Medications (Active prior to today's visit):  Current Outpatient Medications   Medication Sig Dispense Refill    famotidine 20 MG Oral Tab Take 1 tablet (20 mg total) by mouth 2 (two) times daily. 120 tablet 0    polyethylene glycol, PEG 3350-KCl-NaBcb-NaCl-NaSulf, 236 g Oral Recon Soln Take 4,000 mL by mouth As Directed. Take 2,000 mL the night before your procedure and 2,000 mL the morning of your procedure. 1 each 0    Tretinoin 0.05 % External Cream Apply a small amount to acne on face as directed at bedtime. 20 g 1    Multiple Vitamins-Minerals (WOMENS MULTIVITAMIN) Oral Tab       Cholecalciferol (VITAMIN D) 1000 units Oral Tab Take 1,000 Units by mouth daily.        fluticasone furoate-vilanterol (BREO ELLIPTA) 100-25 MCG/ACT Inhalation Aerosol Powder, Breath Activated Inhale 1 puff into the lungs daily. Rinse your mouth with water without swallowing after using BREO to help reduce your chance of getting thrush. 60 each 5    Omeprazole 40 MG Oral Capsule Delayed Release Take 1 capsule (40 mg total) by mouth daily as needed. 90 capsule 0       Allergies:  Allergies[1]    ROS:   CONSTITUTIONAL: negative for fevers, chills, sweats  EYES Negative for scleral icterus or redness, and diplopia  HEENT: Negative for hoarseness  RESPIRATORY: Negative for cough and severe shortness of breath  CARDIOVASCULAR: Negative for crushing sub-sternal chest pain  GASTROINTESTINAL: See HPI  GENITOURINARY: Negative for dysuria  MUSCULOSKELETAL: Negative for arthralgias and myalgias  SKIN: Negative for jaundice, rash or pruritus  NEUROLOGICAL: Negative for dizziness and headaches  BEHAVIOR/PSYCH: Negative for psychotic behavior    PHYSICAL EXAM:   Blood pressure 110/73, pulse 83, height 5' 7\" (1.702 m), weight 220 lb (99.8 kg), last menstrual period 11/07/2024, currently breastfeeding.    GEN: Alert, no acute distress, well-nourished   HEENT: anicteric sclera,  neck supple, trachea midline, MMM, no palpable or tender neck or supraclavicular lymph nodes  CV: RRR, the extremities are warm and well perfused   LUNGS: No increased work of breathing, CTAB  ABDOMEN: Soft, symmetrical, non-tender without distention or guarding. No scars or lesions. Aorta is without bruit or visible pulsation. Umbilicus is midline without herniation. Normoactive bowel sounds are present, No masses, hepatomegaly or splenomegaly noted.  MSK: No erythema, no warmth, no swelling of joints  SKIN: No jaundice, no erythema, no rashes, no lesions  HEMATOLOGIC: No bleeding, no bruising  NEURO: Alert and interactive, REYES  PSYCH: appropriate mood & affect    Labs/Imaging/Procedures:   11/26/23:   PROCEDURE: US ABDOMEN COMPLETE (CPT=76700)     COMPARISON: [facility], US PELVIS W EV (CPT=76856/81429), 1/29/2021, 12:59 PM.  [facility], US ABDOMEN COMPLETE (CPT=76700), 12/01/2020, 7:22 AM.     INDICATIONS: Epigastric pain     TECHNIQUE:   The abdomen was evaluated with grayscale and colorflow of the main vessels.       FINDINGS:  LIVER: Liver measures 15.5 cm in length.  Normal size, echotexture and colorflow.  No significant masses. Color flow and Doppler imaging of portal and hepatic veins show patency and antegrade flow.  GALLBLADDER: Normal.  Normal size.  No calculi, wall thickening or pericholecystic fluid.  Sonographic Pandya's sign was not elicited.    BILE DUCTS: Normal.  Common bile duct measures 5 mm.    PANCREAS: Visualized pancreas unremarkable.  SPLEEN: Normal echotexture.  Spleen length measures 12.6 cm.  KIDNEYS: Normal.  No mass, obstruction or obvious calculus.  The right kidney length measures 11.6 cm.  The left kidney length measures 11.4 cm.    AORTA/VASCULAR: Normal.  No aneurysm.  Patent IVC.    OTHER: Negative.  No ascites or adenopathy seen.                 Impression   CONCLUSION:     Mild splenomegaly.  Otherwise  unremarkable ultrasound of the abdomen.          .  ASSESSMENT/PLAN:   39 year old female presents for worsening GERD and change in bowel habits.    Discussed symptoms most likely related to pelvic floor/hormonal changes related to pregnancy and breastfeeding. Constipation may be causing worsening GERD. Will trial daily Miralax and take Famotidine BID for at least one month. Will schedule EGD and colonoscopy, if symptoms improve ok to cancel. Follow up after or as needed.     1. Change in bowel habits    2. Gastroesophageal reflux disease, unspecified whether esophagitis present    3. Constipation, unspecified constipation type           Patient Instructions   Recommendations:  -Start Miralax 17g per day, titrate up or down for ideal frequency   -Increase water intake to 64oz of water per day  -Increase fiber to 20-30 g per day with fruits, vegetables and whole grains  -Increase exercise to 150 mins/week  -Do not ignore urge to defecate  -start use of squatty  -Avoid artifical sugars  -If bowel habits change or constipation worsens, call our office sooner   -follow up with Bakari after scopes      -Continue Pepcid twice daily for at least 1 month         1. Schedule colonoscopy + EGD with [facility] Endoscopist  Diagnosis: change in bowel habits, constipation, GERD, dysphagia   Sedation: MAC  Prep: split dose golytely    2.  bowel prep from pharmacy   You can pick the bowel prep up now and store in a cool, dry place in your home until your scheduled bowel prep start date.    3. Continue all medications as normal for your procedure. DO NOT TAKE: Any form of alcohol, recreational drugs and any forms of erectile dysfunction medications 24 hours prior to procedure.    4. Read all bowel prep instructions carefully. Bowel prep instructions can also be found online at:  www.[facility].org/giprep     5. AVOID seeds, nuts, popcorn, raw fruits and vegetables for 5 days before procedure    6. If you start any NEW  medication after your visit today, please notify us. Certain medications (like iron or weight loss medications) will need to be held before the procedure, or the procedure cannot be performed safely.             Orders This Visit:  No orders of the defined types were placed in this encounter.      Meds This Visit:  Requested Prescriptions     Signed Prescriptions Disp Refills    famotidine 20 MG Oral Tab 120 tablet 0     Sig: Take 1 tablet (20 mg total) by mouth 2 (two) times daily.    polyethylene glycol, PEG 3350-KCl-NaBcb-NaCl-NaSulf, 236 g Oral Recon Soln 1 each 0     Sig: Take 4,000 mL by mouth As Directed. Take 2,000 mL the night before your procedure and 2,000 mL the morning of your procedure.       Imaging & Referrals:  None      CYRUS Vela    [facility]  11/15/2024               [1] No Known Allergies

## 2024-11-18 NOTE — PROGRESS NOTES
Renetta Milian is a 39 year old female.  HPI:     CC:    Chief Complaint   Patient presents with    Full Skin Exam     LOV 10/25/23. Pt presents for FBSE.   Pt is having Keratosis Pilaris on bilateral arms that is not going away. Using OTC scrub    Pt is also having rough patches on rt/left jaw area since few mths. Using OTC skincare    Pt also has a mole on Rt upper thigh since many years but since 1 yr has been sensitive and potruding outwards.      PT denies personal/family hx of Skin Ca         Allergies:  Patient has no known allergies.    HISTORY:    Past Medical History:    Decorative tattoo    HPV in female    Thyroid cyst    goiter    Varicella    Had chicken pox during childhood      Past Surgical History:   Procedure Laterality Date    Colposcopy, cervix w/upper adjacent vagina; w/biopsy(s), cervix      Leep        Family History   Problem Relation Age of Onset    Prostate Cancer Father     Breast Cancer Paternal Aunt 60        unsure of age    Colon Cancer Maternal Aunt       Social History     Socioeconomic History    Marital status:    Tobacco Use    Smoking status: Former     Current packs/day: 0.00     Average packs/day: 0.5 packs/day for 15.0 years (7.5 ttl pk-yrs)     Types: Cigarettes     Start date: 3/17/2002     Quit date: 3/17/2017     Years since quittin.6     Passive exposure: Past    Smokeless tobacco: Never   Vaping Use    Vaping status: Never Used   Substance and Sexual Activity    Alcohol use: Not Currently     Comment: SOCIALLY    Drug use: No    Sexual activity: Yes     Partners: Male   Other Topics Concern    Caffeine Concern Yes     Comment: Coffee 1 cup daily    Pt has a pacemaker No    Pt has a defibrillator No    Breast feeding No    Reaction to local anesthetic No     Social Drivers of Health     Financial Resource Strain: Low Risk  (2023)    Financial Resource Strain     Difficulty of Paying Living Expenses: Not hard at all     Med Affordability: No   Food  Insecurity: No Food Insecurity (5/28/2023)    Food Insecurity     Food Insecurity: Never true   Transportation Needs: No Transportation Needs (5/28/2023)    Transportation Needs     Lack of Transportation: No   Stress: No Stress Concern Present (5/28/2023)    Stress     Feeling of Stress : No   Housing Stability: Low Risk  (5/28/2023)    Housing Stability     Housing Instability: No          Current Outpatient Medications   Medication Sig Dispense Refill    Tretinoin 0.05 % External Cream Apply a small amount to acne on face as directed at bedtime. 20 g 1    Multiple Vitamins-Minerals (WOMENS MULTIVITAMIN) Oral Tab       Cholecalciferol (VITAMIN D) 1000 units Oral Tab Take 1,000 Units by mouth daily.        famotidine 20 MG Oral Tab Take 1 tablet (20 mg total) by mouth 2 (two) times daily. 120 tablet 0    polyethylene glycol, PEG 3350-KCl-NaBcb-NaCl-NaSulf, 236 g Oral Recon Soln Take 4,000 mL by mouth As Directed. Take 2,000 mL the night before your procedure and 2,000 mL the morning of your procedure. 1 each 0    fluticasone furoate-vilanterol (BREO ELLIPTA) 100-25 MCG/ACT Inhalation Aerosol Powder, Breath Activated Inhale 1 puff into the lungs daily. Rinse your mouth with water without swallowing after using BREO to help reduce your chance of getting thrush. 60 each 5    Omeprazole 40 MG Oral Capsule Delayed Release Take 1 capsule (40 mg total) by mouth daily as needed. 90 capsule 0     Allergies:   No Known Allergies    Past Medical History:    Decorative tattoo    HPV in female    Thyroid cyst    goiter    Varicella    Had chicken pox during childhood     Past Surgical History:   Procedure Laterality Date    Colposcopy, cervix w/upper adjacent vagina; w/biopsy(s), cervix      Leep  2015     Social History     Socioeconomic History    Marital status:      Spouse name: Not on file    Number of children: Not on file    Years of education: Not on file    Highest education level: Not on file   Occupational  History    Not on file   Tobacco Use    Smoking status: Former     Current packs/day: 0.00     Average packs/day: 0.5 packs/day for 15.0 years (7.5 ttl pk-yrs)     Types: Cigarettes     Start date: 3/17/2002     Quit date: 3/17/2017     Years since quittin.6     Passive exposure: Past    Smokeless tobacco: Never   Vaping Use    Vaping status: Never Used   Substance and Sexual Activity    Alcohol use: Not Currently     Comment: SOCIALLY    Drug use: No    Sexual activity: Yes     Partners: Male   Other Topics Concern     Service Not Asked    Blood Transfusions Not Asked    Caffeine Concern Yes     Comment: Coffee 1 cup daily    Occupational Exposure Not Asked    Hobby Hazards Not Asked    Sleep Concern Not Asked    Stress Concern Not Asked    Weight Concern Not Asked    Special Diet Not Asked    Back Care Not Asked    Exercise Not Asked    Bike Helmet Not Asked    Seat Belt Not Asked    Self-Exams Not Asked    Grew up on a farm Not Asked    History of tanning Not Asked    Outdoor occupation Not Asked    Pt has a pacemaker No    Pt has a defibrillator No    Breast feeding No    Reaction to local anesthetic No   Social History Narrative    Not on file     Social Drivers of Health     Financial Resource Strain: Low Risk  (2023)    Financial Resource Strain     Difficulty of Paying Living Expenses: Not hard at all     Med Affordability: No   Food Insecurity: No Food Insecurity (2023)    Food Insecurity     Food Insecurity: Never true   Transportation Needs: No Transportation Needs (2023)    Transportation Needs     Lack of Transportation: No   Physical Activity: Not on file   Stress: No Stress Concern Present (2023)    Stress     Feeling of Stress : No   Social Connections: Not on file   Housing Stability: Low Risk  (2023)    Housing Stability     Housing Instability: No     Housing Instability Emergency: Not on file     [place] or Tamiko: Not on file     Family History   Problem  Relation Age of Onset    Prostate Cancer Father     Breast Cancer Paternal Aunt 60        unsure of age    Colon Cancer Maternal Aunt        There were no vitals filed for this visit.    HPI:  Chief Complaint   Patient presents with    Full Skin Exam     LOV 10/25/23. Pt presents for FBSE.   Pt is having Keratosis Pilaris on bilateral arms that is not going away. Using OTC scrub    Pt is also having rough patches on rt/left jaw area since few mths. Using OTC skincare    Pt also has a mole on Rt upper thigh since many years but since 1 yr has been sensitive and potruding outwards.      PT denies personal/family hx of Skin Ca       routine follow-up concerned above  Personal history of skin cancer: No  Personal history of AK's: No  Personal history of dysplastic nevi: No  Family history of skin cancer: No      Patient presents with concerns above.    Patient has been in their usual state of health.     Past notes/ records and appropriate/relevant lab results including pathology and past body maps reviewed. Including outside notes/ PCP notes as appropriate. Updated and new information noted in current visit.     ROS:  Denies other relevant systemic complaints.      History, medications, allergies reviewed as noted.       Physical Examination:     Well-developed well-nourished patient alert oriented in no acute distress.  Exam performed, including scalp, head, neck, face,nails, hair, external eyes, including conjunctival mucosa, eyelids, lips external ears , arms, digits,palms.     Multiple light to medium brown, well marginated, uniformly pigmented, macules and papules 6 mm and less scattered on exam. pigmented lesions examined with dermoscopy benign-appearing patterns.     Waxy tannish keratotic papules scattered, cherry-red vascular papules scattered.    See map today's date for lesions noted .  See assessment and plan below for specific lesions.    Otherwise remarkable for lesions as noted on map.    See A/P  below  for additional information:    Assessment / plan:    No orders of the defined types were placed in this encounter.      Meds & Refills for this Visit:  Requested Prescriptions     Signed Prescriptions Disp Refills    Tretinoin 0.05 % External Cream 20 g 1     Sig: Apply a small amount to acne on face as directed at bedtime.         Encounter Diagnoses   Name Primary?    SK (seborrheic keratosis) Yes    Lentigo     Benign neoplasm of skin, unspecified location     Multiple benign nevi     Hemangioma of skin and subcutaneous tissue     History of sun-damaged skin     Acne vulgaris      New areas of acne on face forehead, jaw line  Acne. See medications in grid.  Skin care regimen discussed at length including cleansers, makeup, face washing, sunscreen.  Recheck in 6 -8 weeks if no improvement.  Notify us promptly if problems tolerating regimen.  Consider more aggressive therapy if not responding.  Tretinoin nightly increasing to nightly as tolerated  Retinoids: Application instructions reviewed gentle cleanser, over moisturizer.  Hyaluronic acid continue moisturizers may be helpful at reducing irritation.  Start 2-3 times weekly slowly titrate up, increase to nightly as tolerated May need to decrease use with cold weather.    Multiple benign keratoses fibrous papules, benign nevi reassurance  Lesion left breast waxy tan papule consistent benign seborrheic keratosis,     small cyst at inframammary crease, monitor presently consider referral for excision if cyst size become more bothersome   scattered nevi over the back, benign-appearing  Mild parietal scalp intradermal nevus no atypia 8/22    Numerous benign-appearing nevi  No atypical appearing lesions.  Scattered nevi.    Waxy tan papule right periauricular cheek observe.  Papule left jawline observe.  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  Moderate sun damage.  Continue sunscreen sun  protection    Please refer to map for specific lesions.  See additional diagnoses.  Pros cons of various therapies, risks benefits discussed.Pathophysiology discussed with patient.  Therapeutic options reviewed.  See  Medications in grid.  Instructions reviewed at length.    Benign nevi, seborrheic  keratoses, cherry angiomas:  Reassurance regarding other benign skin lesions.Signs and symptoms of skin cancer, ABCDE's of melanoma discussed with patient. Sunscreen use, sun protection, self exams reviewed.  Followup as noted RTC ---routine checkup   -one year or p.r.n.    Encounter Times   Including precharting, reviewing chart, prior notes obtaining history: 10 minutes, medical exam :10 minutes, notes on body map, plan, counseling 10minutes My total time spent caring for the patient on the day of the encounter: 30 minutes     The patient indicates understanding of these issues and agrees to the plan.  The patient is asked to return as noted in follow-up/ above.    This note was generated using Dragon voice recognition software.  Please contact me regarding any confusion resulting from errors in recognition..

## 2024-11-21 ENCOUNTER — OFFICE VISIT (OUTPATIENT)
Dept: SLEEP CENTER | Age: 39
End: 2024-11-21
Attending: INTERNAL MEDICINE
Payer: COMMERCIAL

## 2024-11-21 DIAGNOSIS — R06.83 SNORES: ICD-10-CM

## 2024-11-21 PROCEDURE — 95806 SLEEP STUDY UNATT&RESP EFFT: CPT

## 2024-11-23 NOTE — PROCEDURES
[facility]  Accredited by the American Academy of Sleep Medicine (AASM)    PATIENT'S NAME: TAMMY DE LA VEGA   ATTENDING PHYSICIAN: CHERI Buck   REFERRING PHYSICIAN: CHERI Buck   PATIENT ACCOUNT #: [id number] LOCATION: Sleep Center   MEDICAL RECORD #: [id number] YOB: 1985   DATE OF STUDY: 11/21/2024       SLEEP STUDY REPORT    STUDY TYPE:  Home sleep test.    INDICATION:  Suspected obstructive sleep apnea (ICD-10 code G47.33) in patient with snoring, witnessed apneic events, body mass index 34.5, and an Bentley Sleepiness Scale score of 5/24.    RESULTS:  The patient underwent home sleep test with measurement of her nasal air flow, nasal air pressure, snoring, chest and abdominal wall motion, oximetry, and body position.  I have reviewed the entirety of the raw data of this study.  During this study, total recording time is 521 minutes.  The lights-out clock time is 9:49 p.m., the lights-on clock time is 6:31 a.m.  The apnea plus hypopnea index is 3.4 events per hour.  The supine apnea plus hypopnea index is 8.1 events per hour.  The average oxygen saturation is 95%, the lowest oxygen saturation is 91%, and the patient spent 0% of the test with saturations 88% or less.  The average heart rate is 71 beats per minute.  The patient spent approximately 20% of the test in the supine position.    INTERPRETATION:  The data generated from this study does not demonstrate significant sleep disordered breathing.    RECOMMENDATIONS:    1.   Clinical correlation is required.  2.   Weight loss.  3.   Avoid alcohol and sedating drug.  4.   The patient should not drive if at all sleepy.    Please do not hesitate to contact me if there is any question whatsoever regarding interpretation of this study.    Dictated By Hayden Mayo MD  d: 11/23/2024 13:58:38  t: 11/23/2024 16:12:27  [facility] [id number]  [facility]/    cc: Mayuri Buck MD

## 2024-11-26 ENCOUNTER — OFFICE VISIT (OUTPATIENT)
Dept: OBGYN CLINIC | Facility: CLINIC | Age: 39
End: 2024-11-26
Payer: COMMERCIAL

## 2024-11-26 VITALS
SYSTOLIC BLOOD PRESSURE: 111 MMHG | HEART RATE: 102 BPM | DIASTOLIC BLOOD PRESSURE: 76 MMHG | BODY MASS INDEX: 34.21 KG/M2 | HEIGHT: 67 IN | WEIGHT: 218 LBS

## 2024-11-26 DIAGNOSIS — Z01.419 WELL WOMAN EXAM: Primary | ICD-10-CM

## 2024-11-26 DIAGNOSIS — R10.2 PELVIC PAIN: ICD-10-CM

## 2024-11-26 DIAGNOSIS — N39.3 SUI (STRESS URINARY INCONTINENCE, FEMALE): ICD-10-CM

## 2024-11-26 DIAGNOSIS — Z12.31 SCREENING MAMMOGRAM, ENCOUNTER FOR: ICD-10-CM

## 2024-11-26 PROCEDURE — 3008F BODY MASS INDEX DOCD: CPT | Performed by: OBSTETRICS & GYNECOLOGY

## 2024-11-26 PROCEDURE — 3074F SYST BP LT 130 MM HG: CPT | Performed by: OBSTETRICS & GYNECOLOGY

## 2024-11-26 PROCEDURE — 99395 PREV VISIT EST AGE 18-39: CPT | Performed by: OBSTETRICS & GYNECOLOGY

## 2024-11-26 PROCEDURE — 99212 OFFICE O/P EST SF 10 MIN: CPT | Performed by: OBSTETRICS & GYNECOLOGY

## 2024-11-26 PROCEDURE — 3078F DIAST BP <80 MM HG: CPT | Performed by: OBSTETRICS & GYNECOLOGY

## 2024-11-26 NOTE — PROGRESS NOTES
Chief Complaint   Patient presents with    Gyn Exam     annual, patient states discoloration on left breast, as well as on and off pain in ovaries.          HPI:  The patient is a 40 yo F here for WWE.  Cycles monthly . Having ovarian pain; unsure if during ovulation.   with vasectomy . Patient requesting mammogram--willing to pay out of pocket.  Having urinary and stool incontinence.      Patient's last menstrual period was 2024 (exact date).        Latest Ref Rng & Units 2023     1:22 PM 2022    10:27 AM 10/11/2021     1:35 PM 2020     7:31 PM 2019     1:36 PM 2019     1:30 PM 3/20/2017     9:46 AM   RECENT PAP RESULTS   INTERPRETATION/RESULT: Negative for intraepithelial lesion or malignancy Negative for intraepithelial lesion or malignancy  Negative for intraepithelial lesion or malignancy  Negative for intraepithelial lesion or malignancy  Negative for intraepithelial lesion or malignancy  Negative for intraepithelial lesion or malignancy  Unsatisfactory for Evaluation  Negative for intraepithelial lesion or malignancy    HPV Negative Negative  Negative  Negative  Negative  Negative  Negative  Negative         Pap Date: 23  Pap Result Notes: NEG PAP / NEG HPV  Follow Up Recommendation: MAMMO 10/18/21 NEG      Chaperone: declines      Depression Screening (PHQ-2/PHQ-9): Over the LAST 2 WEEKS   Little interest or pleasure in doing things (over the last two weeks)?: Not at all    Feeling down, depressed, or hopeless (over the last two weeks)?: Not at all    PHQ-2 SCORE: 0          Reviewed medical and surgical history below       OBSTETRICS HISTORY:  OB History    Para Term  AB Living   4 3 3 0 1 3   SAB IAB Ectopic Multiple Live Births   0 0 0 0 3       GYNE HISTORY:  History   Sexual Activity    Sexual activity: Yes    Partners: Male            MEDICAL HISTORY:  Past Medical History:    Decorative tattoo    HPV in female    Thyroid cyst    goiter     Varicella    Had chicken pox during childhood       SURGICAL HISTORY:  Past Surgical History:   Procedure Laterality Date    Colposcopy, cervix w/upper adjacent vagina; w/biopsy(s), cervix      Leep         SOCIAL HISTORY:  Social History     Socioeconomic History    Marital status:      Spouse name: Not on file    Number of children: Not on file    Years of education: Not on file    Highest education level: Not on file   Occupational History    Not on file   Tobacco Use    Smoking status: Former     Current packs/day: 0.00     Average packs/day: 0.5 packs/day for 15.0 years (7.5 ttl pk-yrs)     Types: Cigarettes     Start date: 3/17/2002     Quit date: 3/17/2017     Years since quittin.7     Passive exposure: Past    Smokeless tobacco: Never   Vaping Use    Vaping status: Never Used   Substance and Sexual Activity    Alcohol use: Not Currently     Comment: SOCIALLY    Drug use: No    Sexual activity: Yes     Partners: Male   Other Topics Concern     Service Not Asked    Blood Transfusions Not Asked    Caffeine Concern Yes     Comment: Coffee 1 cup daily    Occupational Exposure Not Asked    Hobby Hazards Not Asked    Sleep Concern Not Asked    Stress Concern Not Asked    Weight Concern Not Asked    Special Diet Not Asked    Back Care Not Asked    Exercise Not Asked    Bike Helmet Not Asked    Seat Belt Not Asked    Self-Exams Not Asked    Grew up on a farm Not Asked    History of tanning Not Asked    Outdoor occupation Not Asked    Pt has a pacemaker No    Pt has a defibrillator No    Breast feeding No    Reaction to local anesthetic No   Social History Narrative    Not on file     Social Drivers of Health     Financial Resource Strain: Low Risk  (2023)    Financial Resource Strain     Difficulty of Paying Living Expenses: Not hard at all     Med Affordability: No   Food Insecurity: No Food Insecurity (2023)    Food Insecurity     Food Insecurity: Never true   Transportation Needs:  No Transportation Needs (5/28/2023)    Transportation Needs     Lack of Transportation: No   Physical Activity: Not on file   Stress: No Stress Concern Present (5/28/2023)    Stress     Feeling of Stress : No   Social Connections: Not on file   Housing Stability: Low Risk  (5/28/2023)    Housing Stability     Housing Instability: No     Housing Instability Emergency: Not on file     [place]rib or [place]: Not on file       FAMILY HISTORY:  Family History   Problem Relation Age of Onset    Other (alzheimers) Mother     Prostate Cancer Father     Colon Cancer Maternal Aunt     Breast Cancer Paternal Aunt 60        unsure of age       MEDICATIONS:    Current Outpatient Medications:     famotidine 20 MG Oral Tab, Take 1 tablet (20 mg total) by mouth 2 (two) times daily., Disp: 120 tablet, Rfl: 0    polyethylene glycol, PEG 3350-KCl-NaBcb-NaCl-NaSulf, 236 g Oral Recon Soln, Take 4,000 mL by mouth As Directed. Take 2,000 mL the night before your procedure and 2,000 mL the morning of your procedure., Disp: 1 each, Rfl: 0    Tretinoin 0.05 % External Cream, Apply a small amount to acne on face as directed at bedtime., Disp: 20 g, Rfl: 1    fluticasone furoate-vilanterol (BREO ELLIPTA) 100-25 MCG/ACT Inhalation Aerosol Powder, Breath Activated, Inhale 1 puff into the lungs daily. Rinse your mouth with water without swallowing after using BREO to help reduce your chance of getting thrush., Disp: 60 each, Rfl: 5    Omeprazole 40 MG Oral Capsule Delayed Release, Take 1 capsule (40 mg total) by mouth daily as needed., Disp: 90 capsule, Rfl: 0    Multiple Vitamins-Minerals (WOMENS MULTIVITAMIN) Oral Tab, , Disp: , Rfl:     Cholecalciferol (VITAMIN D) 1000 units Oral Tab, Take 1,000 Units by mouth daily.  , Disp: , Rfl:     ALLERGIES:  Allergies[1]      Review of Systems:  Constitutional:  Denies fevers and chills   Cardiovascular:  denies chest pain or palpitations  Respiratory:  denies shortness of breath  Gastrointestinal:  denies  heartburn, abdominal pain, diarrhea or constipation  Genitourinary:  denies dysuria, incontinence, abnormal vaginal discharge, vaginal itching  Musculoskeletal:  denies back pain.  Skin/Breast:  Denies any breast pain, lumps, or discharge.   Neurological:  denies headaches, extremity weakness  Psychiatric: denies depression or anxiety.      Vitals:    11/26/24 0853   BP: 111/76   Pulse: 102       PHYSICAL EXAM:   Constitutional: well developed, well nourished  Head/Face: normocephalic  Neck/Thyroid: thyroid symmetric, no thyromegaly, no nodules, no adenopathy  Heart: Regular rate and rhythm   Lungs: clear to ascultation bilaterally   Lymphatic:no abnormal supraclavicular or axillary adenopathy is noted  Breast: normal without palpable masses, tenderness, asymmetry, nipple discharge, nipple retraction or skin changes  Abdomen:  soft, nontender, nondistended, no masses  Skin/Hair: no unusual rashes or bruises  Extremities: no edema, no cyanosis  Psychiatric: Appropriate mood and affect    Pelvic Exam:  External Genitalia: normal appearance, hair distribution, and no lesions  Urethral Meatus:  normal in size, location, without lesions and prolapse  Bladder:  No fullness, masses or tenderness  Vagina:  Normal appearance without lesions, no abnormal discharge  Cervix:  Normal without tenderness on motion  Uterus: normal in size, contour, position, mobility, without tenderness  Adnexa: normal without masses or tenderness  Perineum: normal    Assessment/Plan:  Renetta was seen today for gyn exam.    Diagnoses and all orders for this visit:    Well woman exam    Screening mammogram, encounter for  -     [facility] CHERELLE 2D+3D SCREENING BILAT (CPT=77067/06508); Future    SHO (stress urinary incontinence, female)  -     Pelvic Floor Therapy - [place] Location    Pelvic pain  -     US PELVIS W EV (CPT=76856/63177); Future        WWE:   Reviewed ASCCP guidelines with the patient -- Pap UTD  Contraception: vasectomy  PFPT for SHO/stool  incontinence  Pelvic US for ovarian pain--encouraged diary as I suspect due to ovulation   Breast Health:     Mammo @ 41 yo---ordered at patient request  Encouraged monthly self breast exams with the patient   Discussed diet, exercise, MVIs with Vit D  Follow up in 1 yr for WWE             [1] No Known Allergies

## 2024-11-27 ENCOUNTER — OFFICE VISIT (OUTPATIENT)
Dept: FAMILY MEDICINE CLINIC | Facility: CLINIC | Age: 39
End: 2024-11-27
Payer: COMMERCIAL

## 2024-11-27 VITALS
SYSTOLIC BLOOD PRESSURE: 125 MMHG | HEIGHT: 67 IN | BODY MASS INDEX: 34.06 KG/M2 | WEIGHT: 217 LBS | DIASTOLIC BLOOD PRESSURE: 75 MMHG | HEART RATE: 58 BPM

## 2024-11-27 DIAGNOSIS — L23.9 ALLERGIC DERMATITIS: Primary | ICD-10-CM

## 2024-11-27 PROCEDURE — 3078F DIAST BP <80 MM HG: CPT | Performed by: PHYSICIAN ASSISTANT

## 2024-11-27 PROCEDURE — 99213 OFFICE O/P EST LOW 20 MIN: CPT | Performed by: PHYSICIAN ASSISTANT

## 2024-11-27 PROCEDURE — 3074F SYST BP LT 130 MM HG: CPT | Performed by: PHYSICIAN ASSISTANT

## 2024-11-27 PROCEDURE — 3008F BODY MASS INDEX DOCD: CPT | Performed by: PHYSICIAN ASSISTANT

## 2024-11-27 NOTE — PROGRESS NOTES
HPI:     HPI  A 39-year-old female presents with rashes on her face for a few days. The rash is red but not itchy or painful. The patient has been using facial products and a vitamin C serum.    Medications:     Current Outpatient Medications   Medication Sig Dispense Refill    famotidine 20 MG Oral Tab Take 1 tablet (20 mg total) by mouth 2 (two) times daily. 120 tablet 0    polyethylene glycol, PEG 3350-KCl-NaBcb-NaCl-NaSulf, 236 g Oral Recon Soln Take 4,000 mL by mouth As Directed. Take 2,000 mL the night before your procedure and 2,000 mL the morning of your procedure. 1 each 0    Tretinoin 0.05 % External Cream Apply a small amount to acne on face as directed at bedtime. 20 g 1    Multiple Vitamins-Minerals (WOMENS MULTIVITAMIN) Oral Tab       Cholecalciferol (VITAMIN D) 1000 units Oral Tab Take 1,000 Units by mouth daily.           Allergies:   Allergies[1]    History:     Health Maintenance   Topic Date Due    COVID-19 Vaccine (4 - 2024-25 season) 09/01/2024    Annual Physical  07/08/2025    Pap Smear  11/21/2026    DTaP,Tdap,and Td Vaccines (4 - Td or Tdap) 04/14/2033    Influenza Vaccine  Completed    Annual Depression Screening  Completed    Pneumococcal Vaccine: Birth to 64yrs  Aged Out       Patient's last menstrual period was 11/08/2024 (exact date).   Past Medical History:     Past Medical History:    Decorative tattoo    HPV in female    Thyroid cyst    goiter    Varicella    Had chicken pox during childhood       Past Surgical History:     Past Surgical History:   Procedure Laterality Date    Colposcopy, cervix w/upper adjacent vagina; w/biopsy(s), cervix      Leep  2015       Family History:     Family History   Problem Relation Age of Onset    Other (alzheimers) Mother     Prostate Cancer Father     Colon Cancer Maternal Aunt     Breast Cancer Paternal Aunt 60        unsure of age       Social History:     Social History     Socioeconomic History    Marital status:      Spouse name: Not on  file    Number of children: Not on file    Years of education: Not on file    Highest education level: Not on file   Occupational History    Not on file   Tobacco Use    Smoking status: Former     Current packs/day: 0.00     Average packs/day: 0.5 packs/day for 15.0 years (7.5 ttl pk-yrs)     Types: Cigarettes     Start date: 3/17/2002     Quit date: 3/17/2017     Years since quittin.7     Passive exposure: Past    Smokeless tobacco: Never   Vaping Use    Vaping status: Never Used   Substance and Sexual Activity    Alcohol use: Not Currently     Comment: SOCIALLY    Drug use: No    Sexual activity: Yes     Partners: Male   Other Topics Concern     Service Not Asked    Blood Transfusions Not Asked    Caffeine Concern Yes     Comment: Coffee 1 cup daily    Occupational Exposure Not Asked    Hobby Hazards Not Asked    Sleep Concern Not Asked    Stress Concern Not Asked    Weight Concern Not Asked    Special Diet Not Asked    Back Care Not Asked    Exercise Not Asked    Bike Helmet Not Asked    Seat Belt Not Asked    Self-Exams Not Asked    Grew up on a farm Not Asked    History of tanning Not Asked    Outdoor occupation Not Asked    Pt has a pacemaker No    Pt has a defibrillator No    Breast feeding No    Reaction to local anesthetic No   Social History Narrative    Not on file     Social Drivers of Health     Financial Resource Strain: Low Risk  (2023)    Financial Resource Strain     Difficulty of Paying Living Expenses: Not hard at all     Med Affordability: No   Food Insecurity: No Food Insecurity (2023)    Food Insecurity     Food Insecurity: Never true   Transportation Needs: No Transportation Needs (2023)    Transportation Needs     Lack of Transportation: No   Physical Activity: Not on file   Stress: No Stress Concern Present (2023)    Stress     Feeling of Stress : No   Social Connections: Not on file   Housing Stability: Low Risk  (2023)    Housing Stability     Housing  Instability: No     Housing Instability Emergency: Not on file     Crib or Bassinette: Not on file       Review of Systems:   Review of Systems   Skin:  Positive for rash.        Vitals:    11/27/24 1545   BP: 125/75   Pulse: 58   Weight: 217 lb (98.4 kg)   Height: 5' 7\" (1.702 m)     Body mass index is 33.99 kg/m².    Physical Exam:   Physical Exam  Vitals reviewed.      There are skin irritation on cheeks, forehead. No tenderness or swelling.    Assessment and Plan::     Problem List Items Addressed This Visit    None  Visit Diagnoses       Allergic dermatitis    -  Primary        Advise the patient to avoid vitamin C serum. Apply facial lotion/cream.    Discussed plan of care with pt and pt is in agreement.All questions answered. Pt to call with questions or concerns.         [1] No Known Allergies

## 2024-12-02 ENCOUNTER — OFFICE VISIT (OUTPATIENT)
Dept: OTOLARYNGOLOGY | Facility: CLINIC | Age: 39
End: 2024-12-02
Payer: COMMERCIAL

## 2024-12-02 DIAGNOSIS — J30.9 ALLERGIC RHINITIS, UNSPECIFIED SEASONALITY, UNSPECIFIED TRIGGER: ICD-10-CM

## 2024-12-02 DIAGNOSIS — J32.8 OTHER CHRONIC SINUSITIS: Primary | ICD-10-CM

## 2024-12-02 DIAGNOSIS — G43.809 VESTIBULAR MIGRAINE: ICD-10-CM

## 2024-12-02 PROCEDURE — 31231 NASAL ENDOSCOPY DX: CPT | Performed by: OTOLARYNGOLOGY

## 2024-12-02 PROCEDURE — 99214 OFFICE O/P EST MOD 30 MIN: CPT | Performed by: OTOLARYNGOLOGY

## 2024-12-02 RX ORDER — AZELASTINE 1 MG/ML
2 SPRAY, METERED NASAL 2 TIMES DAILY
Qty: 30 ML | Refills: 1 | Status: SHIPPED | OUTPATIENT
Start: 2024-12-02

## 2024-12-02 NOTE — PROGRESS NOTES
PATIENT PROGRESS NOTE  OTOLOGY/OTOLARYNGOLOGY    REF MD:  No referring provider defined for this encounter.    PCP: Mayuri Buck MD    CHIEF COMPLAINT:    Chief Complaint   Patient presents with    Follow - Up     Patient is here for stuffy nose and congestion reports post nasal drip reports shortness of breath x 1 month      LAST VISIT 7/31/2024    IMPRESSION:  Normal bilateral eustachian tube function, normal hearing  Possible vestibular migraine - vertigo and ear fullness   No evidence of BPPV today    PLAN:  -Audiogram reviewed with patient   -Discussed most migraine preventative medications are compatible with breastfeeding but would require further risk/benefit discussion. Discussed that hormonal changes can contribute to migraine symptoms.  -Lifestyle changes including stress reduction, migraine diet (caffeine cessation), sleep hygiene, hydration, regular meals discussed  -Patient education: Migraine: More than a Headache  -Symptom diary  -Follow-up as needed if further treatment desired  __________________________________________________________________________________  Interval history: Finished physical therapy and feels good overall. When experiencing a headache, she feels somewhat off, but it’s not excessive. Recently, her nose has been very congested, initially thought to be allergies. The congestion persists with postnasal drip, throat clearing, shortness of breath, and difficulty getting a deep breath through her nose, although mouth breathing is otherwise okay. She was consistently using Flonase nasal spray but stopped due to concerns about rebound congestion. She has been using a neti pot and a humidifier as well. This has been ongoing for 1 month.         HISTORY OF PRESENT ILLNESS: Renetta Milian is a 39 year old female who presents for evaluation of positional dizziness, sinus congestion, and right ear fullness and pressure. On 7/8/24, she saw her PCP with ongoing congestion in right ear  and vertigo while shifting positions. Patient was recommended PT for the vertigo, and she was started on a 10-day course of Augmentin right acute suppurative otitis media.. She has a history of BPPV and chronic positional vertigo and has had recurrent residual dizziness since her initial episodes in 2021.  More recently, she has been experiencing vertigo for approximately the past 2 months, with the most recent onset of vertigo was 5/30/24 causing room spinning with position changes. States that this \"spinning\" sensation can occur when turning too quickly. Has occasional spinning while standing or bending, but at other times describes the dizziness as \"being on a boat.\"  One month prior to recent onset, patient has also been experiencing increased sinus pressure, ear pressure and fullness, and throat irritation and dryness due to seasonal allergies.  After this initial flare up, the allergies and congestion have subsided but she still complains of right ear congestion and vertigo when changing positions. Patient started PT on 7/24 and patient was positive for right posterior canal BPPV, with a need to rule out vestibular migraine and/or PPPD.      Once or twice a month pressure headache. Migraine in pregnancy. No family history of migraine, vertigo, hearing loss. Dad with tinnitus. No hx of ear infections, no hx of ear tubes, no otorrhea, no otalgia. Has been treated twice for BPPV since 2021. Patient is breastfeeding.    PAST MEDICAL HISTORY:    Past Medical History:    Decorative tattoo    HPV in female    Thyroid cyst    goiter    Varicella    Had chicken pox during childhood       PAST SURGICAL HISTORY:    Past Surgical History:   Procedure Laterality Date    Colposcopy, cervix w/upper adjacent vagina; w/biopsy(s), cervix      Leep  2015       Current Outpatient Medications on File Prior to Visit   Medication Sig Dispense Refill    famotidine 20 MG Oral Tab Take 1 tablet (20 mg total) by mouth 2 (two) times  daily. 120 tablet 0    polyethylene glycol, PEG 3350-KCl-NaBcb-NaCl-NaSulf, 236 g Oral Recon Soln Take 4,000 mL by mouth As Directed. Take 2,000 mL the night before your procedure and 2,000 mL the morning of your procedure. 1 each 0    Multiple Vitamins-Minerals (WOMENS MULTIVITAMIN) Oral Tab       Cholecalciferol (VITAMIN D) 1000 units Oral Tab Take 1,000 Units by mouth daily.        Tretinoin 0.05 % External Cream Apply a small amount to acne on face as directed at bedtime. 20 g 1     No current facility-administered medications on file prior to visit.       Allergies: No Known Allergies    SOCIAL HISTORY:    Social History     Tobacco Use    Smoking status: Former     Current packs/day: 0.00     Average packs/day: 0.5 packs/day for 15.0 years (7.5 ttl pk-yrs)     Types: Cigarettes     Start date: 3/17/2002     Quit date: 3/17/2017     Years since quittin.7     Passive exposure: Past    Smokeless tobacco: Never   Substance Use Topics    Alcohol use: Not Currently     Comment: SOCIALLY       FAMILY HISTORY: Denies known family history of hearing loss, tinnitus, vertigo, or migraine.  Denies known family history of head and neck cancer, thyroid cancer, bleeding disorders.     REVIEW OF SYSTEMS:   Positives are in bold  Neuro: Headache, facial weakness, facial numbness, neck pain, vertigo  ENT: Hearing change, tinnitus, otorrhea, otalgia, aural fullness, ear pressure, vertigo, imbalance  Sinus pressure, rhinorrhea, congestion, PND, facial pain, jaw pain, dysphagia, odynophagia, sore throat, voice changes, shortness of breath    EXAMINATION:  I washed my hands with an alcohol-based hand gel prior to examination  Constitutional:   --Vitals: Last menstrual period 2024, not currently breastfeeding.  General: no apparent distress, well-developed, conversant  Neuro: Cranial nerves: EOMI, Facial sensation intact to touch, palate elevates midline, tongue protrudes midline, shoulder shrug intact bilateral, facial  movement normal bilateral  Respiratory: No stridor, stertor or increased work of breathing  ENT:  --Nose: no external nasal deformity, anterior rhinoscopy: Rightward nasal septal deviation, bilateral inferior turbinate hypertrophy, mucosa healthy, no rhinorrhea  --Neck: No palpable cervical lymphadenopathy, no thyromegaly, no masses or lesions over the bilateral submandibular or parotid glands  --Ear: (bilateral ears were examined under binocular microscopy)  Right ear microscopic exam:  Pinna: Normal, no lesions or masses.  Mastoid: Nontender on palpation.   External auditory canal: Clear, no masses or lesions.  Tympanic membrane: Intact, no lesions, normal landmarks.  Middle ear: Aerated.    Left ear microscopic exam:  Pinna: Normal, no lesions or masses.  Mastoid: Nontender on palpation.   External auditory canal: Clear, no masses or lesions.  Tympanic membrane: Intact, no lesions, normal landmarks.  Middle ear: Aerated.    Vestibular examination (7/31/2024):   Hallpike right: negative, Hallpike left: negative      Latest Audiogram Result (Hz) Exam performed: 7/31/2024 1:38 PM Last edited by Holli Barker Au.D on 7/31/2024 1:42 PM        125 250  1500 2000 3000 4000 6000 8000    Right air:  10 5  5  5  5  0    Left air:  10 10  10  5  0  5       Reliability:  Fair    Transducer:  Inserts    Technique:  Conventional Audiometry    Comments:            Latest Speech Audiometry  Last edited by Holli Barker Au.D on 7/31/2024 1:42 PM       Ear Method PTA SAT SRT [facility] Test/list Score (%) Intensity Mask/noise Notes    right live voice   10   10 By Difficulty 100 55      left live voice   10   10 By Difficulty 100 55                    Latest Tympanogram Result       Probe Tone (Hz): 226 Exam performed: 7/31/2024 1:39 PM Last edited by Holli Barker Au.D on 7/31/2024 1:42 PM      Tympanograms  These were drawn by a user, not generated from device data      Right Ear Left Ear                     Right  Ear Left Ear    Tympanogram type: Type A Type A    Canal volume (mL): 1.4 1.2    Peak pressure (daPa): 4 0    Peak amplitude (mL): 0.53 0.65    Tympanogram width (daPa):        Comments:                    Latest Audiogram and Tympanogram Result Text  Last edited by Holli Barker Au.D on 7/31/2024  1:42 PM      Study Result                 Narrative & Impression    Otoscopic inspection: right ear no cerumen; left ear no cerumen.       Tests/Procedures  Patient was tested via  standard insert earphones and a bone oscillator standard audiometry     Audiometric Results (Pure Tone Results)    Audiometric thresholds indicate hearing is within normal limits in both ears     Immitance Test Results    A tympanogram was performed  Tympanometry suggests normal findings bilaterally.    Follow up with Austen Stover M.D..  Audiological monitoring as needed during the course of medical management.                       Nasal endoscopy (date 12/2/2024)  Verbal consent obtained, patient was correctly identified  Topical anesthesia with aerosolized lidocaine and neosynepherine spray in the bilateral nares  Findings:   Right: No mucous throughout. Normal mucosa. Inferior turbinate is hypertrophic. Middle meatus is without polyps, purulence, masses. Middle turbinate is well formed and normal appearing. Sphenoethmoid recess is without polyps, purulence, masses.   Left: No mucous throughout. Normal mucosa. Inferior turbinate is hypertrophic. Middle meatus is without polyps, purulence, masses. Middle turbinate is well formed and normal appearing. Sphenoethmoid recess is without polyps, purulence, masses.   Septum:Rightward nasal septal deviation involving the anterior septum and middle meatus. Does not involve the caudal septum.  Nasopharynx: No masses, bilateral eustachian tubes are patent. The bilateral fossae of Rosenmueller are clear.     ASSESSMENT/PLAN:  Renetta Milian is a 39 year old female with     ICD-10-CM   1.  Other chronic sinusitis  J32.8   2. Vestibular migraine  G43.809   3. Allergic rhinitis, unspecified seasonality, unspecified trigger  J30.9          IMPRESSION:  Normal bilateral eustachian tube function, normal hearing  Possible vestibular migraine - vertigo and ear fullness   No evidence of BPPV today  Allergic rhinitis - Triggering possible asthma?   Hormonal rhinitis related to weaning from breastfeeding?    PLAN:  -Recommend CT Sinus w/o contrast to evaluate for occult sinus disease  -Start Azelastine nasal spray, 2 sprays daily to each nostril, may take up to 6 weeks of consistent use to take effect. Proper application discussed.    -Discontinue Flonase  -Consider montelukast (Singulair)  -Continue with nasal sinus rinses and oral antihistamine   -Discussed most migraine preventative medications are compatible with breastfeeding but would require further risk/benefit discussion. Discussed that hormonal changes can contribute to migraine symptoms.  -Lifestyle changes including stress reduction, migraine diet (caffeine cessation), sleep hygiene, hydration, regular meals discussed  -Patient education: Migraine: More than a Headache  -Symptom diary  -Follow-up after imaging.     Situation reviewed with the patient in detail.    Attention: This note has been scribed by Saloni Yanez under the supervision of Austen Stover MD.     Austen Stover MD  Otology/Otolaryngology  [facility] [address] [place] [address]  Phone [phone]  Fax [phone]      I have personally performed the services described in this documentation. All medical record entries made by the scribe were at my direction and in my presence. I have reviewed the chart and agree that the medical record reflects my personal performance and is accurate and complete.

## 2024-12-23 ENCOUNTER — HOSPITAL ENCOUNTER (OUTPATIENT)
Dept: CT IMAGING | Age: 39
Discharge: HOME | End: 2024-12-23
Attending: OTOLARYNGOLOGY
Payer: COMMERCIAL

## 2024-12-23 DIAGNOSIS — J32.8 OTHER CHRONIC SINUSITIS: ICD-10-CM

## 2024-12-23 PROCEDURE — 70486 CT MAXILLOFACIAL W/O DYE: CPT | Performed by: OTOLARYNGOLOGY

## 2024-12-27 ENCOUNTER — HOSPITAL ENCOUNTER (OUTPATIENT)
Dept: ULTRASOUND IMAGING | Age: 39
Discharge: HOME | End: 2024-12-27
Attending: OBSTETRICS & GYNECOLOGY
Payer: COMMERCIAL

## 2024-12-27 ENCOUNTER — HOSPITAL ENCOUNTER (OUTPATIENT)
Dept: MAMMOGRAPHY | Age: 39
Discharge: HOME | End: 2024-12-27
Attending: OBSTETRICS & GYNECOLOGY
Payer: COMMERCIAL

## 2024-12-27 DIAGNOSIS — R10.2 PELVIC PAIN: ICD-10-CM

## 2024-12-27 DIAGNOSIS — Z12.31 SCREENING MAMMOGRAM, ENCOUNTER FOR: ICD-10-CM

## 2024-12-27 PROCEDURE — 77063 BREAST TOMOSYNTHESIS BI: CPT | Performed by: OBSTETRICS & GYNECOLOGY

## 2024-12-27 PROCEDURE — 76856 US EXAM PELVIC COMPLETE: CPT | Performed by: OBSTETRICS & GYNECOLOGY

## 2024-12-27 PROCEDURE — 76830 TRANSVAGINAL US NON-OB: CPT | Performed by: OBSTETRICS & GYNECOLOGY

## 2024-12-27 PROCEDURE — 77067 SCR MAMMO BI INCL CAD: CPT | Performed by: OBSTETRICS & GYNECOLOGY

## 2024-12-28 ENCOUNTER — APPOINTMENT (OUTPATIENT)
Dept: GENERAL RADIOLOGY | Age: 39
End: 2024-12-28
Attending: EMERGENCY MEDICINE
Payer: COMMERCIAL

## 2024-12-28 ENCOUNTER — HOSPITAL ENCOUNTER (OUTPATIENT)
Age: 39
Discharge: HOME | End: 2024-12-28
Attending: EMERGENCY MEDICINE
Payer: COMMERCIAL

## 2024-12-28 VITALS
HEART RATE: 68 BPM | OXYGEN SATURATION: 100 % | TEMPERATURE: 98 F | DIASTOLIC BLOOD PRESSURE: 68 MMHG | SYSTOLIC BLOOD PRESSURE: 126 MMHG | RESPIRATION RATE: 18 BRPM

## 2024-12-28 DIAGNOSIS — R00.2 PALPITATIONS: Primary | ICD-10-CM

## 2024-12-28 LAB
#MXD IC: 0.6 X10ˆ3/UL (ref 0.1–1)
BUN BLD-MCNC: 12 MG/DL (ref 7–18)
CHLORIDE BLD-SCNC: 105 MMOL/L (ref 98–112)
CO2 BLD-SCNC: 25 MMOL/L (ref 21–32)
CREAT BLD-MCNC: 0.8 MG/DL
DDIMER WHOLE BLOOD: <200 NG/ML DDU (ref ?–400)
EGFRCR SERPLBLD CKD-EPI 2021: 96 ML/MIN/1.73M2 (ref 60–?)
GLUCOSE BLD-MCNC: 94 MG/DL (ref 70–99)
HCT VFR BLD AUTO: 43.3 %
HCT VFR BLD CALC: 46 %
HGB BLD-MCNC: 14.5 G/DL
ISTAT IONIZED CALCIUM FOR CHEM 8: 1.23 MMOL/L (ref 1.12–1.32)
LYMPHOCYTES # BLD AUTO: 2.6 X10ˆ3/UL (ref 1–4)
LYMPHOCYTES NFR BLD AUTO: 30.6 %
MCH RBC QN AUTO: 30.1 PG (ref 26–34)
MCHC RBC AUTO-ENTMCNC: 33.5 G/DL (ref 31–37)
MCV RBC AUTO: 89.8 FL (ref 80–100)
MIXED CELL %: 7.1 %
NEUTROPHILS # BLD AUTO: 5.4 X10ˆ3/UL (ref 1.5–7.7)
NEUTROPHILS NFR BLD AUTO: 62.3 %
PLATELET # BLD AUTO: 314 X10ˆ3/UL (ref 150–450)
POTASSIUM BLD-SCNC: 4.2 MMOL/L (ref 3.6–5.1)
RBC # BLD AUTO: 4.82 X10ˆ6/UL
SODIUM BLD-SCNC: 140 MMOL/L (ref 136–145)
TROPONIN I BLD-MCNC: <0.02 NG/ML
WBC # BLD AUTO: 8.6 X10ˆ3/UL (ref 4–11)

## 2024-12-28 PROCEDURE — 71046 X-RAY EXAM CHEST 2 VIEWS: CPT | Performed by: EMERGENCY MEDICINE

## 2024-12-28 PROCEDURE — 85378 FIBRIN DEGRADE SEMIQUANT: CPT | Performed by: EMERGENCY MEDICINE

## 2024-12-28 PROCEDURE — 36415 COLL VENOUS BLD VENIPUNCTURE: CPT

## 2024-12-28 PROCEDURE — 93005 ELECTROCARDIOGRAM TRACING: CPT

## 2024-12-28 PROCEDURE — 80047 BASIC METABLC PNL IONIZED CA: CPT

## 2024-12-28 PROCEDURE — 99215 OFFICE O/P EST HI 40 MIN: CPT

## 2024-12-28 PROCEDURE — 84484 ASSAY OF TROPONIN QUANT: CPT

## 2024-12-28 PROCEDURE — 93010 ELECTROCARDIOGRAM REPORT: CPT

## 2024-12-28 PROCEDURE — 85025 COMPLETE CBC W/AUTO DIFF WBC: CPT | Performed by: EMERGENCY MEDICINE

## 2024-12-28 NOTE — ED PROVIDER NOTES
Patient Seen in: [facility]      History     Chief Complaint   Patient presents with    Arrythmia/Palpitations     Stated Complaint: Pvc check    Subjective:   HPI      The patient is a 39-year-old female with past history of palpitations, PVCs who presents now with worsening palpitations, pain in the left upper back.  Patient states that she has had a history of palpitations in the past.  The patient had an outpatient Holter monitor ordered by  approximately 1 year ago.  Since approximately Friday of last week, the patient states she has been experiencing worsening palpitations.  Over the last few days, the patient has developed pain in the left scapular area that radiates into the left shoulder/chest area.  Patient has felt somewhat short of breath.  Patient is not on birth control pills.  Patient denies any known family history of blood clots.    Objective:     Past Medical History:    Decorative tattoo    HPV in female    Thyroid cyst    goiter    Varicella    Had chicken pox during childhood              Past Surgical History:   Procedure Laterality Date    Colposcopy, cervix w/upper adjacent vagina; w/biopsy(s), cervix      Leep  2015                No pertinent social history.            Review of Systems    Positive for stated complaint: Pvc check  Other systems are as noted in HPI.  Constitutional and vital signs reviewed.      All other systems reviewed and negative except as noted above.    Physical Exam     ED Triage Vitals [12/28/24 1343]   /68   Pulse 68   Resp 18   Temp 98.1 °F (36.7 °C)   Temp src Temporal   SpO2 100 %   O2 Device None (Room air)       Current Vitals:   Vital Signs  BP: 126/68  Pulse: 68  Resp: 18  Temp: 98.1 °F (36.7 °C)  Temp src: Temporal    Oxygen Therapy  SpO2: 100 %  O2 Device: None (Room air)        Physical Exam    Constitutional: Well-developed well-nourished in no acute distress  Head: Normocephalic, no swelling or tenderness  Eyes: Nonicteric  sclera, no conjunctival injection  ENT: TMs are clear and flat bilaterally.  There is no posterior pharyngeal erythema  Chest: Clear to auscultation, no tenderness  Cardiovascular: Regular rate and rhythm without murmur  Back: There is minimal tenderness overlying the left medial scapular area  Abdomen: Soft, nontender and nondistended  Neurologic: Patient is awake, alert and oriented ×3.  The patient's motor strength is 5 out of 5 and symmetric in the upper and lower extremities bilaterally  Extremities: No focal swelling or tenderness  Skin: No pallor, no redness or warmth to the touch      ED Course     Labs Reviewed   D-DIMER (POC) - Normal   POCT ISTAT CHEM8 CARTRIDGE - Normal   ISTAT TROPONIN - Normal   POCT CBC     EKG    Rate, intervals and axes as noted on EKG Report.  Rate: 67  Rhythm: Sinus Rhythm  Reading: Patient's EKG demonstrates a normal sinus rhythm with a rate of 67.  The patient's axis and intervals are normal.  There is no acute ST elevation         Patient's chest x-ray was independently reviewed by myself.  There is no acute cardiopulmonary process noted    Patient's lab results including normal electrolytes, negative D-dimer, normal troponin, unremarkable x-ray were reviewed with the patient.  Recommend follow-up with the patient's cardiologist for any worsening PVCs.       Pulse ox is 100% on room air, normal.  Vital signs are       MDM      Arrhythmia versus atypical angina versus PE        Medical Decision Making      Disposition and Plan     Clinical Impression:  1. Palpitations         Disposition:  Discharge  12/28/2024  2:19 pm    Follow-up:  Fredy Weldon MD  [address] [place] [address] [address]  [facility] [address] [phone]      Call for an appointment          Medications Prescribed:  Current Discharge Medication List              Supplementary Documentation:

## 2024-12-29 LAB
ATRIAL RATE: 67 BPM
P AXIS: 36 DEGREES
P-R INTERVAL: 138 MS
Q-T INTERVAL: 390 MS
QRS DURATION: 86 MS
QTC CALCULATION (BEZET): 412 MS
R AXIS: 75 DEGREES
T AXIS: 38 DEGREES
VENTRICULAR RATE: 67 BPM

## 2024-12-30 ENCOUNTER — TELEPHONE (OUTPATIENT)
Dept: ALLERGY | Facility: CLINIC | Age: 39
End: 2024-12-30

## 2024-12-30 NOTE — TELEPHONE ENCOUNTER
Patient is currently scheduled at 930 today in a 15-minute follow-up appointment.  Patient last seen by me in November 2021 over 3 years ago.  Please call and reschedule patient to a 30-minute consult slot as patient is not considered an appointment has not been seen over 3 years.  Please reopen the 15-minute follow-up appointment at 930 am

## 2025-01-03 ENCOUNTER — APPOINTMENT (OUTPATIENT)
Dept: PHYSICAL THERAPY | Age: 40
End: 2025-01-03
Attending: OBSTETRICS & GYNECOLOGY
Payer: COMMERCIAL

## 2025-01-08 ENCOUNTER — NURSE ONLY (OUTPATIENT)
Dept: ALLERGY | Facility: CLINIC | Age: 40
End: 2025-01-08

## 2025-01-08 ENCOUNTER — OFFICE VISIT (OUTPATIENT)
Dept: ALLERGY | Facility: CLINIC | Age: 40
End: 2025-01-08
Payer: COMMERCIAL

## 2025-01-08 DIAGNOSIS — R06.02 SOB (SHORTNESS OF BREATH): ICD-10-CM

## 2025-01-08 DIAGNOSIS — J30.89 SEASONAL AND PERENNIAL ALLERGIC RHINOCONJUNCTIVITIS: Primary | ICD-10-CM

## 2025-01-08 DIAGNOSIS — J30.2 SEASONAL AND PERENNIAL ALLERGIC RHINOCONJUNCTIVITIS: Primary | ICD-10-CM

## 2025-01-08 DIAGNOSIS — H10.10 SEASONAL AND PERENNIAL ALLERGIC RHINOCONJUNCTIVITIS: Primary | ICD-10-CM

## 2025-01-08 DIAGNOSIS — Z23 NEED FOR COVID-19 VACCINE: ICD-10-CM

## 2025-01-08 DIAGNOSIS — Z23 FLU VACCINE NEED: ICD-10-CM

## 2025-01-08 PROCEDURE — 95024 IQ TESTS W/ALLERGENIC XTRCS: CPT | Performed by: ALLERGY & IMMUNOLOGY

## 2025-01-08 PROCEDURE — 95004 PERQ TESTS W/ALRGNC XTRCS: CPT | Performed by: ALLERGY & IMMUNOLOGY

## 2025-01-08 PROCEDURE — 99204 OFFICE O/P NEW MOD 45 MIN: CPT | Performed by: ALLERGY & IMMUNOLOGY

## 2025-01-08 RX ORDER — GARLIC EXTRACT 500 MG
1 CAPSULE ORAL DAILY
COMMUNITY

## 2025-01-08 RX ORDER — LEVOCETIRIZINE DIHYDROCHLORIDE 5 MG/1
5 TABLET, FILM COATED ORAL EVERY EVENING
Qty: 90 TABLET | Refills: 1 | Status: SHIPPED | OUTPATIENT
Start: 2025-01-08

## 2025-01-08 NOTE — PATIENT INSTRUCTIONS
#1 allergic rhinitis  See above skin testing to screen for allergic triggers  Reviewed avoidance measures and potential treatment option immunotherapy  Flonase provided some relief but concern for potential PVCs from Flonase per patient report  Trial of Xyzal, levocetirizine 5 mg once a day as an antihistamine  I will of Astelin nasal spray 2 sprays per nostril up to twice a day as an antihistamine nasal spray if refractory to Xyzal  Consider Singulair/montelukast if refractory.  Reviewed FDA warning    #2 shortness of breath.  Reports more of an issue with inability to take a full breath in.  Denies wheezing prior PFT testing was normal.  Prior chest x-ray was normal.  No prior trials of albuterol  Consider empiric trial of albuterol 2 puffs every 4-6 hours to see if it provides symptomatic relief.    #3 flu vaccine recommended and offered    4.  COVID-vaccine booster recommended.  This check with local pharmacy as we do not stock the vaccine in office           Orders This Visit:  Orders Placed This Encounter   Procedures    Noy Dominique This Visit:  Requested Prescriptions     Signed Prescriptions Disp Refills    levocetirizine 5 MG Oral Tab 90 tablet 1     Sig: Take 1 tablet (5 mg total) by mouth every evening.

## 2025-01-08 NOTE — PROGRESS NOTES
Renetta Milian is a 39 year old female.    HPI:     Chief Complaint   Patient presents with    Allergies     Patient reports she has had worsening allergy and breathing symptoms since October. Has done lung function test, ruled out asthma. Has seen ENT for nasal congestion. Was using Zyrtec, Claritin and Flonase without relief. Denies taking any antihistamines in the last 5 days. Was told to come see allergist.         Patient is a 39-year-old female who presents for allergy consultation upon referral of their PCP with a chief complaint of allergies review of records show patient previously seen by me over 3 years ago in November 2021  Prior skin testing at that time was positive to dust mites and trees dog cockroach      Patient with recent pulmonary evaluation with Dr. Vitale PFTs normal  medications listed include Astelin and Pepcid    Prior CT of the sinuses from December 2024 reviewed.  Mild nasal septal deviation    Labs from October 2020 reviewed including CBC CMP.  Absolute eosinophil count was 20    Immunizations reviewed.  COVID-vaccine x 3 doses last in January 2022  Last flu vaccine on record from October 2024 up-to-date    Today patient reports      Allergies   Duration:Oct 2024   Worse since Oct 2024  + ed visit  Timing: comes and goes   Symptoms: hard to get a full breath in  , runny nose, pnd, cough   No wz , sob,   Exercises 4-5x/week. No issues with exercise  Worse with live xmass tree  Severity: moderate   Status: worsening   Triggers: allergies   Tried: astelin , zyrtec,   claritin and no no better . Flonase helped some   No prior alb   Pets : cat   Former smoker     Sees cards at [employer]     10/17/24 : pft normal  Prior ent eval . No prior sx     Hx of asthma, ad, or food allergy:  denies     TTE 1 yr prior for pvc  Prior sleep study nl 10/2024     Impression   CONCLUSION:  1. 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       2.  Mild right-directed nasal septal deviation.       3. Lesser incidental findings as above.           Dictated by (CST): Abdiaziz Stephen MD on 2024 at 5:44 AM      Finalized by (CST): Abdiaziz Stephen MD on 2024 at 5:46 AM         HISTORY:  Past Medical History:    Decorative tattoo    HPV in female    Thyroid cyst    goiter    Varicella    Had chicken pox during childhood      Past Surgical History:   Procedure Laterality Date    Colposcopy, cervix w/upper adjacent vagina; w/biopsy(s), cervix      Leep        Family History   Problem Relation Age of Onset    Other (alzheimers) Mother     Prostate Cancer Father     Colon Cancer Maternal Aunt     Breast Cancer Paternal Aunt 60        unsure of age      Social History:   Social History     Socioeconomic History    Marital status:    Tobacco Use    Smoking status: Former     Current packs/day: 0.00     Average packs/day: 0.5 packs/day for 15.0 years (7.5 ttl pk-yrs)     Types: Cigarettes     Start date: 3/17/2002     Quit date: 3/17/2017     Years since quittin.8     Passive exposure: Past    Smokeless tobacco: Never   Vaping Use    Vaping status: Never Used   Substance and Sexual Activity    Alcohol use: Not Currently     Comment: SOCIALLY    Drug use: No    Sexual activity: Yes     Partners: Male   Other Topics Concern    Caffeine Concern Yes     Comment: Coffee 1 cup daily    Pt has a pacemaker No    Pt has a defibrillator No    Breast feeding No    Reaction to local anesthetic No     Social Drivers of Health     Financial Resource Strain: Low Risk  (2023)    Financial Resource Strain     Difficulty of Paying Living Expenses: Not hard at all     Med Affordability: No   Food Insecurity: No Food Insecurity (2023)    Food Insecurity     Food Insecurity: Never true   Transportation Needs: No Transportation Needs (2023)    Transportation Needs     Lack of Transportation: No   Stress: No Stress Concern Present (2023)    Stress      Feeling of Stress : No   Housing Stability: Low Risk  (5/28/2023)    Housing Stability     Housing Instability: No        Medications (Active prior to today's visit):  Current Outpatient Medications   Medication Sig Dispense Refill    acidophilus-pectin Oral Cap Take 1 capsule by mouth daily.      levocetirizine 5 MG Oral Tab Take 1 tablet (5 mg total) by mouth every evening. 90 tablet 1    Multiple Vitamins-Minerals (WOMENS MULTIVITAMIN) Oral Tab       Cholecalciferol (VITAMIN D) 1000 units Oral Tab Take 1,000 Units by mouth daily.        azelastine 0.1 % Nasal Solution 2 sprays by Nasal route 2 (two) times daily. (Patient not taking: Reported on 1/8/2025) 30 mL 1    polyethylene glycol, PEG 3350-KCl-NaBcb-NaCl-NaSulf, 236 g Oral Recon Soln Take 4,000 mL by mouth As Directed. Take 2,000 mL the night before your procedure and 2,000 mL the morning of your procedure. (Patient not taking: Reported on 1/8/2025) 1 each 0       Allergies:  Allergies[1]      ROS:     Allergic/Immuno:  See HPI  Cardiovascular:  Negative for irregular heartbeat/palpitations, chest pain, edema  Constitutional:  Negative night sweats,weight loss, irritability and lethargy  Endocrine:  Negative for cold intolerance, polydipsia and polyphagia  ENMT:  Negative for ear drainage, hearing loss see hpi   Eyes:  Negative for eye discharge and vision loss  Gastrointestinal:  Negative for abdominal pain, diarrhea and vomiting  Genitourinary:  Negative for dysuria and hematuria  Hema/Lymph:  Negative for easy bleeding and easy bruising  Integumentary:  Negative for pruritus and rash  Musculoskeletal:  Negative for joint symptoms  Neurological:  Negative for dizziness, seizures  Psychiatric:  Negative for inappropriate interaction and psychiatric symptoms  Respiratory:  see hpi     PHYSICAL EXAM:   Constitutional: responsive, no acute distress noted  Head/Face: NC/Atraumatic  Eyes/Vision: conjunctiva and lids are normal extraocular motion is intact    Ears/Audiometry: tympanic membranes are normal bilaterally hearing is grossly intact  Nose/Mouth/Throat: nose and throat are clear mucous membranes are moist   Neck/Thyroid: neck is supple without adenopathy  Lymphatic: no abnormal cervical, supraclavicular or axillary adenopathy is noted  Respiratory: normal to inspection lungs are clear to auscultation bilaterally normal respiratory effort   Cardiovascular: regular rate and rhythm no murmurs, gallups, or rubs  Abdomen: soft non-tender non-distended  Skin/Hair: no unusual rashes present  Extremities: no edema, cyanosis, or clubbing  Neurological:Oriented to time, place, person & situation       ASSESSMENT/PLAN:   Assessment   Encounter Diagnoses   Name Primary?    Seasonal and perennial allergic rhinoconjunctivitis Yes    Flu vaccine need     Need for COVID-19 vaccine     SOB (shortness of breath)      Skin testing today to common indoor and outdoor environmental allergies was + to weeds  and dm     Positive histamine control    #1 allergic rhinitis  See above skin testing to screen for allergic triggers  Reviewed avoidance measures and potential treatment option immunotherapy  Flonase provided some relief but concern for potential PVCs from Flonase per patient report  Trial of Xyzal, levocetirizine 5 mg once a day as an antihistamine  I will of Astelin nasal spray 2 sprays per nostril up to twice a day as an antihistamine nasal spray if refractory to Xyzal  Consider Singulair/montelukast if refractory.  Reviewed FDA warning    #2 shortness of breath.  Reports more of an issue with inability to take a full breath in.  Denies wheezing prior PFT testing was normal.  Prior chest x-ray was normal.  No prior trials of albuterol  Consider empiric trial of albuterol 2 puffs every 4-6 hours to see if it provides symptomatic relief.    #3 flu vaccine recommended and offered    4.  COVID-vaccine booster recommended.  This check with local pharmacy as we do not stock the  vaccine in office           Orders This Visit:  Orders Placed This Encounter   Procedures    Noy Dominique This Visit:  Requested Prescriptions     Signed Prescriptions Disp Refills    levocetirizine 5 MG Oral Tab 90 tablet 1     Sig: Take 1 tablet (5 mg total) by mouth every evening.       Imaging & Referrals:  None     1/8/2025  Juan Chi MD      If medication samples were provided today, they were provided solely for patient education and training related to self administration of these medications.  Teaching, instruction and sample was provided to the patient by myself.  Teaching included  a review of potential adverse side effects as well as potential efficacy.  Patient's questions were answered in regards to medication administration and dosing and potential side effects. Teaching was provided via the teach back method         [1] No Known Allergies

## 2025-01-09 ENCOUNTER — TELEPHONE (OUTPATIENT)
Dept: PHYSICAL THERAPY | Facility: HOSPITAL | Age: 40
End: 2025-01-09

## 2025-01-10 ENCOUNTER — OFFICE VISIT (OUTPATIENT)
Dept: PHYSICAL THERAPY | Age: 40
End: 2025-01-10
Attending: OBSTETRICS & GYNECOLOGY
Payer: COMMERCIAL

## 2025-01-10 DIAGNOSIS — N39.3 SUI (STRESS URINARY INCONTINENCE, FEMALE): Primary | ICD-10-CM

## 2025-01-10 PROCEDURE — 97530 THERAPEUTIC ACTIVITIES: CPT

## 2025-01-10 PROCEDURE — 97161 PT EVAL LOW COMPLEX 20 MIN: CPT

## 2025-01-10 NOTE — PROGRESS NOTES
MUSCULOSKELETAL AND PELVIC FLOOR EVALUATION:     Diagnosis:   HSO (stress urinary incontinence, female) (N39.3) Patient:  Renetta Milian (39 year old, female)        Referring Provider: Ryan Dangelo  Today's Date   1/10/2025    Precautions:  None   Date of Evaluation: 01/10/25  Next MD visit: none scheduled  Date of Surgery: n/a     PATIENT SUMMARY   Summary of chief complaints: urge urinary incontinence, incomplete bladder and bowel emptying, coccyx pain  History of current condition: Pt reports that she has had 3 children and has felt very weak in her pelvic and abdominal area.  She works out 5 days a week but feels week in this area.  She reports urinary leakage 1-2x/month due to strong urge. She also states she feels she does not completely empty her bladder or bowel 75% of the time.  She has started doing a subscription exercise program for moms last week but has had some soreness in her lower abdomen since starting.  States that she has also had intermittent tailbone pain since summer of 2024, which has been worse with the new exercises.  Also has noticed it with prolonged sitting or during bowel movements.   Pain level: current 1 /10 (tail bone), at best 0 /10, at worst 4 /10 (tail bone)  Description of symptoms: occasional urge urinary incontinence; lower abdominal pressure; coccyx pain   Occupation: Works full time at home; usually sits on her couch with laptop   Leisure activities/Hobbies: Walking, Exercise classes, Yoga   Prior level of function: Decreased strength since last pregnancy 1 1/2 year ago  Current limitations: Prolonged sitting, some of her current exercises  Pt goals: Improve her strength, decrease urinary leakage  Pregnant Now: No   OB History    Para Term  AB Living   4 3 3 0 1 3   SAB IAB Ectopic Multiple Live Births   0 0 0 0 3      # Outcome Date GA Lbr Gulshan/2nd Weight Sex Type Anes PTL Lv   4 Term 23 38w6d 02:57 / 00:17 7 lb 11 oz F NORMAL SPONT Local N FLOYD       Complications: Group B beta strep +, Variable decelerations, Meconium      Name: SANDIE DE LA VEGA      Apgar1: 8  Apgar5: 9   3 Term 20 39w4d 08:38 / 00:03 7 lb 6.5 oz F NORMAL SPONT Local N FLOYD      Birth Comments: Time of Delivery: 104  Mother's age: 34 years  Maternal Blood Type: O  Maternal RH Factor: Positive  : 3  Para: 2  Passed hearing test: Passed  Feeding: Breast  Bilirubin: 7.0      Name: SANDIE DE LA VEGA      Apgar1: 9  Apgar5: 9   2 Term 10/28/17 40w2d 03:50 / 05:34 7 lb 12.5 oz F NORMAL SPONT EPI N FLOYD      Birth Comments: Time of Delivery: 9:54 AM  Mother's age: 31 years  Maternal Blood Type: O Positive   - 2    Para- 1011   Passed hearing test   TCB:  3.70    10/29/2017    09:58   Hep B: 10/28/2017      Complications: Group B beta strep +      Name: SANDIE DE LA VEGA      Apgar1: 9  Apgar5: 9   1 AB  5w0d   U         Urodynamic Test: no   Manometry: n/a   PFDI 20    Scores   POPDI 6:    50   CRAD 8:    34.38   GLENNY 6:    41.67   Summary:    126.05      Outpatient Therapy Pelvic Health Intake       Question 2025  1:26 PM CST - Filed by Patient    Do you usually experience pressure in the lower abdomen? Moderately    Do you usually experience heaviness or dullness in the pelvic area? Moderately    Do you usually have a bulge or something falling out that you can see or feel in your vaginal area? Not at all    Do you ever have to push on the vagina or around the rectum to have or complete a bowel movement? Not at all    Do you usually experience a feeling of incomplete bladder emptying? Moderately    Do you ever have to push up on a bulge in the vaginal area with your fingers to start or complete urination? Not at all    Please provide details on the following urinary history / complaints     Frequency of urination: # of times/day 10    Frequency of urination: # of times/night 2    When you have the urge to urinate, how long can you delay before you have to go to the toilet? (#  of minutes or hours) 30 minutes    Do you have a history of urinary tract infections: No    Do you have a history of urine loss (select all that apply)       How many times a day does leakage occur? 0    If you have leakage, what type(s) of protective device(s) do you use? (Select all that apply) None    On average, how many pads do you use each day? 0    Do you soak the pad fully? No    Do you change the pad each time it is wet? No    Do you experience any of these symptoms? (Select all that apply) Have to strain to empty your bladder     Dribble urine when you are urinating     Dribble after you empty your bladder    Do you usually experience frequent urination? Moderately    Do you usually experience urine leakage associated with a feeling of urgency, that is, a strong sensation of needing to go to the bathroom? Somewhat    Do you usually experience urine leakage related to coughing, sneezing, or laughing? Not at all    Do you usually experience small amounts of urine leakage (that is, drops)? Not at all    Do you usually experience difficulty empyting your bladder? Somewhat    Do you usually experience pain or discomfort in the lower abdomen or genital region? Not at all    Have you ever had any of the following treatment:     Have you ever done exercises to control urine loss? If so, for how long? 0    Has your doctor ever prescribed any medication for urine loss? No    Have you had any surgical procedures to treat urine loss? No    Please provide details on the following bowel history / complaints.     How many bowel movements do you have per day? 1    How many bowel movements do you have per night? 0    Do you experience diarrhea? If yes, how often? No    Do you feel you need to strain too hard to have a bowel movement? Not at all    Do you feel you have not completely emptied your bowels at the end of a bowel movement? Moderately    Do you usually lose stool beyond your control if your stool is well formed?  Not at all    Do you usually lose stool beyond your control if your stool is loose? Not at all    Do you usually lose gas from the rectum beyond your control? Somewhat    Do you usually have pain when you pass your stool? Not at all    Do you experience strong sense of urgency and have to rush to bathroom for bowel movement? Not at all    Does part of bowel ever pass through rectum and bulge outside during/after bowel movement? Not at all    Please provide details on your daily fluid/food intake.     On average, what is your daily fluid intake (1 glass=8ounces)? (# of glasses per day) 136    How many are caffeinated? (# of glasses per day) 8    Do you restrict fluids because of incontenence (leakage)? No    Do you include fiber in your diet (fruits, vegetables, bran, etc.)? Yes    Psychosocial information     Do you live alone? No    Which recreational activities do you participate in if any? Exercise    Have you had to restrict your activities due to your pelvic health concerns? No    On a scale of 0 (no impairments) to 10 (severe impairments), what are your feelings about your urinary or bowel incontinence or pelvic pain? 4    Do you have pain with intercourse? No    Have you had any changes in intimate relationships/sexual function due to your symptoms?  No    Your personal safety is of utmost importance to us at [facility], please answer the following questions:     Are you being hurt, frightened, demeaned, or taken advantage of by anyone at your home or in your life?  No    Have you recently had thoughts of hurting yourself? No    Have you tried to hurt yourself in the past?  No    Pelvic Organ Prolapse Distress Inventory 6 Score (range: 0 - 100) 50    Colorectal-Anal Distress Inventory 8 Score (range: 0 - 100) 34.38    Urinary Distress Inventory 6 Score (range: 0 - 100) 41.67    PFDI Summary Score (range: 0 - 300) 126.05            Past medical history was reviewed with Favian Reyna  findings include: None  Imaging/Tests: n/a   Renetta  has a past medical history of Decorative tattoo, HPV in female, Thyroid cyst, and Varicella.  She  has a past surgical history that includes leep (2015) and colposcopy, cervix w/upper adjacent vagina; w/biopsy(s), cervix.    ASSESSMENT  Renetta presents to physical therapy evaluation with primary c/o urge urinary incontinence, incomplete bladder and bowel emptying, coccyx pain. The results of the objective tests and measures show tenderness to palpation of coccyx, decreased lumbar AROM; high impairment based on self-reported PFDI-20 score.  Internal pelvic floor assessment to be completed at next visit due to time for extensive history and pt education. Functional deficits include but are not limited to Prolonged sitting, some of her current exercises. Signs and symptoms are consistent with diagnosis of urge urinary incontinence and pelvic pain. Pt and PT discussed evaluation findings, pathology, POC and HEP.  Pt voiced understanding and performs HEP correctly without reported pain. Skilled Physical Therapy is medically necessary to address the above impairments and reach functional goals.    OBJECTIVE:   Musculoskeletal  Posture: Posture: Sits with right leg crossed over left   Pelvic Alignment: WNL   External Palpation: Tenderness at coccyx, L5 and S1 spinous processes.  No tenderness at PSIS, hip musculature.  Adhesions at bilat quadratus lumborum   Scars (location/surgery): n/a   Diastasis Recti (finger width depth while contracted):    Above Umbilicus: WNL    Umbilicus: WNL    Below Umbilicus: WNL   Trunk       1/10/2025   Trunk ROM/MMT   Trunk Flex WNL   Trunk Extension WNL   Trunk Rt Side Bend mild restrictions       c/o tightness at left low back   Trunk Lt Side Bend mild restriction       c/o tightness at right low back   Trunk Rt Rotation mild restriction   Trunk Lt Rotation mild restriction   , Hip       1/10/2025   Hip ROM/MMT   Rt Hip Flexion MMT 5   Lt  Hip Flexion MMT 5   Rt Hip Extension MMT 5   Lt Hip Extension MMT 5   Rt Hip Abduction MMT 5   Lt Hip Abduction MMT 5   Rt Hip ER MMT 5   Lt Hip ER MMT 5   Rt Hip IR MMT 5   Lt Hip IR MMT 5     LE Flexibility:  Hamstring WNL bilat  Piriformis WNL bilat    Informed consent for internal pelvic evaluation given:Yes  to be completed at next visit       Today's Treatment and Response:   Pt education was provided on exam findings, treatment diagnosis, treatment plan, expectations, and prognosis.  Today's Treatment       1/10/2025   Treatment   Therapeutic Activity -Pt education regarding pelvic floor anatomy with use of pelvic model.  -Pt education regarding purpose of internal pelvic floor assessment and expectations for 2nd visit.  -Pt education regarding bladder irritants and bladder normatives.  -Pt education regarding urge control techniques. (Handout given to pt)  -Pt education regarding inverse relationship of diaphragm and pelvic floor muscles and instructions for diaphragmatic breathing.   Therapeutic Activity Min 20   Total of Timed Procedures 20   Total of Service Based 25   Total Treatment Time 45          No data to display                 Patient was instructed in and issued a HEP for: Diaphragmatic Breathing    Charges:  PT EVAL: Low Complexity, 1 Ther Act  In agreement with evaluation findings and clinical rationale, this evaluation involved LOW COMPLEXITY decision making due to no personal factors/comorbidities, 1-2 body structures involved/activity limitations, and stable symptoms as documented in the evaluation.                                                                       PLAN OF CARE:    Goals: (to be met in 10 visits)   Goals       Therapy Goals      Not Met Progress Toward Partially Met Met   Patient will demonstrate PFM lengthening WNL with coordinated diaphragmatic breathing x10 reps to alleviate PFM pain  [x] [] [] []   Patient will report decreased urinary frequency to <8x/day and  0x/night indicating normalizing micturition reflex for improved bladder health and function. [x] [] [] []   Patient to make use of urinary urge control techniques to eliminate urinary leakage to 0/month [x] [] [] []   Patient will report adherence to HEP for continued exercise benefits following cessation of PT. [x] [] [] []                   Frequency / Duration: Patient will be seen 1x/week or a total of 10  visits over a 90 day period. Treatment will include: Manual Therapy; Neuromuscular Re-education; Therapeutic Activities; Therapeutic Exercise; Home Exercise Program instruction    Education or treatment limitation: None   Rehab Potential: good         Patient/Family/Caregiver was advised of these findings, precautions, and treatment options and has agreed to actively participate in planning and for this course of care.    Thank you for your referral. Please co-sign or sign and return this letter via fax as soon as possible to [phone]. If you have any questions, please contact me at Dept: [phone]    Sincerely,  Electronically signed by therapist: Maritza Bernabe PT  Physician's certification required: Yes  I certify the need for these services furnished under this plan of treatment and while under my care.    X___________________________________________________ Date____________________    Certification From: 1/10/2025  To:4/10/2025

## 2025-01-17 ENCOUNTER — OFFICE VISIT (OUTPATIENT)
Dept: PHYSICAL THERAPY | Age: 40
End: 2025-01-17
Attending: OBSTETRICS & GYNECOLOGY
Payer: COMMERCIAL

## 2025-01-17 PROCEDURE — 97112 NEUROMUSCULAR REEDUCATION: CPT

## 2025-01-17 PROCEDURE — 97530 THERAPEUTIC ACTIVITIES: CPT

## 2025-01-17 NOTE — PROGRESS NOTES
Patient: Renetta Milian (39 year old, female) Referring Provider:  Insurance:   Diagnosis: SHO (stress urinary incontinence, female) (N39.3) Ryan Dangelo  [employer] [place]   Date of Surgery: n/a Next MD visit:  N/A   Precautions:  None none scheduled Referral Information:    Date of Evaluation: Req: 0, Auth: 0, Exp:     01/10/25 POC Auth Visits:  10       Today's Date   1/17/2025    Subjective  Pt states that she has been doing some of the deep breathing when going to the bathroom and it has helped.  Had a little bit of tailbone soreness after a 45 minute walk.       Pain: 0/10     Objective  Informed consent for internal pelvic evaluation given: yes     External Observation:   Voluntary contraction: yes    Voluntary relaxation: yes  Involuntary contraction:  yes   Involuntary relaxation: yes   Mons pubis:  WNL   Labia majora:  WNL  Labia minora:  WNL   Urethral meatus:  WNL   Introitus:  WNL   Perineal body:  WNL    Internal Examination   Scar:  5:00, no tenderness    Pelvic Floor Muscle strength: (PERF= Power/Endurance/Reps/Fast) MMT:  Power Endurance REPS Fast    2+  3  3  NT       Accessory Muscle Use:  abdominals, adductors     Tissue Laxity Test:  Anterior Wall:  WNL  Posterior Wall:   WNL  Apical:   WNL       Internal Palpation:  WNL except  Superficial Transverse Perineal      Bulbocavernosus      Ischiocavernosus     Deep Transverse Perineal  Mild tenderness   Compress Urethra/Sphincter  Not tested   Urethrovaginalis  Not tested   Pubococcygeus/Pubovaginalis     Iliococcygeus  Right mild tenderness   Coccygeus  Not tested   Piriformis (palpated externally)   Not tested    Obturator Internus   Mild tenderness and tightness bilat   Pelvic Clock  Not tested         Assessment  Internal pelvic floor muscle assessment performed today and pt only mild increased tenderness at right levator ani and mild tenderness and tightness at obturator internus.  Also fair PFM strength with compensation of abdominals so pt  will benefit from PFM facilitation and relaxation with diaphragmatic breathing activities.    Goals (to be met in 10 visits)   Goals       Therapy Goals      Not Met Progress Toward Partially Met Met   Patient will demonstrate PFM lengthening WNL with coordinated diaphragmatic breathing x10 reps to alleviate PFM pain  [x] [] [] []   Patient will report decreased urinary frequency to <8x/day and 0x/night indicating normalizing micturition reflex for improved bladder health and function. [x] [] [] []   Patient to make use of urinary urge control techniques to eliminate urinary leakage to 0/month [x] [] [] []   Patient will report adherence to HEP for continued exercise benefits following cessation of PT. [x] [] [] []                  Plan  Continue work on core stabilization with diaphragmatic breathing for PFM facilitation and relaxation.    Treatment Last 4 Visits       1/10/2025 1/17/2025   Treatment   Treatment Day  2   Neuro Re-Ed  -Reviewed diaphragmatic breathing in supine  -Practice deep breathing with TA, multifidi and PFM contractions on exhale  -Bent knee fall out with inhale/exhale 10x R/L  -Bridges with segmental lowering and inhale/exhale 5x, with yoga block 5x  -Quadruped opposite UE/LE with inhale/exhale 5x each side  -Quadruped hip abd/ER with inhale/exhale 10x R/L  -Supine march with inhale/exhale 10x   Therapeutic Activity -Pt education regarding pelvic floor anatomy with use of pelvic model.  -Pt education regarding purpose of internal pelvic floor assessment and expectations for 2nd visit.  -Pt education regarding bladder irritants and bladder normatives.  -Pt education regarding urge control techniques. (Handout given to pt)  -Pt education regarding inverse relationship of diaphragm and pelvic floor muscles and instructions for diaphragmatic breathing. -Internal pelvic floor assessment completed and discussed findings and POC  -Pt education regarding core 4 muscles   Neuro Re-Ed Min  20    Therapeutic Activity Min 20 23   Total of Timed Procedures 20 43   Total of Service Based 25 0   Total Treatment Time 45 43         HEP  1/10/25: Diaphragmatic Breathing    Charges     2 Ther Act, 1 NMR

## 2025-01-24 ENCOUNTER — LAB ENCOUNTER (OUTPATIENT)
Dept: LAB | Age: 40
End: 2025-01-24
Attending: ALLERGY & IMMUNOLOGY
Payer: COMMERCIAL

## 2025-01-24 ENCOUNTER — OFFICE VISIT (OUTPATIENT)
Dept: PHYSICAL THERAPY | Age: 40
End: 2025-01-24
Attending: OBSTETRICS & GYNECOLOGY
Payer: COMMERCIAL

## 2025-01-24 DIAGNOSIS — J30.89 SEASONAL AND PERENNIAL ALLERGIC RHINOCONJUNCTIVITIS: ICD-10-CM

## 2025-01-24 DIAGNOSIS — H10.10 SEASONAL AND PERENNIAL ALLERGIC RHINOCONJUNCTIVITIS: ICD-10-CM

## 2025-01-24 DIAGNOSIS — J30.2 SEASONAL AND PERENNIAL ALLERGIC RHINOCONJUNCTIVITIS: ICD-10-CM

## 2025-01-24 PROCEDURE — 86003 ALLG SPEC IGE CRUDE XTRC EA: CPT

## 2025-01-24 PROCEDURE — 97112 NEUROMUSCULAR REEDUCATION: CPT

## 2025-01-24 PROCEDURE — 36415 COLL VENOUS BLD VENIPUNCTURE: CPT

## 2025-01-24 NOTE — PROGRESS NOTES
Patient: Renetta Milian (39 year old, female) Referring Provider:  Insurance:   Diagnosis: SHO (stress urinary incontinence, female) (N39.3) Ryan Dangelo  [employer] [place]   Date of Surgery: n/a Next MD visit:  N/A   Precautions:  None none scheduled Referral Information:    Date of Evaluation: Req: 0, Auth: 0, Exp:     01/10/25 POC Auth Visits:  10       Today's Date   1/24/2025    Subjective  Pt states that she is being more conscious of her core muscles while doing her work outs.  She has not been having pain.  She did notice more instances of urinary urge but did not have any leakage.       Pain: 0/10     Objective  See daily treatment log              Assessment  Pt to pay attention to foods and drinks that she has this week which may be bladder irritants and causing more urge. Pt is doing very well with deep breathing with core stability activities.    Goals (to be met in 10 visits)   Goals       Therapy Goals      Not Met Progress Toward Partially Met Met   Patient will demonstrate PFM lengthening WNL with coordinated diaphragmatic breathing x10 reps to alleviate PFM pain  [x] [] [] []   Patient will report decreased urinary frequency to <8x/day and 0x/night indicating normalizing micturition reflex for improved bladder health and function. [x] [] [] []   Patient to make use of urinary urge control techniques to eliminate urinary leakage to 0/month [x] [] [] []   Patient will report adherence to HEP for continued exercise benefits following cessation of PT. [x] [] [] []                  Plan  Continue work on core stabilization with diaphragmatic breathing for PFM facilitation and relaxation.    Treatment Last 4 Visits       1/10/2025 1/17/2025 1/24/2025   Treatment   Treatment Day  2 3   Neuro Re-Ed  -Reviewed diaphragmatic breathing in supine  -Practice deep breathing with TA, multifidi and PFM contractions on exhale  -Bent knee fall out with inhale/exhale 10x R/L  -Bridges with segmental lowering and  inhale/exhale 5x, with yoga block 5x  -Quadruped opposite UE/LE with inhale/exhale 5x each side  -Quadruped hip abd/ER with inhale/exhale 10x R/L  -Supine march with inhale/exhale 10x -Practice of deep breathing with TA, multifidi and PFM contractions on exhale  -Bent knee fall out with inhale/exhale 10x R/L  -Bridges with ball squeeze and segmental lowering and inhale/exhale  10x  -Side lying clams with inhale/exhale 10x R/L  -Side lying reverse clams with inhale/exhale 10x R/L  -Quadruped opposite UE/LE with inhale/exhale 5x each side  -Child's pose stretch with deep breathing   -Quadruped hip abd/ER with inhale/exhale 10x R/L  -Shuttle Double leg 6 cords with inhale/exhale 15x  -Shuttle Single leg press 3 cords with inhale/exhale 10x R/L  -Mini squats with inhale/exhale 10x  -Mini squats with 8# with inhale/exhale 10x  -Resisted rows green with exhale 15x  -Resisted single arm row with opposite single leg stance green 10x R/L         Therapeutic Activity -Pt education regarding pelvic floor anatomy with use of pelvic model.  -Pt education regarding purpose of internal pelvic floor assessment and expectations for 2nd visit.  -Pt education regarding bladder irritants and bladder normatives.  -Pt education regarding urge control techniques. (Handout given to pt)  -Pt education regarding inverse relationship of diaphragm and pelvic floor muscles and instructions for diaphragmatic breathing. -Internal pelvic floor assessment completed and discussed findings and POC  -Pt education regarding core 4 muscles    Neuro Re-Ed Min  20 45   Therapeutic Activity Min 20 23    Total of Timed Procedures 20 43 45   Total of Service Based 25 0 0   Total Treatment Time 45 43 45         HEP  1/10/25: Diaphragmatic Breathing    Charges     3 Neuro re-ed

## 2025-01-29 LAB
Lab: <0.1 KU/L
Lab: <0.1 KU/L

## 2025-01-30 ENCOUNTER — TELEPHONE (OUTPATIENT)
Dept: ALLERGY | Facility: CLINIC | Age: 40
End: 2025-01-30

## 2025-01-30 NOTE — TELEPHONE ENCOUNTER
RN called pt to go over results listed below.  Pt verified name and .  RN went over results, she denies any further questions or concerns.        ----- Message from Juan Chi sent at 2025 11:58 AM CST -----  Please contact patient with negative serum IgE testing to [place]

## 2025-01-31 ENCOUNTER — OFFICE VISIT (OUTPATIENT)
Dept: PHYSICAL THERAPY | Age: 40
End: 2025-01-31
Attending: OBSTETRICS & GYNECOLOGY
Payer: COMMERCIAL

## 2025-01-31 PROCEDURE — 97112 NEUROMUSCULAR REEDUCATION: CPT

## 2025-01-31 NOTE — PROGRESS NOTES
Patient: Renetta Milian (39 year old, female) Referring Provider:  Insurance:   Diagnosis: SHO (stress urinary incontinence, female) (N39.3) Ryan Dangelo  [employer] [place]   Date of Surgery: n/a Next MD visit:  N/A   Precautions:  None none scheduled Referral Information:    Date of Evaluation: Req: 0, Auth: 0, Exp:     01/10/25 POC Auth Visits:  10       Today's Date   1/31/2025    Subjective  Pt states that Saturday she woke up with tail bone pain.  In the middle of the week, she noticed it again.  Otherwise, she is feeling stronger in her core.  She had 2 episodes of a strong urge again but did not have leakage.       Pain: 0/10     Objective  See daily treatment log              Assessment  Held squats today due to pt thinking that this may have caused her tail bone soreness.  Pt is doing well with all other core stabilization with diaphragmatic breathing activities.  Pt to log urination this week to track frequency due to her continuing to have urgency.    Goals (to be met in 10 visits)   Goals       Therapy Goals      Not Met Progress Toward Partially Met Met   Patient will demonstrate PFM lengthening WNL with coordinated diaphragmatic breathing x10 reps to alleviate PFM pain  [x] [] [] []   Patient will report decreased urinary frequency to <8x/day and 0x/night indicating normalizing micturition reflex for improved bladder health and function. [x] [] [] []   Patient to make use of urinary urge control techniques to eliminate urinary leakage to 0/month [x] [] [] []   Patient will report adherence to HEP for continued exercise benefits following cessation of PT. [x] [] [] []                  Plan  Continue work on core stabilization with diaphragmatic breathing for PFM facilitation and relaxation.    Treatment Last 4 Visits       1/10/2025 1/17/2025 1/24/2025 1/31/2025   Treatment   Treatment Day  2 3 4   Neuro Re-Ed  -Reviewed diaphragmatic breathing in supine  -Practice deep breathing with TA, multifidi and  PFM contractions on exhale  -Bent knee fall out with inhale/exhale 10x R/L  -Bridges with segmental lowering and inhale/exhale 5x, with yoga block 5x  -Quadruped opposite UE/LE with inhale/exhale 5x each side  -Quadruped hip abd/ER with inhale/exhale 10x R/L  -Supine march with inhale/exhale 10x -Practice of deep breathing with TA, multifidi and PFM contractions on exhale  -Bent knee fall out with inhale/exhale 10x R/L  -Bridges with ball squeeze and segmental lowering and inhale/exhale  10x  -Side lying clams with inhale/exhale 10x R/L  -Side lying reverse clams with inhale/exhale 10x R/L  -Quadruped opposite UE/LE with inhale/exhale 5x each side  -Child's pose stretch with deep breathing   -Quadruped hip abd/ER with inhale/exhale 10x R/L  -Shuttle Double leg 6 cords with inhale/exhale 15x  -Shuttle Single leg press 3 cords with inhale/exhale 10x R/L  -Mini squats with inhale/exhale 10x  -Mini squats with 8# with inhale/exhale 10x  -Resisted rows green with exhale 15x  -Resisted single arm row with opposite single leg stance green 10x R/L       -Practice of deep breathing with TA, multifidi and PFM contractions on exhale  -Bent knee fall out with inhale/exhale 10x R/L  -Bridges with ball squeeze and segmental lowering and inhale/exhale  10x  -Side lying clams with inhale/exhale 10x R/L  -Side lying reverse clams with inhale/exhale 10x R/L  -Quadruped cat/cow with deep breathing 10x  -Quadruped opposite UE/LE with inhale/exhale 5x each side  -Child's pose stretch with deep breathing 1 min   -Quadruped hip abd/ER with inhale/exhale 10x R/L  -Mini ant lunge with exhale 10x R/L  -Resisted rows green with exhale 15x  -Resisted chest press with exhale Green 15x  -Resisted bilat shoulder pull downs with exhale Red 15x  -Resisted single arm row with opposite single leg stance green 10x R/L    Therapeutic Activity -Pt education regarding pelvic floor anatomy with use of pelvic model.  -Pt education regarding purpose of  internal pelvic floor assessment and expectations for 2nd visit.  -Pt education regarding bladder irritants and bladder normatives.  -Pt education regarding urge control techniques. (Handout given to pt)  -Pt education regarding inverse relationship of diaphragm and pelvic floor muscles and instructions for diaphragmatic breathing. -Internal pelvic floor assessment completed and discussed findings and POC  -Pt education regarding core 4 muscles     Neuro Re-Ed Min  20 45 43   Therapeutic Activity Min 20 23     Total of Timed Procedures 20 43 45 43   Total of Service Based 25 0 0 0   Total Treatment Time 45 43 45 43         HEP  1/10/25: Diaphragmatic Breathing    Charges     3 Neuro re-ed

## 2025-02-07 ENCOUNTER — APPOINTMENT (OUTPATIENT)
Dept: PHYSICAL THERAPY | Age: 40
End: 2025-02-07
Attending: OBSTETRICS & GYNECOLOGY
Payer: COMMERCIAL

## 2025-02-14 ENCOUNTER — OFFICE VISIT (OUTPATIENT)
Dept: PHYSICAL THERAPY | Age: 40
End: 2025-02-14
Attending: OBSTETRICS & GYNECOLOGY
Payer: COMMERCIAL

## 2025-02-14 PROCEDURE — 97112 NEUROMUSCULAR REEDUCATION: CPT

## 2025-02-14 PROCEDURE — 97140 MANUAL THERAPY 1/> REGIONS: CPT

## 2025-02-14 NOTE — PROGRESS NOTES
Patient: Renetta Milian (39 year old, female) Referring Provider:  Insurance:   Diagnosis: SHO (stress urinary incontinence, female) (N39.3) Ryan Dangelo  [employer] [place]   Date of Surgery: n/a Next MD visit:  N/A   Precautions:  None none scheduled Referral Information:    Date of Evaluation: Req: 0, Auth: 0, Exp:     01/10/25 POC Auth Visits:  10       Today's Date   2/14/2025    Subjective  Pt reports that she was sick last week so did not work out as much.  She remembers that after last visit, she had some tailbone soreness.  She is having tail bone pain right now.  Still has had some strong urgency but has not had leakage.       Pain: 3/10     Objective  See daily treatment log             Assessment  Held bridges and quadruped hip abd/ER due to increased coccyx pain after PT and after doing her HEP one day.  Pt will record her bladder diary to determine urinary frequency.    Goals (to be met in 10 visits)   Goals       Therapy Goals      Not Met Progress Toward Partially Met Met   Patient will demonstrate PFM lengthening WNL with coordinated diaphragmatic breathing x10 reps to alleviate PFM pain  [x] [] [] []   Patient will report decreased urinary frequency to <8x/day and 0x/night indicating normalizing micturition reflex for improved bladder health and function. [x] [] [] []   Patient to make use of urinary urge control techniques to eliminate urinary leakage to 0/month [x] [] [] []   Patient will report adherence to HEP for continued exercise benefits following cessation of PT. [x] [] [] []                  Plan  Pt to complete bladder diary this week and review at next visit.    Treatment Last 4 Visits       1/17/2025 1/24/2025 1/31/2025 2/14/2025   PT Treatment   Treatment Day 2 3 4 5   Neuro Re-Ed -Reviewed diaphragmatic breathing in supine  -Practice deep breathing with TA, multifidi and PFM contractions on exhale  -Bent knee fall out with inhale/exhale 10x R/L  -Bridges with segmental lowering and  inhale/exhale 5x, with yoga block 5x  -Quadruped opposite UE/LE with inhale/exhale 5x each side  -Quadruped hip abd/ER with inhale/exhale 10x R/L  -Supine march with inhale/exhale 10x -Practice of deep breathing with TA, multifidi and PFM contractions on exhale  -Bent knee fall out with inhale/exhale 10x R/L  -Bridges with ball squeeze and segmental lowering and inhale/exhale  10x  -Side lying clams with inhale/exhale 10x R/L  -Side lying reverse clams with inhale/exhale 10x R/L  -Quadruped opposite UE/LE with inhale/exhale 5x each side  -Child's pose stretch with deep breathing   -Quadruped hip abd/ER with inhale/exhale 10x R/L  -Shuttle Double leg 6 cords with inhale/exhale 15x  -Shuttle Single leg press 3 cords with inhale/exhale 10x R/L  -Mini squats with inhale/exhale 10x  -Mini squats with 8# with inhale/exhale 10x  -Resisted rows green with exhale 15x  -Resisted single arm row with opposite single leg stance green 10x R/L       -Practice of deep breathing with TA, multifidi and PFM contractions on exhale  -Bent knee fall out with inhale/exhale 10x R/L  -Bridges with ball squeeze and segmental lowering and inhale/exhale  10x  -Side lying clams with inhale/exhale 10x R/L  -Side lying reverse clams with inhale/exhale 10x R/L  -Quadruped cat/cow with deep breathing 10x  -Quadruped opposite UE/LE with inhale/exhale 5x each side  -Child's pose stretch with deep breathing 1 min   -Quadruped hip abd/ER with inhale/exhale 10x R/L  -Mini ant lunge with exhale 10x R/L  -Resisted rows green with exhale 15x  -Resisted chest press with exhale Green 15x  -Resisted bilat shoulder pull downs with exhale Red 15x  -Resisted single arm row with opposite single leg stance green 10x R/L  -Cat/cow with inhale/exhale 10x  -Child's pose stretch with deep breathing 1 min     -Side lying clams with inhale/exhale 15x R/L   -Side lying reverse clams with inhale/exhale 15x R/L     -Shuttle DL press 5 cords with deep breathing  15x  -Shuttle SL press 3 cords with deep breathing 15x R/L     -Resisted rows green with exhale 15x   -Resisted bilat shoulder pull downs with exhale Red 15x   -Resisted single arm row with opposite single leg stance blue 10x R/L      Manual Therapy    -Stuck drawer to coccyx 5x  -Quadruped rocking with manual distraction at ischial tuberosities 10x   Therapeutic Activity -Internal pelvic floor assessment completed and discussed findings and POC  -Pt education regarding core 4 muscles      Neuro Re-Ed Min 20 45 43 32   Manual Therapy Min    10   Therapeutic Activity Min 23      Total of Timed Procedures 43 45 43 42   Total of Service Based 0 0 0 0   Total Treatment Time 43 45 43 42         HEP  1/10/25: Diaphragmatic Breathing    Charges     1 Man, 2 Neuro re-ed

## 2025-02-21 ENCOUNTER — OFFICE VISIT (OUTPATIENT)
Dept: PHYSICAL THERAPY | Age: 40
End: 2025-02-21
Attending: OBSTETRICS & GYNECOLOGY
Payer: COMMERCIAL

## 2025-02-21 PROCEDURE — 97530 THERAPEUTIC ACTIVITIES: CPT

## 2025-02-21 PROCEDURE — 97140 MANUAL THERAPY 1/> REGIONS: CPT

## 2025-02-21 PROCEDURE — 97112 NEUROMUSCULAR REEDUCATION: CPT

## 2025-02-21 NOTE — PROGRESS NOTES
Patient: Renetta Milian (39 year old, female) Referring Provider:  Insurance:   Diagnosis: SHO (stress urinary incontinence, female) (N39.3) Ryan Dangelo  [employer] [place]   Date of Surgery: n/a Next MD visit:  N/A   Precautions:  None none scheduled Referral Information:    Date of Evaluation: Req: 0, Auth: 0, Exp:     01/10/25 POC Auth Visits:  10       Today's Date   2/21/2025    Subjective  Pt reports that she complete a bladder diary.  Her tail bone is achy again this week.  She did not feel any increased pain after last visit.       Pain: 2/10     Objective  Reviewed bladder diary: urinary frequency about every 1 1/2 hour.  2x Just in case without urge.  No leakage.             Assessment  Performed STM to bilat coccygeus and pt with only mild tenderness/tighness on right side.  She is tolerating all core stabilization and deep breathing activities without increased c/o coccyx pain.  Pt to work on increasing to 2 hours for urinary frequency and use urge control techniques.    Goals (to be met in 10 visits)   Goals       Therapy Goals      Not Met Progress Toward Partially Met Met   Patient will demonstrate PFM lengthening WNL with coordinated diaphragmatic breathing x10 reps to alleviate PFM pain  [x] [] [] []   Patient will report decreased urinary frequency to <8x/day and 0x/night indicating normalizing micturition reflex for improved bladder health and function. [x] [] [] []   Patient to make use of urinary urge control techniques to eliminate urinary leakage to 0/month [x] [] [] []   Patient will report adherence to HEP for continued exercise benefits following cessation of PT. [x] [] [] []                  Plan  Continue core stabilization. Pt to practice urge control techniques to increase to at least 2 hours between urination.    Treatment Last 4 Visits       1/24/2025 1/31/2025 2/14/2025 2/21/2025   PT Treatment   Treatment Day 3 4 5 6   Neuro Re-Ed -Practice of deep breathing with TA, multifidi and  PFM contractions on exhale  -Bent knee fall out with inhale/exhale 10x R/L  -Bridges with ball squeeze and segmental lowering and inhale/exhale  10x  -Side lying clams with inhale/exhale 10x R/L  -Side lying reverse clams with inhale/exhale 10x R/L  -Quadruped opposite UE/LE with inhale/exhale 5x each side  -Child's pose stretch with deep breathing   -Quadruped hip abd/ER with inhale/exhale 10x R/L  -Shuttle Double leg 6 cords with inhale/exhale 15x  -Shuttle Single leg press 3 cords with inhale/exhale 10x R/L  -Mini squats with inhale/exhale 10x  -Mini squats with 8# with inhale/exhale 10x  -Resisted rows green with exhale 15x  -Resisted single arm row with opposite single leg stance green 10x R/L       -Practice of deep breathing with TA, multifidi and PFM contractions on exhale  -Bent knee fall out with inhale/exhale 10x R/L  -Bridges with ball squeeze and segmental lowering and inhale/exhale  10x  -Side lying clams with inhale/exhale 10x R/L  -Side lying reverse clams with inhale/exhale 10x R/L  -Quadruped cat/cow with deep breathing 10x  -Quadruped opposite UE/LE with inhale/exhale 5x each side  -Child's pose stretch with deep breathing 1 min   -Quadruped hip abd/ER with inhale/exhale 10x R/L  -Mini ant lunge with exhale 10x R/L  -Resisted rows green with exhale 15x  -Resisted chest press with exhale Green 15x  -Resisted bilat shoulder pull downs with exhale Red 15x  -Resisted single arm row with opposite single leg stance green 10x R/L  -Cat/cow with inhale/exhale 10x  -Child's pose stretch with deep breathing 1 min     -Side lying clams with inhale/exhale 15x R/L   -Side lying reverse clams with inhale/exhale 15x R/L     -Shuttle DL press 5 cords with deep breathing 15x  -Shuttle SL press 3 cords with deep breathing 15x R/L     -Resisted rows green with exhale 15x   -Resisted bilat shoulder pull downs with exhale Red 15x   -Resisted single arm row with opposite single leg stance blue 10x R/L    -Cat/cow with  inhale/exhale 10x   -Child's pose stretch with deep breathing 1 min     -Side lying hip abduction with deep breathing 10x R/L  -Side lying reverse clams with inhale/exhale 15x R/L      -Resisted rows green with exhale 15x   -Resisted bilat shoulder pull downs with exhale Red 15x   -Resisted single arm row with opposite single leg stance green 10x R/L     -Stride hip hinges with UE reach with 4# med ball 10x R/L     Manual Therapy   -Stuck drawer to coccyx 5x  -Quadruped rocking with manual distraction at ischial tuberosities 10x -Stuck drawer to coccyx 5x   -Quadruped rocking with manual distraction at ischial tuberosities 10x   -STM to bilat coccygeus externally     Therapeutic Activity    -Reviewed bladder diary: pt educated on goal of urinary frequency 2-4 hours.  Reviewed urinary urge control techniques.     Neuro Re-Ed Min 45 43 32 20   Manual Therapy Min   10 12   Therapeutic Activity Min    10   Total of Timed Procedures 45 43 42 42   Total of Service Based 0 0 0 0   Total Treatment Time 45 43 42 42         HEP  1/10/25: Diaphragmatic Breathing    Charges     1 Ther act, 1 Neuro re-ed, 1 Man

## 2025-02-28 ENCOUNTER — OFFICE VISIT (OUTPATIENT)
Dept: PHYSICAL THERAPY | Age: 40
End: 2025-02-28
Attending: OBSTETRICS & GYNECOLOGY
Payer: COMMERCIAL

## 2025-02-28 PROCEDURE — 97140 MANUAL THERAPY 1/> REGIONS: CPT

## 2025-02-28 PROCEDURE — 97112 NEUROMUSCULAR REEDUCATION: CPT

## 2025-02-28 NOTE — PROGRESS NOTES
Patient: Renetta Milian (39 year old, female) Referring Provider:  Insurance:   Diagnosis: SHO (stress urinary incontinence, female) (N39.3) Ryan Dangelo  [employer] [place]   Date of Surgery: n/a Next MD visit:  N/A   Precautions:  None none scheduled Referral Information:    Date of Evaluation: Req: 0, Auth: 0, Exp:     01/10/25 POC Auth Visits:  10       Today's Date   2/28/2025    Subjective  Pt states that her tail bone is more sore again this week.  She sat on a metal chair to watch her daughter's gymnastics.  She was able to use some of the urge control techniques and was going to the bathroom less frequently for a couple of days this week.       Pain: 2/10     Objective  Palpation: tenderness at L4, L5 spinous processes, mild tenderness right piriformis; no tenderness at PSIS             Assessment  Pt is still having soreness at coccyx aggravated by sitting on a metal chair this week.  Doing well with core activities withou c/o increased pain.    Goals (to be met in 10 visits)   Goals       Therapy Goals      Not Met Progress Toward Partially Met Met   Patient will demonstrate PFM lengthening WNL with coordinated diaphragmatic breathing x10 reps to alleviate PFM pain  [x] [] [] []   Patient will report decreased urinary frequency to <8x/day and 0x/night indicating normalizing micturition reflex for improved bladder health and function. [x] [] [] []   Patient to make use of urinary urge control techniques to eliminate urinary leakage to 0/month [x] [] [] []   Patient will report adherence to HEP for continued exercise benefits following cessation of PT. [x] [] [] []                  Plan  Continue core stabilization with deep breathing activities and manual treatment as needed for coccyx pain.    Treatment Last 4 Visits       1/31/2025 2/14/2025 2/21/2025 2/28/2025   PT Treatment   Treatment Day 4 5 6 7   Neuro Re-Ed -Practice of deep breathing with TA, multifidi and PFM contractions on exhale  -Bent knee  fall out with inhale/exhale 10x R/L  -Bridges with ball squeeze and segmental lowering and inhale/exhale  10x  -Side lying clams with inhale/exhale 10x R/L  -Side lying reverse clams with inhale/exhale 10x R/L  -Quadruped cat/cow with deep breathing 10x  -Quadruped opposite UE/LE with inhale/exhale 5x each side  -Child's pose stretch with deep breathing 1 min   -Quadruped hip abd/ER with inhale/exhale 10x R/L  -Mini ant lunge with exhale 10x R/L  -Resisted rows green with exhale 15x  -Resisted chest press with exhale Green 15x  -Resisted bilat shoulder pull downs with exhale Red 15x  -Resisted single arm row with opposite single leg stance green 10x R/L  -Cat/cow with inhale/exhale 10x  -Child's pose stretch with deep breathing 1 min     -Side lying clams with inhale/exhale 15x R/L   -Side lying reverse clams with inhale/exhale 15x R/L     -Shuttle DL press 5 cords with deep breathing 15x  -Shuttle SL press 3 cords with deep breathing 15x R/L     -Resisted rows green with exhale 15x   -Resisted bilat shoulder pull downs with exhale Red 15x   -Resisted single arm row with opposite single leg stance blue 10x R/L    -Cat/cow with inhale/exhale 10x   -Child's pose stretch with deep breathing 1 min     -Side lying hip abduction with deep breathing 10x R/L  -Side lying reverse clams with inhale/exhale 15x R/L      -Resisted rows green with exhale 15x   -Resisted bilat shoulder pull downs with exhale Red 15x   -Resisted single arm row with opposite single leg stance green 10x R/L     -Stride hip hinges with UE reach with 4# med ball 10x R/L   -Cat/cow with inhale/exhale 10x   -Child's pose stretch with deep breathing 1 min      -Side lying reverse clams with inhale/exhale 20x R/L       -Resisted single arm row with opposite single leg stance blue 10x R/L     -Stride hip hinges with UE reach with 4# med ball 10x R/L   -Mini ant lunges with UE reach with exhale 10x R/L   Manual Therapy  -Stuck drawer to coccyx 5x  -Quadruped  rocking with manual distraction at ischial tuberosities 10x -Stuck drawer to coccyx 5x   -Quadruped rocking with manual distraction at ischial tuberosities 10x   -STM to bilat coccygeus externally   -Stuck drawer to coccyx 5x   -Quadruped rocking with manual distraction at ischial tuberosities 10x   -STM to bilat coccygeus externally and bilat piriformis  -Prone sacral nutation mobilizations with deep breathing     Therapeutic Activity   -Reviewed bladder diary: pt educated on goal of urinary frequency 2-4 hours.  Reviewed urinary urge control techniques.      Neuro Re-Ed Min 43 32 20 23   Manual Therapy Min  10 12 20   Therapeutic Activity Min   10    Total of Timed Procedures 43 42 42 43   Total of Service Based 0 0 0 0   Total Treatment Time 43 42 42 43         HEP  1/10/25: Diaphragmatic Breathing    Charges     2 Neuro re-ed, 1 Man

## 2025-03-04 ENCOUNTER — TELEPHONE (OUTPATIENT)
Facility: CLINIC | Age: 40
End: 2025-03-04

## 2025-03-05 ENCOUNTER — ANESTHESIA (OUTPATIENT)
Dept: ENDOSCOPY | Facility: HOSPITAL | Age: 40
End: 2025-03-05
Payer: COMMERCIAL

## 2025-03-05 ENCOUNTER — ANESTHESIA EVENT (OUTPATIENT)
Dept: ENDOSCOPY | Facility: HOSPITAL | Age: 40
End: 2025-03-05
Payer: COMMERCIAL

## 2025-03-05 ENCOUNTER — HOSPITAL ENCOUNTER (OUTPATIENT)
Facility: HOSPITAL | Age: 40
Setting detail: HOSPITAL OUTPATIENT SURGERY
Discharge: HOME | End: 2025-03-05
Attending: STUDENT IN AN ORGANIZED HEALTH CARE EDUCATION/TRAINING PROGRAM | Admitting: STUDENT IN AN ORGANIZED HEALTH CARE EDUCATION/TRAINING PROGRAM
Payer: COMMERCIAL

## 2025-03-05 VITALS
OXYGEN SATURATION: 98 % | SYSTOLIC BLOOD PRESSURE: 156 MMHG | BODY MASS INDEX: 33.74 KG/M2 | HEIGHT: 67 IN | HEART RATE: 91 BPM | DIASTOLIC BLOOD PRESSURE: 90 MMHG | WEIGHT: 215 LBS | RESPIRATION RATE: 16 BRPM

## 2025-03-05 DIAGNOSIS — K59.00 CONSTIPATION, UNSPECIFIED CONSTIPATION TYPE: ICD-10-CM

## 2025-03-05 DIAGNOSIS — R13.19 ESOPHAGEAL DYSPHAGIA: ICD-10-CM

## 2025-03-05 DIAGNOSIS — K21.9 GASTROESOPHAGEAL REFLUX DISEASE, UNSPECIFIED WHETHER ESOPHAGITIS PRESENT: ICD-10-CM

## 2025-03-05 DIAGNOSIS — R19.4 CHANGE IN BOWEL HABITS: ICD-10-CM

## 2025-03-05 LAB — B-HCG UR QL: NEGATIVE

## 2025-03-05 PROCEDURE — 43239 EGD BIOPSY SINGLE/MULTIPLE: CPT | Performed by: STUDENT IN AN ORGANIZED HEALTH CARE EDUCATION/TRAINING PROGRAM

## 2025-03-05 PROCEDURE — 45380 COLONOSCOPY AND BIOPSY: CPT | Performed by: STUDENT IN AN ORGANIZED HEALTH CARE EDUCATION/TRAINING PROGRAM

## 2025-03-05 PROCEDURE — 0DBE8ZX EXCISION OF LARGE INTESTINE, VIA NATURAL OR ARTIFICIAL OPENING ENDOSCOPIC, DIAGNOSTIC: ICD-10-PCS | Performed by: STUDENT IN AN ORGANIZED HEALTH CARE EDUCATION/TRAINING PROGRAM

## 2025-03-05 PROCEDURE — 0DBL8ZX EXCISION OF TRANSVERSE COLON, VIA NATURAL OR ARTIFICIAL OPENING ENDOSCOPIC, DIAGNOSTIC: ICD-10-PCS | Performed by: STUDENT IN AN ORGANIZED HEALTH CARE EDUCATION/TRAINING PROGRAM

## 2025-03-05 PROCEDURE — 0DB58ZX EXCISION OF ESOPHAGUS, VIA NATURAL OR ARTIFICIAL OPENING ENDOSCOPIC, DIAGNOSTIC: ICD-10-PCS | Performed by: STUDENT IN AN ORGANIZED HEALTH CARE EDUCATION/TRAINING PROGRAM

## 2025-03-05 PROCEDURE — 0DBP8ZX EXCISION OF RECTUM, VIA NATURAL OR ARTIFICIAL OPENING ENDOSCOPIC, DIAGNOSTIC: ICD-10-PCS | Performed by: STUDENT IN AN ORGANIZED HEALTH CARE EDUCATION/TRAINING PROGRAM

## 2025-03-05 PROCEDURE — 0DB98ZX EXCISION OF DUODENUM, VIA NATURAL OR ARTIFICIAL OPENING ENDOSCOPIC, DIAGNOSTIC: ICD-10-PCS | Performed by: STUDENT IN AN ORGANIZED HEALTH CARE EDUCATION/TRAINING PROGRAM

## 2025-03-05 PROCEDURE — 0DB78ZX EXCISION OF STOMACH, PYLORUS, VIA NATURAL OR ARTIFICIAL OPENING ENDOSCOPIC, DIAGNOSTIC: ICD-10-PCS | Performed by: STUDENT IN AN ORGANIZED HEALTH CARE EDUCATION/TRAINING PROGRAM

## 2025-03-05 PROCEDURE — 45385 COLONOSCOPY W/LESION REMOVAL: CPT | Performed by: STUDENT IN AN ORGANIZED HEALTH CARE EDUCATION/TRAINING PROGRAM

## 2025-03-05 RX ORDER — LIDOCAINE HYDROCHLORIDE 10 MG/ML
INJECTION, SOLUTION EPIDURAL; INFILTRATION; INTRACAUDAL; PERINEURAL AS NEEDED
Status: DISCONTINUED | OUTPATIENT
Start: 2025-03-05 | End: 2025-03-05 | Stop reason: SURG

## 2025-03-05 RX ORDER — SODIUM CHLORIDE, SODIUM LACTATE, POTASSIUM CHLORIDE, CALCIUM CHLORIDE 600; 310; 30; 20 MG/100ML; MG/100ML; MG/100ML; MG/100ML
INJECTION, SOLUTION INTRAVENOUS CONTINUOUS
Status: DISCONTINUED | OUTPATIENT
Start: 2025-03-05 | End: 2025-03-05

## 2025-03-05 RX ORDER — ONDANSETRON 2 MG/ML
INJECTION INTRAMUSCULAR; INTRAVENOUS AS NEEDED
Status: DISCONTINUED | OUTPATIENT
Start: 2025-03-05 | End: 2025-03-05 | Stop reason: SURG

## 2025-03-05 RX ORDER — NALOXONE HYDROCHLORIDE 0.4 MG/ML
0.08 INJECTION, SOLUTION INTRAMUSCULAR; INTRAVENOUS; SUBCUTANEOUS ONCE AS NEEDED
Status: DISCONTINUED | OUTPATIENT
Start: 2025-03-05 | End: 2025-03-05

## 2025-03-05 RX ORDER — OMEPRAZOLE 40 MG/1
40 CAPSULE, DELAYED RELEASE ORAL
Qty: 90 CAPSULE | Refills: 0 | Status: SHIPPED | OUTPATIENT
Start: 2025-03-05 | End: 2025-06-03

## 2025-03-05 RX ADMIN — SODIUM CHLORIDE, SODIUM LACTATE, POTASSIUM CHLORIDE, CALCIUM CHLORIDE: 600; 310; 30; 20 INJECTION, SOLUTION INTRAVENOUS at 13:15:00

## 2025-03-05 RX ADMIN — LIDOCAINE HYDROCHLORIDE 100 MG: 10 INJECTION, SOLUTION EPIDURAL; INFILTRATION; INTRACAUDAL; PERINEURAL at 12:34:00

## 2025-03-05 RX ADMIN — ONDANSETRON 4 MG: 2 INJECTION INTRAMUSCULAR; INTRAVENOUS at 13:16:00

## 2025-03-05 NOTE — ANESTHESIA PREPROCEDURE EVALUATION
Anesthesia PreOp Note    HPI:     Renetta Milian is a 39 year old female who presents for preoperative consultation requested by: Se Bullock MD    Date of Surgery: 3/5/2025    Procedure(s):  COLONOSCOPY  ESOPHAGOGASTRODUODENOSCOPY (EGD)  Indication: Change in bowel habits /Esophageal dysphagia /Constipation, unspecified constipation type /Gastroesophageal reflux disease, unspecified whether esophagitis present    Relevant Problems   No relevant active problems       NPO:  Last Liquid Consumption Date: 03/05/25  Last Liquid Consumption Time: 0830  Last Solid Consumption Date: 03/04/25  Last Solid Consumption Time: 0900  Last Liquid Consumption Date: 03/05/25          History Review:  Patient Active Problem List    Diagnosis Date Noted    SOB (shortness of breath) 10/17/2024    Gastroesophageal reflux disease without esophagitis 10/07/2024    Class 1 obesity due to excess calories without serious comorbidity with body mass index (BMI) of 33.0 to 33.9 in adult 08/30/2024    Thyroid cyst 05/07/2019    Subserous leiomyoma of uterus 04/24/2017       Past Medical History:    Arrhythmia    HX OF PVCS    Decorative tattoo    HPV in female    Thyroid cyst    goiter    Varicella    Had chicken pox during childhood       Past Surgical History:   Procedure Laterality Date    Colposcopy, cervix w/upper adjacent vagina; w/biopsy(s), cervix      Leep  2015       Prescriptions Prior to Admission[1]  Current Medications and Prescriptions Ordered in Epic[2]    Allergies[3]    Family History   Problem Relation Age of Onset    Other (alzheimers) Mother     Prostate Cancer Father     Colon Cancer Maternal Aunt     Breast Cancer Paternal Aunt 60        unsure of age     Social History     Socioeconomic History    Marital status:    Tobacco Use    Smoking status: Former     Current packs/day: 0.00     Average packs/day: 0.5 packs/day for 15.0 years (7.5 ttl pk-yrs)     Types: Cigarettes     Start date: 3/17/2002      Quit date: 3/17/2017     Years since quittin.9     Passive exposure: Past    Smokeless tobacco: Never   Vaping Use    Vaping status: Never Used   Substance and Sexual Activity    Alcohol use: Not Currently     Comment: SOCIALLY    Drug use: No    Sexual activity: Yes     Partners: Male   Other Topics Concern    Caffeine Concern Yes     Comment: Coffee 1 cup daily    Pt has a pacemaker No    Pt has a defibrillator No    Breast feeding No    Reaction to local anesthetic No       Available pre-op labs reviewed.  Lab Results   Component Value Date    MCV 89.8 2024    MCHC 33.5 2024    URINEPREG Negative 2025             Vital Signs:  Body mass index is 33.67 kg/m².   height is 1.702 m (5' 7\") and weight is 97.5 kg (215 lb). Her blood pressure is 124/88 and her pulse is 91. Her respiration is 18 and oxygen saturation is 98%.   Vitals:    25 1345 25 1140   BP:  124/88   Pulse:  91   Resp:  18   SpO2:  98%   Weight: 97.5 kg (215 lb)    Height: 1.702 m (5' 7\")         Anesthesia Evaluation     Patient summary reviewed and Nursing notes reviewed    Airway   Mallampati: II  TM distance: >3 FB  Neck ROM: full  Dental - Dentition appears grossly intact     Pulmonary - normal exam    breath sounds clear to auscultation  Cardiovascular - normal exam  Exercise tolerance: good  (+) dysrhythmias    ECG reviewed  Rhythm: regular  Rate: normal  ROS comment: PVC with syptoms: SOB    Neuro/Psych      GI/Hepatic/Renal    (+) GERD    Endo/Other - negative ROS   Abdominal  - normal exam                 Anesthesia Plan:   ASA:  2  Plan:   MAC  Informed Consent Plan and Risks Discussed With:  Patient  Discussed plan with:  Attending      I have informed Renetta Milian and/or legal guardian or family member of the nature of the anesthetic plan, benefits, risks including possible dental damage if relevant, major complications, and any alternative forms of anesthetic management.   All of the patient's  questions were answered to the best of my ability. The patient desires the anesthetic management as planned.  Kellen Skaggs, DIANA  3/5/2025 12:07 PM  Present on Admission:  **None**           [1]   Medications Prior to Admission   Medication Sig Dispense Refill Last Dose/Taking    acidophilus-pectin Oral Cap Take 1 capsule by mouth daily.   Taking    Multiple Vitamins-Minerals (WOMENS MULTIVITAMIN) Oral Tab    2/26/2025    Cholecalciferol (VITAMIN D) 1000 units Oral Tab Take 1,000 Units by mouth daily.     2/26/2025    levocetirizine 5 MG Oral Tab Take 1 tablet (5 mg total) by mouth every evening. (Patient not taking: Reported on 2/27/2025) 90 tablet 1 Not Taking    azelastine 0.1 % Nasal Solution 2 sprays by Nasal route 2 (two) times daily. (Patient not taking: Reported on 1/8/2025) 30 mL 1     polyethylene glycol, PEG 3350-KCl-NaBcb-NaCl-NaSulf, 236 g Oral Recon Soln Take 4,000 mL by mouth As Directed. Take 2,000 mL the night before your procedure and 2,000 mL the morning of your procedure. (Patient not taking: Reported on 1/8/2025) 1 each 0    [2]   Current Facility-Administered Medications Ordered in Epic   Medication Dose Route Frequency Provider Last Rate Last Admin    lactated ringers infusion   Intravenous Continuous Se Bullock MD 20 mL/hr at 03/05/25 1144 New Bag at 03/05/25 1144     No current [facility]-ordered outpatient medications on file.   [3] No Known Allergies

## 2025-03-05 NOTE — OPERATIVE REPORT
ESOPHAGOGASTRODUODENOSCOPY (EGD) & COLONOSCOPY REPORT    Renetta Milian     1985 Age 39 year old   PCP Mayuri Buck MD Endoscopist Se Bullock MD       Date of procedure: 25    Procedure:  Colonoscopy w/ cold snare polypectomy & cold biopsy forceps polypectomy + random colon biopsies & EGD w/biopsies    Pre-operative diagnosis: globus, altered bowel habits    Post-operative diagnosis: see imperssion    Medications: MAC    Withdrawal time: 21 minutes    Complications: none    Procedure: Informed consent was obtained from the patient after the risks of the procedure were discussed, including but not limited to bleeding, perforation, aspiration, infection, or possibility of a missed lesion. We discussed the risks/benefits and alternatives to this procedure, as well as the planned sedation.     Colonoscopy procedure: Once an adequate level of sedation was obtained a digital rectal exam was completed. Then the lubricated tip of the [id number] diagnostic video colonoscope was inserted and advanced without difficulty to the cecum using the CO2 insufflation technique. The cecum was identified by localizing the trifold, the appendix and the ileocecal valve. A routine second examination of the cecum/ascending colon was performed. Withdrawal was begun with thorough washing and careful examination of the colonic walls and folds. Photodocumentation was obtained. The bowel prep was good. Views of the colon were good with washing. I then carefully withdrew the instrument from the patient who tolerated the procedure well.     EGD procedure: The patient was placed in the left lateral decubitus position and begun on continuous blood pressure pulse oximetry and EKG monitoring and this was maintained throughout the procedure. Once an adequate level of sedation was obtained a bite block was placed. Then the lubricated tip of the [id number] diagnostic video upper endoscope was inserted and  advanced using direct visualization into the posterior pharynx and ultimately into the esophagus with  distal extent of the second portion of the duodenum.     Complications: None    Colonoscopy findings:    1. 2 polyps noted as follows:      A. 10 mm polyp in the proximal transverse colon; sessile morphology; cold snare polypectomy performed, polyp retrieved.      B. 2 mm polyp in the rectum; sessile morphology; cold biopsy forceps polypectomy performed, polyp retrieved.    2. Diverticulosis: left sided diverticulosis.  3. Ileocecal valve appeared normal. Terminal ileum was intubated and was normal.  4. The colonic mucosa throughout the colon showed normal vascular pattern, without evidence of angioectasias or inflammation. Biopsied.  5. Rectum was meticulously evaluated in antegrade view and was notable for hemorrhoids.  6. SHITAL: normal rectal tone, no masses palpated.     Colonoscopy Impression:  2 polyps removed.  Normal terminal ileum.  Diverticulosis.  Hemorrhoids.  Colon was otherwise normal with glistening mucosa and intact vascular pattern throughout. Biopsied.    EGD findings:      1. Esophagus: The squamocolumnar junction was noted at 38 cm and appeared regular. The diaphragmatic pinch was noted noted at 38 cm from the incisors. The esophageal mucosa appeared healthy and normal. There was no evidence of esophagitis, stricture or endoscopic evidence of Spann's esophagus. Biopsied  2. Stomach: The stomach distended normally. Normal rugal folds were seen. The pylorus was patent. Retroflexion revealed a normal fundus. The gastric mucosa appeared healthy and normal. Biopsies were taken with a cold forceps from the antrum, incisura and body for histology.   3. Duodenum: The duodenal mucosa appeared normal in the bulb and 2nd portion of the duodenum. Biopsied.    EGD Impression:  -Esophagus: Unremarkable. No esophagitis. Biopsied.  -Stomach: Unremarkable. Biopsied.  -Duodenum: Unremarkable examined portion.  Biopsied.    Recommend:  Await pathology. The interval for the next colonoscopy will be determined after reviewing pathology. If new signs or symptoms develop, colonoscopy may need to be repeated sooner.   High fiber diet.  Monitor for blood in the stool. If having more than just tinge of blood, call office or go to the ER.  Start acid suppression and gaviscon. Take Omeprazole 40mg daily and ensure you take 30 to 60 minutes before either breakfast or dinner. Use gaviscon after meals (liquid formulation which is available over the counter).    >>>If tissue was obtained and you have not received your pathology results either by phone or letter within 2 weeks, please call our office at [phone].    Specimens: polyps, colon, duodenum, gastric, esophagus.    Blood loss: <1 ml

## 2025-03-05 NOTE — ADDENDUM NOTE
Addendum  created 03/05/25 1515 by Kellen Skaggs, CRNA    Review and Sign - Ready for Procedure

## 2025-03-05 NOTE — H&P
History & Physical Examination    Patient Name: Renetta Milian  MRN: [id number]  [facility]: [id number]  YOB: 1985    Diagnosis: gerd, dysphagia, altered bowel habits    Prescriptions Prior to Admission[1]  Current Facility-Administered Medications   Medication Dose Route Frequency    lactated ringers infusion   Intravenous Continuous       Allergies: Allergies[2]    Past Medical History:    Arrhythmia    HX OF PVCS    Decorative tattoo    HPV in female    Thyroid cyst    goiter    Varicella    Had chicken pox during childhood     Past Surgical History:   Procedure Laterality Date    Colposcopy, cervix w/upper adjacent vagina; w/biopsy(s), cervix      Leep       Family History   Problem Relation Age of Onset    Other (alzheimers) Mother     Prostate Cancer Father     Colon Cancer Maternal Aunt     Breast Cancer Paternal Aunt 60        unsure of age     Social History     Tobacco Use    Smoking status: Former     Current packs/day: 0.00     Average packs/day: 0.5 packs/day for 15.0 years (7.5 ttl pk-yrs)     Types: Cigarettes     Start date: 3/17/2002     Quit date: 3/17/2017     Years since quittin.9     Passive exposure: Past    Smokeless tobacco: Never   Substance Use Topics    Alcohol use: Not Currently     Comment: SOCIALLY       SYSTEM Check if Review is Normal Check if Physical Exam is Normal If not normal, please explain:   HEENT [X ] [ X]    NECK  [X ] [ X]    HEART [X ] [ X]    LUNGS [X ] [ X]    ABDOMEN [X ] [ X]    EXTREMITIES [X ] [ X]    OTHER        I have discussed the risks and benefits and alternatives of the procedure with the patient/family.  They understand and agree to proceed with plan of care.   I have reviewed the History and Physical done within the last 30 days.  Any changes noted above.    Se Bullock MD  [facility]                   [1]   Medications Prior to Admission   Medication Sig Dispense Refill Last Dose/Taking    acidophilus-pectin Oral  Cap Take 1 capsule by mouth daily.   Taking    Multiple Vitamins-Minerals (WOMENS MULTIVITAMIN) Oral Tab    2/26/2025    Cholecalciferol (VITAMIN D) 1000 units Oral Tab Take 1,000 Units by mouth daily.     2/26/2025    levocetirizine 5 MG Oral Tab Take 1 tablet (5 mg total) by mouth every evening. (Patient not taking: Reported on 2/27/2025) 90 tablet 1 Not Taking    azelastine 0.1 % Nasal Solution 2 sprays by Nasal route 2 (two) times daily. (Patient not taking: Reported on 1/8/2025) 30 mL 1     polyethylene glycol, PEG 3350-KCl-NaBcb-NaCl-NaSulf, 236 g Oral Recon Soln Take 4,000 mL by mouth As Directed. Take 2,000 mL the night before your procedure and 2,000 mL the morning of your procedure. (Patient not taking: Reported on 1/8/2025) 1 each 0    [2] No Known Allergies

## 2025-03-05 NOTE — DISCHARGE INSTRUCTIONS
Home Care Instructions for Colonoscopy and Gastroscopy with Sedation    Diet:  - Resume your regular diet as tolerated unless otherwise instructed.  - Start with light meals to minimize bloating.  - Do not drink alcohol today.    Medication:  - If you have questions about resuming your normal medications, please contact your Primary Care Physician.    Activities:  - Take it easy today. Do not return to work today.  - Do not drive today.  - Do not operate any machinery today (including kitchen equipment).    Colonoscopy:  - You may notice some rectal \"spotting\" (a little blood on the toilet tissue) for a day or two after the exam. This is normal.  - If you experience any rectal bleeding (not spotting), persistent tenderness or sharp severe abdominal pains, oral temperature over 100 degrees Fahrenheit, light-headedness or dizziness, or any other problems, contact your doctor.    Gastroscopy:  - You may have a sore throat for 2-3 days following the exam. This is normal. Gargling with warm salt water (1/2 tsp salt to 1 glass warm water) or using throat lozenges will help.  - If you experience any sharp pain in your neck, abdomen or chest, vomiting of blood, oral temperature over 100 degrees Fahrenheit, light-headedness or dizziness, or any other problems, contact your doctor.    **If unable to reach your doctor, please go to the [facility] Emergency Room**    - Your referring physician will receive a full report of your examination.  - If you do not hear from your doctor's office within two weeks of your biopsy, please call them for your results.    You may be able to see your laboratory results in [employer] between 4 and 7 business days.  In some cases, your physician may not have viewed the results before they are released to [employer].  If you have questions regarding your results contact the physician who ordered the test/exam by phone or via [employer] by choosing \"Ask a Medical Question.\"

## 2025-03-05 NOTE — ANESTHESIA POSTPROCEDURE EVALUATION
Patient: Renetta Milian    Procedure Summary       Date: 03/05/25 Room / Location: [facility] ENDOSCOPY 03 / EM ENDOSCOPY    Anesthesia Start: 1231 Anesthesia Stop: 1322    Procedures:       COLONOSCOPY      ESOPHAGOGASTRODUODENOSCOPY (EGD) Diagnosis:       Change in bowel habits      Esophageal dysphagia      Constipation, unspecified constipation type      Gastroesophageal reflux disease, unspecified whether esophagitis present      (Colon polyps, hemorrhoids, normal EGD)    Surgeons: Se Bullock MD Anesthesiologist: Kellen Skaggs CRNA    Anesthesia Type: MAC ASA Status: 2            Anesthesia Type: MAC    Vitals Value Taken Time   /90 03/05/25 1336   Temp 97 03/05/25 1347   Pulse 87 03/05/25 1340   Resp 17 03/05/25 1340   SpO2 100 % 03/05/25 1340   Vitals shown include unfiled device data.    [facility] AN Post Evaluation:   Patient Evaluated in PACU  Patient Participation: waiting for patient participation  Level of Consciousness: responsive to verbal stimuli  Pain Score: 0  Pain Management: adequate  Airway Patency:patent  Yes    Nausea/Vomiting: none  Cardiovascular Status: acceptable  Respiratory Status: acceptable  Postoperative Hydration acceptable      Kellen Skaggs CRNA  3/5/2025 1:47 PM

## 2025-03-07 ENCOUNTER — OFFICE VISIT (OUTPATIENT)
Dept: PHYSICAL THERAPY | Age: 40
End: 2025-03-07
Attending: OBSTETRICS & GYNECOLOGY
Payer: COMMERCIAL

## 2025-03-07 ENCOUNTER — TELEPHONE (OUTPATIENT)
Facility: CLINIC | Age: 40
End: 2025-03-07

## 2025-03-07 PROCEDURE — 97112 NEUROMUSCULAR REEDUCATION: CPT

## 2025-03-07 PROCEDURE — 97140 MANUAL THERAPY 1/> REGIONS: CPT

## 2025-03-07 NOTE — TELEPHONE ENCOUNTER
Health Maintenance Updated.    3 year colonoscopy recall entered into patient outreach in [facility].  Next colonoscopy will be due 3/5/2028.

## 2025-03-07 NOTE — PROGRESS NOTES
Patient: Renetta Milian (39 year old, female) Referring Provider:  Insurance:   Diagnosis: SHO (stress urinary incontinence, female) (N39.3) Ryan Dangelo  [employer]   Date of Surgery: n/a Next MD visit:  N/A   Precautions:  None none scheduled Referral Information:    Date of Evaluation: Req: 0, Auth: 0, Exp:     01/10/25 POC Auth Visits:  10       Today's Date   3/7/2025    Subjective  Pt reports that her tail bone was sore after taking a walk and standing a lot for her daughter's birthday party last weekend.  The rest of the week felt better.  She has been better with urinary urgency until Wednesday when she had a colonscopy.       Pain: 0/10     Objective  See treatment log below.            Assessment  Reviewed body mechanics with hip hinges for picking things up from floor.  Pt continues to do well with all core/diaphragmatic breathing activities and will continue with this for HEP and f/u to re-assess.    Goals (to be met in 10 visits)   Therapy Goals          Not Met Progress Toward Partially Met Met   Patient will demonstrate PFM lengthening WNL with coordinated diaphragmatic breathing x10 reps to alleviate PFM pain  []  [x]  []  []    Patient will report decreased urinary frequency to <8x/day and 0x/night indicating normalizing micturition reflex for improved bladder health and function. []  [x]  []  []    Patient to make use of urinary urge control techniques to eliminate urinary leakage to 0/month []  [x]  []  []    Patient will report adherence to HEP for continued exercise benefits following cessation of PT. []  [x]  []  []        Plan  F/u in 3-4 weeks to re-assess and progress as able.    Treatment Last 4 Visits       2/14/2025 2/21/2025 2/28/2025 3/7/2025   PT Treatment   Treatment Day 5 6 7 8   Neuro Re-Ed -Cat/cow with inhale/exhale 10x  -Child's pose stretch with deep breathing 1 min     -Side lying clams with inhale/exhale 15x R/L   -Side lying reverse clams with inhale/exhale 15x R/L      -Shuttle DL press 5 cords with deep breathing 15x  -Shuttle SL press 3 cords with deep breathing 15x R/L     -Resisted rows green with exhale 15x   -Resisted bilat shoulder pull downs with exhale Red 15x   -Resisted single arm row with opposite single leg stance blue 10x R/L    -Cat/cow with inhale/exhale 10x   -Child's pose stretch with deep breathing 1 min     -Side lying hip abduction with deep breathing 10x R/L  -Side lying reverse clams with inhale/exhale 15x R/L      -Resisted rows green with exhale 15x   -Resisted bilat shoulder pull downs with exhale Red 15x   -Resisted single arm row with opposite single leg stance green 10x R/L     -Stride hip hinges with UE reach with 4# med ball 10x R/L   -Cat/cow with inhale/exhale 10x   -Child's pose stretch with deep breathing 1 min      -Side lying reverse clams with inhale/exhale 20x R/L       -Resisted single arm row with opposite single leg stance blue 10x R/L     -Stride hip hinges with UE reach with 4# med ball 10x R/L   -Mini ant lunges with UE reach with exhale 10x R/L    Manual Therapy -Stuck drawer to coccyx 5x  -Quadruped rocking with manual distraction at ischial tuberosities 10x -Stuck drawer to coccyx 5x   -Quadruped rocking with manual distraction at ischial tuberosities 10x   -STM to bilat coccygeus externally   -Stuck drawer to coccyx 5x   -Quadruped rocking with manual distraction at ischial tuberosities 10x   -STM to bilat coccygeus externally and bilat piriformis  -Prone sacral nutation mobilizations with deep breathing      Therapeutic Activity  -Reviewed bladder diary: pt educated on goal of urinary frequency 2-4 hours.  Reviewed urinary urge control techniques.       Neuro Re-Ed Min 32 20 23    Manual Therapy Min 10 12 20    Therapeutic Activity Min  10     Total of Timed Procedures 42 42 43    Total of Service Based 0 0 0    Total Treatment Time 42 42 43           2/14/2025 2/21/2025 2/28/2025 3/7/2025   Pelvic Treatment   Treatment Day 5 6  7 8   Neuro Re-Education    -Cat/cow with inhale/exhale 10x   -Child's pose stretch with deep breathing 1 min      -Side lying reverse clams with inhale/exhale 20x R/L       -Resisted single arm row with opposite single leg stance blue 10x R/L     -Single leg dead lifts/hip hinges 10x R/L  -Mini ant lunges with UE reach  with 4# med ball with exhale 10x R/L      Manual Therapy    -Quadruped rocking with manual distraction at ischial tuberosities 10x   -STM to bilat coccygeus externally and bilat piriformis   -Prone sacral nutation mobilizations with deep breathing      Neuro Re-Educ Minutes    30   Manual Therapy Minutes    12   Total Time Of Timed Procedures    42   Total Time Of Service-Based Procedures    0   Total Treatment Time    42        HEP       Charges  2 neuro re-ed, 1 man

## 2025-03-07 NOTE — PROGRESS NOTES
See separate TE regarding recall.  Mychart sent regarding results.  10 mm adenoma removed -- colon in 3 years.  Biopsies throughout the entire GI tract unremarkable -- plan is outlined in procedure report.

## 2025-03-07 NOTE — TELEPHONE ENCOUNTER
GI Staff:    Can you please place recall for this patient to have a colonoscopy in 3 years.    Thank you.    Se Bullock MD

## 2025-03-14 ENCOUNTER — OFFICE VISIT (OUTPATIENT)
Dept: INTERNAL MEDICINE CLINIC | Facility: CLINIC | Age: 40
End: 2025-03-14

## 2025-03-14 ENCOUNTER — LAB ENCOUNTER (OUTPATIENT)
Dept: LAB | Age: 40
End: 2025-03-14
Attending: INTERNAL MEDICINE
Payer: COMMERCIAL

## 2025-03-14 VITALS
BODY MASS INDEX: 33.9 KG/M2 | HEART RATE: 66 BPM | WEIGHT: 216 LBS | DIASTOLIC BLOOD PRESSURE: 76 MMHG | HEIGHT: 67 IN | SYSTOLIC BLOOD PRESSURE: 120 MMHG

## 2025-03-14 DIAGNOSIS — M25.50 MULTIPLE JOINT PAIN: ICD-10-CM

## 2025-03-14 DIAGNOSIS — Z00.00 ANNUAL PHYSICAL EXAM: Primary | ICD-10-CM

## 2025-03-14 DIAGNOSIS — Z00.00 ANNUAL PHYSICAL EXAM: ICD-10-CM

## 2025-03-14 LAB
ALBUMIN SERPL-MCNC: 4.6 G/DL (ref 3.2–4.8)
ALBUMIN/GLOB SERPL: 1.6 {RATIO} (ref 1–2)
ALP LIVER SERPL-CCNC: 62 U/L
ALT SERPL-CCNC: 106 U/L
ANION GAP SERPL CALC-SCNC: 8 MMOL/L (ref 0–18)
AST SERPL-CCNC: 42 U/L (ref ?–34)
BASOPHILS # BLD AUTO: 0.05 X10(3) UL (ref 0–0.2)
BASOPHILS NFR BLD AUTO: 0.8 %
BILIRUB SERPL-MCNC: 0.7 MG/DL (ref 0.3–1.2)
BUN BLD-MCNC: 11 MG/DL (ref 9–23)
BUN/CREAT SERPL: 14.1 (ref 10–20)
C3 SERPL-MCNC: 144.1 MG/DL (ref 84–160)
C4 SERPL-MCNC: 23 MG/DL (ref 12–36)
CALCIUM BLD-MCNC: 9.8 MG/DL (ref 8.7–10.4)
CHLORIDE SERPL-SCNC: 109 MMOL/L (ref 98–112)
CHOLEST SERPL-MCNC: 151 MG/DL (ref ?–200)
CO2 SERPL-SCNC: 24 MMOL/L (ref 21–32)
CREAT BLD-MCNC: 0.78 MG/DL
CRP SERPL-MCNC: <0.4 MG/DL (ref ?–1)
DEPRECATED RDW RBC AUTO: 40.2 FL (ref 35.1–46.3)
EGFRCR SERPLBLD CKD-EPI 2021: 99 ML/MIN/1.73M2 (ref 60–?)
EOSINOPHIL # BLD AUTO: 0.05 X10(3) UL (ref 0–0.7)
EOSINOPHIL NFR BLD AUTO: 0.8 %
ERYTHROCYTE [DISTWIDTH] IN BLOOD BY AUTOMATED COUNT: 12.6 % (ref 11–15)
FASTING PATIENT LIPID ANSWER: NO
FASTING STATUS PATIENT QL REPORTED: NO
GLOBULIN PLAS-MCNC: 2.8 G/DL (ref 2–3.5)
GLUCOSE BLD-MCNC: 93 MG/DL (ref 70–99)
HCT VFR BLD AUTO: 41.5 %
HDLC SERPL-MCNC: 56 MG/DL (ref 40–59)
HGB BLD-MCNC: 13.9 G/DL
IMM GRANULOCYTES # BLD AUTO: 0.01 X10(3) UL (ref 0–1)
IMM GRANULOCYTES NFR BLD: 0.2 %
LDLC SERPL CALC-MCNC: 83 MG/DL (ref ?–100)
LYMPHOCYTES # BLD AUTO: 1.79 X10(3) UL (ref 1–4)
LYMPHOCYTES NFR BLD AUTO: 29.5 %
MAGNESIUM SERPL-MCNC: 2.1 MG/DL (ref 1.6–2.6)
MCH RBC QN AUTO: 29.3 PG (ref 26–34)
MCHC RBC AUTO-ENTMCNC: 33.5 G/DL (ref 31–37)
MCV RBC AUTO: 87.4 FL
MONOCYTES # BLD AUTO: 0.53 X10(3) UL (ref 0.1–1)
MONOCYTES NFR BLD AUTO: 8.7 %
NEUTROPHILS # BLD AUTO: 3.63 X10 (3) UL (ref 1.5–7.7)
NEUTROPHILS # BLD AUTO: 3.63 X10(3) UL (ref 1.5–7.7)
NEUTROPHILS NFR BLD AUTO: 60 %
NONHDLC SERPL-MCNC: 95 MG/DL (ref ?–130)
OSMOLALITY SERPL CALC.SUM OF ELEC: 291 MOSM/KG (ref 275–295)
PLATELET # BLD AUTO: 262 10(3)UL (ref 150–450)
POTASSIUM SERPL-SCNC: 4.8 MMOL/L (ref 3.5–5.1)
PROT SERPL-MCNC: 7.4 G/DL (ref 5.7–8.2)
RBC # BLD AUTO: 4.75 X10(6)UL
RHEUMATOID FACT SERPL-ACNC: 23.3 IU/ML (ref ?–14)
SODIUM SERPL-SCNC: 141 MMOL/L (ref 136–145)
TRIGL SERPL-MCNC: 61 MG/DL (ref 30–149)
TSI SER-ACNC: 0.85 UIU/ML (ref 0.55–4.78)
VIT D+METAB SERPL-MCNC: 34.7 NG/ML (ref 30–100)
VLDLC SERPL CALC-MCNC: 10 MG/DL (ref 0–30)
WBC # BLD AUTO: 6.1 X10(3) UL (ref 4–11)

## 2025-03-14 PROCEDURE — 3078F DIAST BP <80 MM HG: CPT | Performed by: INTERNAL MEDICINE

## 2025-03-14 PROCEDURE — 84443 ASSAY THYROID STIM HORMONE: CPT

## 2025-03-14 PROCEDURE — 86038 ANTINUCLEAR ANTIBODIES: CPT

## 2025-03-14 PROCEDURE — 86160 COMPLEMENT ANTIGEN: CPT

## 2025-03-14 PROCEDURE — 3008F BODY MASS INDEX DOCD: CPT | Performed by: INTERNAL MEDICINE

## 2025-03-14 PROCEDURE — 80053 COMPREHEN METABOLIC PANEL: CPT

## 2025-03-14 PROCEDURE — 99213 OFFICE O/P EST LOW 20 MIN: CPT | Performed by: INTERNAL MEDICINE

## 2025-03-14 PROCEDURE — 80061 LIPID PANEL: CPT

## 2025-03-14 PROCEDURE — 82306 VITAMIN D 25 HYDROXY: CPT

## 2025-03-14 PROCEDURE — 83735 ASSAY OF MAGNESIUM: CPT

## 2025-03-14 PROCEDURE — 85025 COMPLETE CBC W/AUTO DIFF WBC: CPT

## 2025-03-14 PROCEDURE — 86225 DNA ANTIBODY NATIVE: CPT

## 2025-03-14 PROCEDURE — 84165 PROTEIN E-PHORESIS SERUM: CPT

## 2025-03-14 PROCEDURE — 86431 RHEUMATOID FACTOR QUANT: CPT

## 2025-03-14 PROCEDURE — 86140 C-REACTIVE PROTEIN: CPT

## 2025-03-14 PROCEDURE — 3074F SYST BP LT 130 MM HG: CPT | Performed by: INTERNAL MEDICINE

## 2025-03-14 PROCEDURE — 36415 COLL VENOUS BLD VENIPUNCTURE: CPT

## 2025-03-14 PROCEDURE — 82103 ALPHA-1-ANTITRYPSIN TOTAL: CPT

## 2025-03-14 NOTE — PROGRESS NOTES
Renetta Milian is a 39 year old female.  Chief Complaint   Patient presents with    Follow - Up     HPI:    Patient presented today for follow up. She states that she has been overall feeling unwell. Multiple symptoms ongoing since October. It started with an upper respiratory problem.   Pvcs have increased - saw Dr. Weldon for it  Numbness and tingling on off anywhere in the body  Shoulder blade R hurts  Trouble sleeping  Brain fog  Redness in the face   All joints hurt   Does yoga, works out   Shortness of breath in October and went to an Er saw a pulmonologist  Still feels cannot get a full breath in   Multiple joint pains. Hands always hurt    Saw Dr. Chi- has dust allergies  Saw Gi and underwent EGD/cscope- polyp removed, repeat in 3 years  Saw Dr. Weldon- stress test done and giovanny       Current Outpatient Medications   Medication Sig Dispense Refill    Omeprazole 40 MG Oral Capsule Delayed Release Take 1 capsule (40 mg total) by mouth every morning before breakfast. 90 capsule 0    acidophilus-pectin Oral Cap Take 1 capsule by mouth daily.      Multiple Vitamins-Minerals (WOMENS MULTIVITAMIN) Oral Tab       Cholecalciferol (VITAMIN D) 1000 units Oral Tab Take 1,000 Units by mouth daily.        levocetirizine 5 MG Oral Tab Take 1 tablet (5 mg total) by mouth every evening. (Patient not taking: Reported on 3/14/2025) 90 tablet 1    azelastine 0.1 % Nasal Solution 2 sprays by Nasal route 2 (two) times daily. (Patient not taking: Reported on 3/14/2025) 30 mL 1    polyethylene glycol, PEG 3350-KCl-NaBcb-NaCl-NaSulf, 236 g Oral Recon Soln Take 4,000 mL by mouth As Directed. Take 2,000 mL the night before your procedure and 2,000 mL the morning of your procedure. (Patient not taking: Reported on 3/14/2025) 1 each 0      Past Medical History:    Arrhythmia    HX OF PVCS    Decorative tattoo    H/O colonoscopy with polypectomy    History of esophagogastroduodenoscopy (EGD)    HPV in female    Thyroid cyst    goiter     Varicella    Had chicken pox during childhood      Past Surgical History:   Procedure Laterality Date    Colonoscopy N/A 3/5/2025    Procedure: COLONOSCOPY;  Surgeon: Se Bullock MD;  Location: [facility] ENDOSCOPY    Colposcopy, cervix w/upper adjacent vagina; w/biopsy(s), cervix      Leep        Social History:  Social History     Socioeconomic History    Marital status:    Tobacco Use    Smoking status: Former     Current packs/day: 0.00     Average packs/day: 0.5 packs/day for 15.0 years (7.5 ttl pk-yrs)     Types: Cigarettes     Start date: 3/17/2002     Quit date: 3/17/2017     Years since quittin.9     Passive exposure: Past    Smokeless tobacco: Never   Vaping Use    Vaping status: Never Used   Substance and Sexual Activity    Alcohol use: Not Currently     Comment: SOCIALLY    Drug use: No    Sexual activity: Yes     Partners: Male   Other Topics Concern    Caffeine Concern Yes     Comment: Coffee 1 cup daily    Pt has a pacemaker No    Pt has a defibrillator No    Breast feeding No    Reaction to local anesthetic No     Social Drivers of Health     Food Insecurity: No Food Insecurity (2023)    Food Insecurity     Food Insecurity: Never true   Transportation Needs: No Transportation Needs (2023)    Transportation Needs     Lack of Transportation: No   Stress: No Stress Concern Present (2023)    Stress     Feeling of Stress : No   Housing Stability: Low Risk  (2023)    Housing Stability     Housing Instability: No      Family History   Problem Relation Age of Onset    Other (alzheimers) Mother     Prostate Cancer Father     Colon Cancer Maternal Aunt     Breast Cancer Paternal Aunt 60        unsure of age      Allergies[1]     REVIEW OF SYSTEMS:   Review of Systems   Review of Systems   Constitutional: Negative for activity change, appetite change and fever.   HENT: Negative for congestion and voice change.    Respiratory: Negative for cough and shortness of breath.     Cardiovascular: Negative for chest pain.   Gastrointestinal: Negative for abdominal distention, abdominal pain and vomiting.   Genitourinary: Negative for hematuria.   Skin: Negative for wound.   Psychiatric/Behavioral: Negative for behavioral problems.   Wt Readings from Last 5 Encounters:   03/14/25 216 lb (98 kg)   02/27/25 215 lb (97.5 kg)   11/27/24 217 lb (98.4 kg)   11/26/24 218 lb (98.9 kg)   11/15/24 220 lb (99.8 kg)     Body mass index is 33.83 kg/m².      EXAM:   /76   Pulse 66   Ht 5' 7\" (1.702 m)   Wt 216 lb (98 kg)   LMP 03/03/2025 (Approximate)   BMI 33.83 kg/m²   Physical Exam   Constitutional:       Appearance: Normal appearance.   HENT:      Head: Normocephalic.   Eyes:      Conjunctiva/sclera: Conjunctivae normal.   Breast:  Normal bilateral breast exam. No palpable masses or nodules.   No nipples asymmetry or discharge. No skin changes   Cardiovascular:      Rate and Rhythm: Normal rate and regular rhythm.      Heart sounds: Normal heart sounds. No murmur heard.  Pulmonary:      Effort: Pulmonary effort is normal.      Breath sounds: Normal breath sounds. No rhonchi or rales.   Abdominal:      General: Bowel sounds are normal.      Palpations: Abdomen is soft.      Tenderness: There is no abdominal tenderness.   Musculoskeletal:      Cervical back: Neck supple.      Right lower leg: No edema.      Left lower leg: No edema.   Skin:     General: Skin is warm and dry.   Neurological:      General: No focal deficit present.      Mental Status: He is alert and oriented to person, place, and time. Mental status is at baseline.   Psychiatric:         Mood and Affect: Mood normal.         Behavior: Behavior normal.       ASSESSMENT AND PLAN:     Assessment & Plan  Annual physical exam  - will check annual labs  - return for physical   Orders:    Comp Metabolic Panel (14); Future    Lipid Panel; Future    TSH W Reflex To Free T4; Future    Vitamin D; Future    CBC With Differential With  Platelet; Future    Magnesium [E]; Future    Multiple joint pain  - Patient with multiple general symptoms  - Will check blood work   - increase hydration   - continue multivitamin  - can take magnesium at night   Orders:    Rheumatoid Arthritis Factor; Future    Complement C3, Serum; Future    Complement C4, Serum; Future    SERUM PROTEIN ELECTROPHORESIS; Future    C-Reactive Protein; Future    Alpha-1-antirypsin, serum; Future    Connective Tissue Disease (MILAGROS) Screen; Future     The patient indicates understanding of these issues and agrees to the plan.  Follow up in 3 months    Mayuri Buck MD     This note was created by Dragon voice recognition. Errors in content may be related to improper recognition by the system; efforts to review and correct have been done but errors may still exist. Please be advised the primary purpose of this note is for me to communicate medical care. Standard sentence structure is not always used. Medical terminology and medical abbreviations may be used. There may be grammatical, typographical, and automated fill ins that may have errors missed in proofreading.        [1] No Known Allergies

## 2025-03-15 LAB — A-1-ANTITRYPSIN: 142 MG/DL

## 2025-03-17 LAB
ALBUMIN SERPL ELPH-MCNC: 4.15 G/DL (ref 3.75–5.21)
ALBUMIN/GLOB SERPL: 1.4 {RATIO} (ref 1–2)
ALPHA1 GLOB SERPL ELPH-MCNC: 0.27 G/DL (ref 0.19–0.46)
ALPHA2 GLOB SERPL ELPH-MCNC: 0.65 G/DL (ref 0.48–1.05)
B-GLOBULIN SERPL ELPH-MCNC: 0.87 G/DL (ref 0.68–1.23)
DSDNA IGG SERPL IA-ACNC: 1.7 IU/ML
ENA AB SER QL IA: 0.2 UG/L
ENA AB SER QL IA: NEGATIVE
GAMMA GLOB SERPL ELPH-MCNC: 1.16 G/DL (ref 0.62–1.7)
PROT SERPL-MCNC: 7.1 G/DL (ref 5.7–8.2)

## 2025-03-20 ENCOUNTER — TELEPHONE (OUTPATIENT)
Dept: INTERNAL MEDICINE CLINIC | Facility: CLINIC | Age: 40
End: 2025-03-20

## 2025-03-20 DIAGNOSIS — M25.50 MULTIPLE JOINT PAIN: Primary | ICD-10-CM

## 2025-03-21 ENCOUNTER — PATIENT MESSAGE (OUTPATIENT)
Dept: INTERNAL MEDICINE CLINIC | Facility: CLINIC | Age: 40
End: 2025-03-21

## 2025-03-21 DIAGNOSIS — R74.8 ELEVATED LIVER ENZYMES: Primary | ICD-10-CM

## 2025-03-21 NOTE — TELEPHONE ENCOUNTER
Patient asking if Omeprazole could contribute to abnormal liver function testing. Plan to recheck in one month although no orders placed, what testing did you want to recheck?     Please advise.     Per result notes:     Demetrio Jackson,   Your labs were reviewed.  - liver enzymes are elevated. I recommend you repeat liver blood test in 1 months  - Rheumatoid factor is positive which could indicate an autoimmune process. Rheumatoid factor by itself is very non specific finding. Rest of the labs are normal. I recommend you meet with a rheumatologist and get evaluated for your symptoms in the light of positive rheumatoid factor.  I have placed a referral in your Mychart.  Please feel free to reach me for any questions or concerns.  Thank you  Mayuri Buck M.D.   Written by Mayuri Buck MD on 3/20/2025  5:16 PM CDT  Seen by patient Renetta Clarkedeann on 3/20/2025  8:05 PM

## 2025-04-07 ENCOUNTER — APPOINTMENT (OUTPATIENT)
Dept: PHYSICAL THERAPY | Age: 40
End: 2025-04-07
Attending: OBSTETRICS & GYNECOLOGY
Payer: COMMERCIAL

## 2025-04-18 ENCOUNTER — LAB ENCOUNTER (OUTPATIENT)
Dept: LAB | Age: 40
End: 2025-04-18
Attending: INTERNAL MEDICINE
Payer: COMMERCIAL

## 2025-04-18 DIAGNOSIS — R74.8 ELEVATED LIVER ENZYMES: ICD-10-CM

## 2025-04-18 LAB
ALBUMIN SERPL-MCNC: 4.2 G/DL (ref 3.2–4.8)
ALP LIVER SERPL-CCNC: 62 U/L (ref 37–98)
ALT SERPL-CCNC: 12 U/L (ref 10–49)
AST SERPL-CCNC: 12 U/L (ref ?–34)
BILIRUB DIRECT SERPL-MCNC: 0.2 MG/DL (ref ?–0.3)
BILIRUB SERPL-MCNC: 0.7 MG/DL (ref 0.3–1.2)
PROT SERPL-MCNC: 7.1 G/DL (ref 5.7–8.2)

## 2025-04-18 PROCEDURE — 80076 HEPATIC FUNCTION PANEL: CPT

## 2025-04-18 PROCEDURE — 36415 COLL VENOUS BLD VENIPUNCTURE: CPT

## 2025-05-02 ENCOUNTER — TELEPHONE (OUTPATIENT)
Dept: INTERNAL MEDICINE CLINIC | Facility: CLINIC | Age: 40
End: 2025-05-02

## 2025-05-02 ENCOUNTER — PATIENT MESSAGE (OUTPATIENT)
Dept: INTERNAL MEDICINE CLINIC | Facility: CLINIC | Age: 40
End: 2025-05-02

## 2025-05-02 DIAGNOSIS — R42 VERTIGO: Primary | ICD-10-CM

## 2025-05-02 DIAGNOSIS — H81.13 BENIGN PAROXYSMAL POSITIONAL VERTIGO DUE TO BILATERAL VESTIBULAR DISORDER: Primary | ICD-10-CM

## 2025-05-03 ENCOUNTER — HOSPITAL ENCOUNTER (OUTPATIENT)
Age: 40
Discharge: HOME | End: 2025-05-03
Payer: COMMERCIAL

## 2025-05-03 VITALS
SYSTOLIC BLOOD PRESSURE: 134 MMHG | DIASTOLIC BLOOD PRESSURE: 81 MMHG | RESPIRATION RATE: 18 BRPM | OXYGEN SATURATION: 100 % | HEART RATE: 68 BPM | TEMPERATURE: 98 F

## 2025-05-03 DIAGNOSIS — B34.9 VIRAL ILLNESS: Primary | ICD-10-CM

## 2025-05-03 LAB
B-HCG UR QL: NEGATIVE
BASOPHILS # BLD AUTO: 0.04 X10(3) UL (ref 0–0.2)
BASOPHILS NFR BLD AUTO: 0.8 %
BILIRUB UR QL STRIP: NEGATIVE
BUN BLD-MCNC: 13 MG/DL (ref 7–18)
CHLORIDE BLD-SCNC: 104 MMOL/L (ref 98–112)
CLARITY UR: CLEAR
CO2 BLD-SCNC: 24 MMOL/L (ref 21–32)
COLOR UR: YELLOW
CREAT BLD-MCNC: 0.8 MG/DL (ref 0.55–1.02)
DEPRECATED RDW RBC AUTO: 41.6 FL (ref 35.1–46.3)
EGFRCR SERPLBLD CKD-EPI 2021: 96 ML/MIN/1.73M2 (ref 60–?)
EOSINOPHIL # BLD AUTO: 0.08 X10(3) UL (ref 0–0.7)
EOSINOPHIL NFR BLD AUTO: 1.5 %
ERYTHROCYTE [DISTWIDTH] IN BLOOD BY AUTOMATED COUNT: 12.8 % (ref 11–15)
GLUCOSE BLD-MCNC: 105 MG/DL (ref 70–99)
GLUCOSE UR STRIP-MCNC: NEGATIVE MG/DL
HCT VFR BLD AUTO: 41.3 % (ref 35–48)
HCT VFR BLD AUTO: 41.8 % (ref 35–48)
HCT VFR BLD CALC: 43 % (ref 34–50)
HGB BLD-MCNC: 13.7 G/DL (ref 12–16)
HGB BLD-MCNC: 13.9 G/DL (ref 12–16)
HGB UR QL STRIP: NEGATIVE
IMM GRANULOCYTES # BLD AUTO: 0.01 X10(3) UL (ref 0–1)
IMM GRANULOCYTES NFR BLD: 0.2 %
ISTAT IONIZED CALCIUM FOR CHEM 8: 1.2 MMOL/L (ref 1.12–1.32)
KETONES UR STRIP-MCNC: NEGATIVE MG/DL
LEUKOCYTE ESTERASE UR QL STRIP: NEGATIVE
LYMPHOCYTES # BLD AUTO: 1.54 X10(3) UL (ref 1–4)
LYMPHOCYTES NFR BLD AUTO: 29.7 %
MCH RBC QN AUTO: 29.7 PG (ref 26–34)
MCH RBC QN AUTO: 29.8 PG (ref 26–34)
MCHC RBC AUTO-ENTMCNC: 32.8 G/DL (ref 31–37)
MCHC RBC AUTO-ENTMCNC: 33.7 G/DL (ref 31–37)
MCV RBC AUTO: 88.6 FL (ref 80–100)
MCV RBC AUTO: 90.5 FL (ref 80–100)
MONOCYTES # BLD AUTO: 0.67 X10(3) UL (ref 0.1–1)
MONOCYTES NFR BLD AUTO: 12.9 %
NEUTROPHILS # BLD AUTO: 2.85 X10 (3) UL (ref 1.5–7.7)
NEUTROPHILS # BLD AUTO: 2.85 X10(3) UL (ref 1.5–7.7)
NEUTROPHILS NFR BLD AUTO: 54.9 %
NITRITE UR QL STRIP: NEGATIVE
PH UR STRIP: 6 [PH]
PLATELET # BLD AUTO: 277 X10ˆ3/UL (ref 150–450)
PLATELET # BLD AUTO: 287 10(3)UL (ref 150–450)
POTASSIUM BLD-SCNC: 5 MMOL/L (ref 3.6–5.1)
PROT UR STRIP-MCNC: NEGATIVE MG/DL
RBC # BLD AUTO: 4.62 X10ˆ6/UL (ref 3.8–5.3)
RBC # BLD AUTO: 4.66 X10(6)UL (ref 3.8–5.3)
SODIUM BLD-SCNC: 141 MMOL/L (ref 136–145)
SP GR UR STRIP: <=1.005
UROBILINOGEN UR STRIP-ACNC: <2 MG/DL
WBC # BLD AUTO: 5.2 X10(3) UL (ref 4–11)
WBC # BLD AUTO: 5.2 X10ˆ3/UL (ref 4–11)

## 2025-05-03 PROCEDURE — 81002 URINALYSIS NONAUTO W/O SCOPE: CPT

## 2025-05-03 PROCEDURE — 85025 COMPLETE CBC W/AUTO DIFF WBC: CPT | Performed by: NURSE PRACTITIONER

## 2025-05-03 PROCEDURE — 81025 URINE PREGNANCY TEST: CPT

## 2025-05-03 PROCEDURE — 99213 OFFICE O/P EST LOW 20 MIN: CPT

## 2025-05-03 PROCEDURE — 36415 COLL VENOUS BLD VENIPUNCTURE: CPT

## 2025-05-03 PROCEDURE — 99214 OFFICE O/P EST MOD 30 MIN: CPT

## 2025-05-03 PROCEDURE — 80047 BASIC METABLC PNL IONIZED CA: CPT

## 2025-05-03 NOTE — ED PROVIDER NOTES
Patient Seen in: [facility]      History     Chief Complaint   Patient presents with    Muscle Pain     Stated Complaint: Nausea    Subjective:   HPI    39-year-old female presents with muscle aches chills urinary frequency nausea and some low back pain for 3 days.  She is afebrile in immediate care.  History of Present Illness               Objective:     Past Medical History:    Arrhythmia    HX OF PVCS    Decorative tattoo    H/O colonoscopy with polypectomy    History of esophagogastroduodenoscopy (EGD)    HPV in female    Thyroid cyst    goiter    Varicella    Had chicken pox during childhood              Past Surgical History:   Procedure Laterality Date    Colonoscopy N/A 3/5/2025    Procedure: COLONOSCOPY;  Surgeon: Se Bullock MD;  Location: [facility] ENDOSCOPY    Colposcopy, cervix w/upper adjacent vagina; w/biopsy(s), cervix      Leep                  Social History     Socioeconomic History    Marital status:    Tobacco Use    Smoking status: Former     Current packs/day: 0.00     Average packs/day: 0.5 packs/day for 15.0 years (7.5 ttl pk-yrs)     Types: Cigarettes     Start date: 3/17/2002     Quit date: 3/17/2017     Years since quittin.1     Passive exposure: Past    Smokeless tobacco: Never   Vaping Use    Vaping status: Never Used   Substance and Sexual Activity    Alcohol use: Not Currently     Comment: SOCIALLY    Drug use: No    Sexual activity: Yes     Partners: Male   Other Topics Concern    Caffeine Concern Yes     Comment: Coffee 1 cup daily    Pt has a pacemaker No    Pt has a defibrillator No    Breast feeding No    Reaction to local anesthetic No     Social Drivers of Health     Food Insecurity: No Food Insecurity (2023)    Food Insecurity     Food Insecurity: Never true   Transportation Needs: No Transportation Needs (2023)    Transportation Needs     Lack of Transportation: No   Housing Stability: Low Risk  (2023)    Housing Stability      Housing Instability: No              Review of Systems    Positive for stated complaint: Nausea  Other systems are as noted in HPI.  Constitutional and vital signs reviewed.      All other systems reviewed and negative except as noted above.                  Physical Exam     ED Triage Vitals [05/03/25 0952]   /81   Pulse 68   Resp 18   Temp 97.7 °F (36.5 °C)   Temp src Oral   SpO2 100 %   O2 Device None (Room air)       Current Vitals:   Vital Signs  BP: 134/81  Pulse: 68  Resp: 18  Temp: 97.7 °F (36.5 °C)  Temp src: Oral    Oxygen Therapy  SpO2: 100 %  O2 Device: None (Room air)        Physical Exam  Vitals reviewed.   Constitutional:       General: She is not in acute distress.     Appearance: She is not ill-appearing.   HENT:      Right Ear: Tympanic membrane, ear canal and external ear normal.      Left Ear: Tympanic membrane, ear canal and external ear normal.      Nose: Nose normal.      Mouth/Throat:      Mouth: Mucous membranes are moist.   Cardiovascular:      Rate and Rhythm: Normal rate and regular rhythm.   Pulmonary:      Effort: Pulmonary effort is normal.      Breath sounds: Normal breath sounds.   Musculoskeletal:         General: Normal range of motion.      Cervical back: Normal range of motion and neck supple.   Lymphadenopathy:      Cervical: No cervical adenopathy.   Skin:     General: Skin is warm and dry.   Neurological:      General: No focal deficit present.      Mental Status: She is alert and oriented to person, place, and time.   Psychiatric:         Mood and Affect: Mood normal.         Behavior: Behavior normal.           Physical Exam                ED Course     Labs Reviewed   POCT ISTAT CHEM8 CARTRIDGE - Abnormal; Notable for the following components:       Result Value    ISTAT Glucose 105 (*)     All other components within normal limits   POCT PREGNANCY URINE - Normal   CBC W AUTO DIFF   POCT CBC   EM[facility] POCT URINALYSIS DIPSTICK          Results                           MDM               Medical Decision Making  35-year-old female presents with low back pain, chills, muscle aches, urinary frequency, dry mouth and nausea x 3 days.  Differential diagnosis includes viral illness, new onset hyperglycemia/diabetes, thyroid disease, UTI.  Patient is afebrile.  She appears in no acute distress.  Urine specimen was collected and is negative for pregnancy and shows no UTI.  CBC and chemistry were done and all are within normal limits.  These results were discussed with patient.  She was given some reassurance that this is likely a viral illness but if symptoms persist she may want to follow-up with her primary care doctor for a more detailed evaluation.    Amount and/or Complexity of Data Reviewed  Labs: ordered.     Details: POC urine is negative  POC urine preg is negative  POC CBC is WNL  POC Chem is WNL    Risk  OTC drugs.        Disposition and Plan     Clinical Impression:  1. Viral illness         Disposition:  Discharge  5/3/2025 10:42 am    Follow-up:  No follow-up provider specified.        Medications Prescribed:  Discharge Medication List as of 5/3/2025 10:46 AM          Supplementary Documentation:

## 2025-05-03 NOTE — ED INITIAL ASSESSMENT (HPI)
Low back pain,generalized muscle pains, chills but no recorded fever, urinary frequency with no dysuria, dizziness, dry mouth, nausea  for 3 days

## 2025-05-05 ENCOUNTER — OFFICE VISIT (OUTPATIENT)
Dept: PHYSICAL THERAPY | Facility: HOSPITAL | Age: 40
End: 2025-05-05
Attending: INTERNAL MEDICINE
Payer: COMMERCIAL

## 2025-05-05 DIAGNOSIS — H81.13 BENIGN PAROXYSMAL POSITIONAL VERTIGO DUE TO BILATERAL VESTIBULAR DISORDER: Primary | ICD-10-CM

## 2025-05-05 PROCEDURE — 97112 NEUROMUSCULAR REEDUCATION: CPT

## 2025-05-05 PROCEDURE — 97161 PT EVAL LOW COMPLEX 20 MIN: CPT

## 2025-05-05 NOTE — PROGRESS NOTES
VESTIBULAR EVALUATION:     Diagnosis:   Benign paroxysmal positional vertigo due to bilateral vestibular disorder (H81.13) Patient:  Renetta Milian (39 year old, female)        Referring Provider: Mayuri Buck  Today's Date   5/5/2025    Precautions:  None   Date of Evaluation: 05/05/25  Next MD visit: N/A  Date of Surgery: N/A     PATIENT SUMMARY   Summary of chief complaints: dizziness  History of current condition: Dizziness started about a month ago. Was on vacation, spent the day on the boat and started feeling \"cely\" after. She figured it was related to being on a boat. The symptoms persisted on the plane ride home and after. Pt has had BPPV in the past and has history of vestibular migraines. No migraines recently. Has been congested form allergies as well.   Occupation: Works full time at home; usually sits on her couch with laptop   Leisure activities/Hobbies: Walking, Exercise classes, Yoga   Prior level of function: independent  Current limitations: rolling and turning her head right and walking when symptoms are present.  Pt goals: get rid of dizziness  Symptoms with cough/sneeze or loud noise: No  Falls: No      Hx of migraines: Yes was present in vestibular PT prior with vestibular migraine symptoms, more hormone related   Hx of vision issue: No    Hx of hearing issues: -- (fullness in the right ear (allergies))    Dizziness: Current: 0 /10, Best: 0 /10, Worst:5 /10  Quality: like she is still on the boat, feels off balance  Frequency/Duration: intermittent, come and go and on and off throughout the day; when present, is for a few minutes   Aggravates: rolling (turning her head to the right, rolling to the right; walking or just sitting main triggers)   Relieves: not moving     Dizziness Handicap Inventory: (Patient-Rptd) 12-Mild Handicap.    Past medical history was reviewed with Renetta.    Imaging/Tests: N/A   Renetta  has a past medical history of Arrhythmia, Decorative tattoo, H/O colonoscopy  with polypectomy (03/05/2025), History of esophagogastroduodenoscopy (EGD) (03/05/2025), HPV in female, Thyroid cyst, and Varicella.  She  has a past surgical history that includes leep (2015); colposcopy, cervix w/upper adjacent vagina; w/biopsy(s), cervix; and colonoscopy (N/A, 3/5/2025).    ASSESSMENT  Renetta presents to physical therapy evaluation with primary c/o dizziness. The results of the objective tests and measures show negative oculomotor exam and positional tests. Functional deficits include but are not limited to rolling and turning her head right and walking when symptoms are present.. Exam was negative and pt feels confident utilizing prior symptom management strategies from therapy before. Skilled therapy is not needed. DC today.      OBJECTIVE:    Musculoskeltal:  Posture/Observation: normal   Cervical ROM WNL in all directions  Adverse neuro signs with ROM: No    Neurological:  Neuro Screen: Sensation: WNL for light touch screen (newer onset of paresthesias in the hands (mostly in morning) and legs/buttock to hips and down (intermittent throughout the day); mostly down the right arm)    Coordination Testing:   Finger to Nose: NT   Pronation/supination: NT  Toe tapping:  decreased accuracy      Oculomotor & Vestibular Exam:  Extraocular Range of Motion: normal  Spontaneous Nystagmus:        room light: None         fixation blocked: None   Smooth Pursuit: normal  Saccades: normal   Gaze Evoked Nystagmus:        room light: normal       fixation blocked: normal   Head Impulse Test: normal bilaterally   VOR screen:  normal     VOR Cancellation: normal;    Convergence: normal;     Cover/Uncover: not tested   Cross Cover: not tested   Head Shaking Nystagmus: normal  Dynamic Visual Acuity: not tested    Positional Testing:  Irish Hallpike Right - negative, no symptoms Irish Hallpike Left - negative, no symptoms Deep Head Hang- negative, no symptoms Horizontal Roll Test-  negative, no symptoms     Today’s  Treatment and Response:   Pt education was provided on exam findings, treatment diagnosis, treatment plan, expectations, and prognosis.   Today's Treatment       5/5/2025   Vestibular Treatment   Neuro Re-Education Pt education, findings and encouraged grounding, body awareness, and core focused exercises.    Neuro Re-Educ Minutes 10   Evaluation Minutes 15   Total Time Of Timed Procedures 10   Total Time Of Service-Based Procedures 15   Total Treatment Time 25   HEP none      Patient was instructed in and issued a HEP for: none  Pt was also provided recommendations for: symptom management.    Charges:  PT EVAL: Low Complexity, 1 NMR  In agreement with evaluation findings and clinical rationale, this evaluation involved LOW COMPLEXITY decision making due to no personal factors/comorbidities, 1-2 body structures involved/activity limitations, and stable symptoms as documented in the evaluation.     PLAN OF CARE:    Eval and DC. No treatment needed.     Patient/Family/Caregiver was advised of these findings, precautions, and treatment options and has agreed to actively participate in planning and for this course of care.     Thank you for your referral. Please co-sign or sign and return this letter via fax as soon as possible to [phone]. If you have any questions, please contact me at Dept: [phone]    Sincerely,  Electronically signed by therapist: Henry Cuadra, PT  Physician's certification required: Yes  I certify the need for these services furnished under this plan of treatment and while under my care.    X___________________________________________________ Date____________________    Certification From: 5/5/2025  To:8/3/2025

## 2025-05-07 ENCOUNTER — HOSPITAL ENCOUNTER (OUTPATIENT)
Dept: GENERAL RADIOLOGY | Age: 40
Discharge: HOME | End: 2025-05-07
Attending: INTERNAL MEDICINE
Payer: COMMERCIAL

## 2025-05-07 ENCOUNTER — OFFICE VISIT (OUTPATIENT)
Dept: INTERNAL MEDICINE CLINIC | Facility: CLINIC | Age: 40
End: 2025-05-07
Payer: COMMERCIAL

## 2025-05-07 ENCOUNTER — APPOINTMENT (OUTPATIENT)
Dept: PHYSICAL THERAPY | Facility: HOSPITAL | Age: 40
End: 2025-05-07
Attending: INTERNAL MEDICINE
Payer: COMMERCIAL

## 2025-05-07 VITALS
TEMPERATURE: 98 F | OXYGEN SATURATION: 98 % | HEART RATE: 92 BPM | DIASTOLIC BLOOD PRESSURE: 78 MMHG | HEIGHT: 67 IN | WEIGHT: 218 LBS | BODY MASS INDEX: 34.21 KG/M2 | SYSTOLIC BLOOD PRESSURE: 116 MMHG

## 2025-05-07 DIAGNOSIS — M54.50 ACUTE BILATERAL LOW BACK PAIN WITHOUT SCIATICA: ICD-10-CM

## 2025-05-07 DIAGNOSIS — M54.50 ACUTE BILATERAL LOW BACK PAIN WITHOUT SCIATICA: Primary | ICD-10-CM

## 2025-05-07 PROCEDURE — 3008F BODY MASS INDEX DOCD: CPT | Performed by: INTERNAL MEDICINE

## 2025-05-07 PROCEDURE — 3074F SYST BP LT 130 MM HG: CPT | Performed by: INTERNAL MEDICINE

## 2025-05-07 PROCEDURE — 72100 X-RAY EXAM L-S SPINE 2/3 VWS: CPT | Performed by: INTERNAL MEDICINE

## 2025-05-07 PROCEDURE — 99213 OFFICE O/P EST LOW 20 MIN: CPT | Performed by: INTERNAL MEDICINE

## 2025-05-07 PROCEDURE — 3078F DIAST BP <80 MM HG: CPT | Performed by: INTERNAL MEDICINE

## 2025-05-07 NOTE — PROGRESS NOTES
Renetat Milian is a 39 year old female.  Chief Complaint   Patient presents with    Follow - Up     Patient seen at [facility] [place] on 05/03/25 for muscle pain/ nausea     HPI:   Patient presented today for follow-up.  She states that she has been having aches and pains throughout the body.  Patient states she feels like twitching in the muscles off and on. She states that she feels burning sensation in multiple muscles as well. She is able to walk and carry on her day to day activities without much complains.   Thursday had burning sensation on sides  Twitching all over   Increased urination. UA was negative on 5/3  Shakiness all over comes and goes  Hands swollen in morning and than slowly improve   Stomach pain also which has now improved  Not on her period  Blood work from [facility] reviewed and it was normal   Patient is waiting to see rheum in June for positive RF         Current Medications[1]   Past Medical History[2]   Past Surgical History[3]   Social History:  Short Social Hx on File[4]   Family History[5]   Allergies[6]     REVIEW OF SYSTEMS:   Review of Systems   Review of Systems   Constitutional: Negative for activity change, appetite change and fever.   HENT: Negative for congestion and voice change.    Respiratory: Negative for cough and shortness of breath.    Cardiovascular: Negative for chest pain.   Gastrointestinal: Negative for abdominal distention, abdominal pain and vomiting.   Genitourinary: Negative for hematuria.   Skin: Negative for wound.   Psychiatric/Behavioral: Negative for behavioral problems.   Wt Readings from Last 5 Encounters:   05/07/25 218 lb (98.9 kg)   03/14/25 216 lb (98 kg)   02/27/25 215 lb (97.5 kg)   11/27/24 217 lb (98.4 kg)   11/26/24 218 lb (98.9 kg)     Body mass index is 34.14 kg/m².      EXAM:   /78   Pulse 92   Temp 97.9 °F (36.6 °C) (Temporal)   Ht 5' 7\" (1.702 m)   Wt 218 lb (98.9 kg)   LMP 04/26/2025 (Approximate)   SpO2 98%   BMI  34.14 kg/m²   Physical Exam   Constitutional:       Appearance: Normal appearance.   HENT:      Head: Normocephalic.   Eyes:      Conjunctiva/sclera: Conjunctivae normal.   Breast:  Normal bilateral breast exam. No palpable masses or nodules.   No nipples asymmetry or discharge. No skin changes   Cardiovascular:      Rate and Rhythm: Normal rate and regular rhythm.      Heart sounds: Normal heart sounds. No murmur heard.  Pulmonary:      Effort: Pulmonary effort is normal.      Breath sounds: Normal breath sounds. No rhonchi or rales.   Abdominal:      General: Bowel sounds are normal.      Palpations: Abdomen is soft.      Tenderness: There is no abdominal tenderness.   Musculoskeletal:      Cervical back: Neck supple.      Right lower leg: No edema.      Left lower leg: No edema.   Skin:     General: Skin is warm and dry.   Neurological:      General: No focal deficit present.      Mental Status: He is alert and oriented to person, place, and time. Mental status is at baseline.   Psychiatric:         Mood and Affect: Mood normal.         Behavior: Behavior normal.       ASSESSMENT AND PLAN:     Assessment & Plan  Acute bilateral low back pain without sciatica  - check X ray of lumbar spine to r/o nerve impingement or arthritis in the back   - tylenol as needed  - establish with rheum   - will consider gabapentin if symptoms persists  Orders:    XR LUMBAR SPINE (MIN 2 VIEWS) (CPT=72100); Future             The patient indicates understanding of these issues and agrees to the plan.      Mayuri Buck MD     This note was created by Dragon voice recognition. Errors in content may be related to improper recognition by the system; efforts to review and correct have been done but errors may still exist. Please be advised the primary purpose of this note is for me to communicate medical care. Standard sentence structure is not always used. Medical terminology and medical abbreviations may be used. There may be  grammatical, typographical, and automated fill ins that may have errors missed in proofreading.          [1]   Current Outpatient Medications   Medication Sig Dispense Refill    Multiple Vitamins-Minerals (WOMENS MULTIVITAMIN) Oral Tab       Cholecalciferol (VITAMIN D) 1000 units Oral Tab Take 1,000 Units by mouth daily.        Omeprazole 40 MG Oral Capsule Delayed Release Take 1 capsule (40 mg total) by mouth every morning before breakfast. (Patient not taking: Reported on 5/7/2025) 90 capsule 0    acidophilus-pectin Oral Cap Take 1 capsule by mouth daily. (Patient not taking: Reported on 5/7/2025)      levocetirizine 5 MG Oral Tab Take 1 tablet (5 mg total) by mouth every evening. (Patient not taking: Reported on 3/14/2025) 90 tablet 1    azelastine 0.1 % Nasal Solution 2 sprays by Nasal route 2 (two) times daily. (Patient not taking: Reported on 5/7/2025) 30 mL 1    polyethylene glycol, PEG 3350-KCl-NaBcb-NaCl-NaSulf, 236 g Oral Recon Soln Take 4,000 mL by mouth As Directed. Take 2,000 mL the night before your procedure and 2,000 mL the morning of your procedure. (Patient not taking: Reported on 5/7/2025) 1 each 0   [2]   Past Medical History:   Arrhythmia    HX OF PVCS    Decorative tattoo    H/O colonoscopy with polypectomy    History of esophagogastroduodenoscopy (EGD)    HPV in female    Thyroid cyst    goiter    Varicella    Had chicken pox during childhood   [3]   Past Surgical History:  Procedure Laterality Date    Colonoscopy N/A 3/5/2025    Procedure: COLONOSCOPY;  Surgeon: Se Bullock MD;  Location: [facility] ENDOSCOPY    Colposcopy, cervix w/upper adjacent vagina; w/biopsy(s), cervix      Leep  2015   [4]   Social History  Socioeconomic History    Marital status:    Tobacco Use    Smoking status: Former     Current packs/day: 0.00     Average packs/day: 0.5 packs/day for 15.0 years (7.5 ttl pk-yrs)     Types: Cigarettes     Start date: 3/17/2002     Quit date: 3/17/2017     Years since  quittin.1     Passive exposure: Past    Smokeless tobacco: Never   Vaping Use    Vaping status: Never Used   Substance and Sexual Activity    Alcohol use: Not Currently     Comment: SOCIALLY    Drug use: No    Sexual activity: Yes     Partners: Male   Other Topics Concern    Caffeine Concern Yes     Comment: Coffee 1 cup daily    Pt has a pacemaker No    Pt has a defibrillator No    Breast feeding No    Reaction to local anesthetic No     Social Drivers of Health     Food Insecurity: No Food Insecurity (2023)    Food Insecurity     Food Insecurity: Never true   Transportation Needs: No Transportation Needs (2023)    Transportation Needs     Lack of Transportation: No   Stress: No Stress Concern Present (2023)    Stress     Feeling of Stress : No   Housing Stability: Low Risk  (2023)    Housing Stability     Housing Instability: No   [5]   Family History  Problem Relation Age of Onset    Other (alzheimers) Mother     Prostate Cancer Father     Colon Cancer Maternal Aunt     Breast Cancer Paternal Aunt 60        unsure of age   [6] No Known Allergies

## 2025-05-14 ENCOUNTER — APPOINTMENT (OUTPATIENT)
Dept: PHYSICAL THERAPY | Facility: HOSPITAL | Age: 40
End: 2025-05-14
Attending: INTERNAL MEDICINE
Payer: COMMERCIAL

## 2025-05-18 ENCOUNTER — PATIENT MESSAGE (OUTPATIENT)
Dept: DERMATOLOGY CLINIC | Facility: CLINIC | Age: 40
End: 2025-05-18

## 2025-05-22 ENCOUNTER — OFFICE VISIT (OUTPATIENT)
Dept: DERMATOLOGY CLINIC | Facility: CLINIC | Age: 40
End: 2025-05-22
Payer: COMMERCIAL

## 2025-05-22 ENCOUNTER — LAB ENCOUNTER (OUTPATIENT)
Dept: LAB | Age: 40
End: 2025-05-22
Attending: DERMATOLOGY
Payer: COMMERCIAL

## 2025-05-22 DIAGNOSIS — L82.1 SK (SEBORRHEIC KERATOSIS): ICD-10-CM

## 2025-05-22 DIAGNOSIS — D22.9 MULTIPLE BENIGN NEVI: ICD-10-CM

## 2025-05-22 DIAGNOSIS — D23.9 BENIGN NEOPLASM OF SKIN, UNSPECIFIED LOCATION: ICD-10-CM

## 2025-05-22 DIAGNOSIS — D18.01 HEMANGIOMA OF SKIN AND SUBCUTANEOUS TISSUE: ICD-10-CM

## 2025-05-22 DIAGNOSIS — R21 RASH AND OTHER NONSPECIFIC SKIN ERUPTION: ICD-10-CM

## 2025-05-22 DIAGNOSIS — Z87.2 HISTORY OF SUN-DAMAGED SKIN: ICD-10-CM

## 2025-05-22 DIAGNOSIS — L81.4 LENTIGO: ICD-10-CM

## 2025-05-22 DIAGNOSIS — R21 RASH AND OTHER NONSPECIFIC SKIN ERUPTION: Primary | ICD-10-CM

## 2025-05-22 PROCEDURE — 86038 ANTINUCLEAR ANTIBODIES: CPT

## 2025-05-22 PROCEDURE — 86235 NUCLEAR ANTIGEN ANTIBODY: CPT

## 2025-05-22 PROCEDURE — 36415 COLL VENOUS BLD VENIPUNCTURE: CPT

## 2025-05-22 RX ORDER — MULTIVITAMIN
TABLET ORAL
COMMUNITY
Start: 2025-01-16

## 2025-05-22 RX ORDER — TRIAMCINOLONE ACETONIDE 1 MG/G
1 CREAM TOPICAL 2 TIMES DAILY
Qty: 80 G | Refills: 1 | Status: SHIPPED | OUTPATIENT
Start: 2025-05-22

## 2025-05-22 NOTE — PROGRESS NOTES
The following individual(s) verbally consented to be recorded using ambient AI listening technology and understand that they can each withdraw their consent to this listening technology at any point by asking the clinician to turn off or pause the recording:    Patient name: Renetta Milian  Additional names:

## 2025-05-23 ENCOUNTER — PATIENT MESSAGE (OUTPATIENT)
Dept: DERMATOLOGY CLINIC | Facility: CLINIC | Age: 40
End: 2025-05-23

## 2025-05-23 LAB
ENA SS-A IGG SER IA-ACNC: 4 U/ML (ref ?–7)
ENA SS-B IGG SER IA-ACNC: <0.4 U/ML (ref ?–7)

## 2025-05-26 ENCOUNTER — HOSPITAL ENCOUNTER (EMERGENCY)
Facility: HOSPITAL | Age: 40
Discharge: HOME | End: 2025-05-26
Attending: EMERGENCY MEDICINE
Payer: COMMERCIAL

## 2025-05-26 ENCOUNTER — HOSPITAL ENCOUNTER (OUTPATIENT)
Age: 40
Discharge: ANOTHER HOSPITAL | End: 2025-05-26
Attending: STUDENT IN AN ORGANIZED HEALTH CARE EDUCATION/TRAINING PROGRAM
Payer: COMMERCIAL

## 2025-05-26 ENCOUNTER — APPOINTMENT (OUTPATIENT)
Dept: ULTRASOUND IMAGING | Facility: HOSPITAL | Age: 40
End: 2025-05-26
Attending: EMERGENCY MEDICINE
Payer: COMMERCIAL

## 2025-05-26 VITALS
SYSTOLIC BLOOD PRESSURE: 120 MMHG | BODY MASS INDEX: 33.74 KG/M2 | DIASTOLIC BLOOD PRESSURE: 76 MMHG | TEMPERATURE: 98 F | HEIGHT: 67 IN | OXYGEN SATURATION: 99 % | RESPIRATION RATE: 17 BRPM | WEIGHT: 215 LBS | HEART RATE: 77 BPM

## 2025-05-26 VITALS
RESPIRATION RATE: 19 BRPM | TEMPERATURE: 98 F | OXYGEN SATURATION: 98 % | SYSTOLIC BLOOD PRESSURE: 126 MMHG | HEART RATE: 79 BPM | DIASTOLIC BLOOD PRESSURE: 79 MMHG

## 2025-05-26 DIAGNOSIS — R21 SKIN RASH: ICD-10-CM

## 2025-05-26 DIAGNOSIS — R10.11 ABDOMINAL PAIN, RIGHT UPPER QUADRANT: Primary | ICD-10-CM

## 2025-05-26 DIAGNOSIS — R21 RASH: ICD-10-CM

## 2025-05-26 DIAGNOSIS — S29.012A RHOMBOID MUSCLE STRAIN, INITIAL ENCOUNTER: ICD-10-CM

## 2025-05-26 DIAGNOSIS — G89.29 CHRONIC LEFT SHOULDER PAIN: ICD-10-CM

## 2025-05-26 DIAGNOSIS — M25.512 CHRONIC LEFT SHOULDER PAIN: ICD-10-CM

## 2025-05-26 LAB
ALBUMIN SERPL-MCNC: 4.6 G/DL (ref 3.2–4.8)
ALBUMIN/GLOB SERPL: 1.6 {RATIO} (ref 1–2)
ALP LIVER SERPL-CCNC: 59 U/L (ref 37–98)
ALT SERPL-CCNC: 15 U/L (ref 10–49)
ANION GAP SERPL CALC-SCNC: 8 MMOL/L (ref 0–18)
AST SERPL-CCNC: 12 U/L (ref ?–34)
B-HCG UR QL: NEGATIVE
BASOPHILS # BLD AUTO: 0.05 X10(3) UL (ref 0–0.2)
BASOPHILS NFR BLD AUTO: 0.8 %
BILIRUB SERPL-MCNC: 0.7 MG/DL (ref 0.3–1.2)
BUN BLD-MCNC: 9 MG/DL (ref 9–23)
BUN/CREAT SERPL: 11.8 (ref 10–20)
CALCIUM BLD-MCNC: 9.2 MG/DL (ref 8.7–10.4)
CHLORIDE SERPL-SCNC: 107 MMOL/L (ref 98–112)
CO2 SERPL-SCNC: 27 MMOL/L (ref 21–32)
CREAT BLD-MCNC: 0.76 MG/DL (ref 0.55–1.02)
DEPRECATED RDW RBC AUTO: 41.2 FL (ref 35.1–46.3)
EGFRCR SERPLBLD CKD-EPI 2021: 102 ML/MIN/1.73M2 (ref 60–?)
EOSINOPHIL # BLD AUTO: 0.05 X10(3) UL (ref 0–0.7)
EOSINOPHIL NFR BLD AUTO: 0.8 %
ERYTHROCYTE [DISTWIDTH] IN BLOOD BY AUTOMATED COUNT: 12.6 % (ref 11–15)
GLOBULIN PLAS-MCNC: 2.8 G/DL (ref 2–3.5)
GLUCOSE BLD-MCNC: 100 MG/DL (ref 70–99)
HCT VFR BLD AUTO: 41.7 % (ref 35–48)
HGB BLD-MCNC: 14 G/DL (ref 12–16)
IMM GRANULOCYTES # BLD AUTO: 0.01 X10(3) UL (ref 0–1)
IMM GRANULOCYTES NFR BLD: 0.2 %
LIPASE SERPL-CCNC: 29 U/L (ref 12–53)
LYMPHOCYTES # BLD AUTO: 1.73 X10(3) UL (ref 1–4)
LYMPHOCYTES NFR BLD AUTO: 26.8 %
MCH RBC QN AUTO: 30.1 PG (ref 26–34)
MCHC RBC AUTO-ENTMCNC: 33.6 G/DL (ref 31–37)
MCV RBC AUTO: 89.7 FL (ref 80–100)
MONOCYTES # BLD AUTO: 0.43 X10(3) UL (ref 0.1–1)
MONOCYTES NFR BLD AUTO: 6.7 %
NEUTROPHILS # BLD AUTO: 4.18 X10 (3) UL (ref 1.5–7.7)
NEUTROPHILS # BLD AUTO: 4.18 X10(3) UL (ref 1.5–7.7)
NEUTROPHILS NFR BLD AUTO: 64.7 %
OSMOLALITY SERPL CALC.SUM OF ELEC: 293 MOSM/KG (ref 275–295)
PLATELET # BLD AUTO: 284 10(3)UL (ref 150–450)
POTASSIUM SERPL-SCNC: 4.8 MMOL/L (ref 3.5–5.1)
PROT SERPL-MCNC: 7.4 G/DL (ref 5.7–8.2)
RBC # BLD AUTO: 4.65 X10(6)UL (ref 3.8–5.3)
SODIUM SERPL-SCNC: 142 MMOL/L (ref 136–145)
WBC # BLD AUTO: 6.5 X10(3) UL (ref 4–11)

## 2025-05-26 PROCEDURE — 99284 EMERGENCY DEPT VISIT MOD MDM: CPT

## 2025-05-26 PROCEDURE — 36415 COLL VENOUS BLD VENIPUNCTURE: CPT

## 2025-05-26 PROCEDURE — 80053 COMPREHEN METABOLIC PANEL: CPT | Performed by: EMERGENCY MEDICINE

## 2025-05-26 PROCEDURE — 76705 ECHO EXAM OF ABDOMEN: CPT | Performed by: EMERGENCY MEDICINE

## 2025-05-26 PROCEDURE — 81025 URINE PREGNANCY TEST: CPT

## 2025-05-26 PROCEDURE — 85025 COMPLETE CBC W/AUTO DIFF WBC: CPT | Performed by: EMERGENCY MEDICINE

## 2025-05-26 PROCEDURE — 83690 ASSAY OF LIPASE: CPT | Performed by: EMERGENCY MEDICINE

## 2025-05-26 PROCEDURE — 99213 OFFICE O/P EST LOW 20 MIN: CPT

## 2025-05-26 PROCEDURE — 99285 EMERGENCY DEPT VISIT HI MDM: CPT

## 2025-05-26 NOTE — ED INITIAL ASSESSMENT (HPI)
Pt complaining of rash on arms starting last Sunday. Pt had been outside in the garden. Pt saw dermatologist this week who thinks it might be autoimmune related. Pt states she feels like the rash is getting worse. Pt states she will have random jolts of pain on various parts of her body that just last a second. No fever. No sick symptoms.

## 2025-05-26 NOTE — ED PROVIDER NOTES
Patient Seen in: [facility] [place]        History  Chief Complaint   Patient presents with    Rash Skin Problem     Stated Complaint: Abdominal Pain - Skin marks spreading and some aches and pains mainly abdominal    Subjective:   HPI    39-year-old female with no significant past medical history, who presents with multiple complaints, she notes 9 days of rash of the B/L dorsal forearms that she feels is spreading to the upper shoulders, has been assessed by her dermatologist, Dr. Hilton, 4 days ago, autoimmune testing is in the process of being performed and she has not yet started the triamcinolone cream. She denies any associated pruritus.  She also notes chronic intermittent left shoulder pain which radiates down the left upper extremity at times with associated numbness but no reported trauma for the last 5 years.  She notes that since this morning she has had constant right upper quadrant pain and involving the right lower ribs no trauma.  No reported vomiting.  Patient reports that she is leaving for [place] in the next few days.    Objective:     No pertinent past medical history.            No pertinent past surgical history.              No pertinent social history.            Review of Systems    Positive for stated complaint: Abdominal Pain - Skin marks spreading and some aches and pains mainly abdominal  Other systems are as noted in HPI.  Constitutional and vital signs reviewed.      All other systems reviewed and negative except as noted above.                  Physical Exam    ED Triage Vitals [05/26/25 1140]   /79   Pulse 79   Resp 19   Temp 98.2 °F (36.8 °C)   Temp src Oral   SpO2 98 %   O2 Device None (Room air)       Current Vitals:   Vital Signs  BP: 126/79  Pulse: 79  Resp: 19  Temp: 98.2 °F (36.8 °C)  Temp src: Oral    Oxygen Therapy  SpO2: 98 %  O2 Device: None (Room air)            Physical Exam    General: Awake, alert, comfortable on room air, in no distress, tolerating oral  secretions, interactive  Pulmonary: Lungs CTA B, no wheezing, no conversational dyspnea  Cardiac: +2 B/L regular radial pulses  GI: Abdomen soft, mild TTP of the right upper quadrant, no rebound, no guarding  Neuro: Intact sensation and motor function of B/L radial, median, and ulnar nerves, symmetrical facial expressions on gross observation  Musculoskeletal: 5/5 B/L UE  strength, negative B/L drop arm sign, with left-sided abduction reports clicking in the shoulder, no TTP of the left shoulder or left trapezius region, mild TTP over the right lower ribs  HEENT: No periorbital edema or erythema, nonerythematous and nonedematous B/L tonsils, no tonsillar exudates, no peritonsillar edema, uvula midline, tolerating oral secretions, normal speech, no submandibular edema  Skin: No appreciated rash of the back, small papules of the B/L dorsal forearms and lateral upper arms, no streaking erythema, no underlying fluctuance, no purulence, no confluent erythema or warmth, no skin changes of the abdomen or right lower ribs  Psych: Normal mood, normal affect    ED Course  No labs or imaging performed  MDM  Given patient has right upper quadrant abdominal pain and cannot rule out cholecystitis through the immediate care, I therefore did recommend assessment via higher level of care through the emergency, patient prefers the [facility] emergency department and will go straight there.      I do not appreciate any signs of cellulitis of the skin and no oral lesions, no sign of shingles, and I did recommend follow-up with her dermatologist.  No imaging was performed of the left shoulder as patient is going straight to the emergency department for reassessment and for further recommendations of RUQ pain to also be assessed for LT shoulder pain, no new trauma, neurovascular intact with no sign of compartment syndrome at this time, and no chest pain to suggest ACS at this time, symptoms have also been intermittent for five  years.      As discussed with the patient, securely via the EMR, I did message Dr. Hilton, her dermatologist regarding today's assessment for her rash.    Disposition and Plan     Clinical Impression:  1. Abdominal pain, right upper quadrant    2. Rash    3. Chronic left shoulder pain         Disposition:  Ic to ed  5/26/2025 12:18 pm    Follow-up:  No follow-up provider specified.        Medications Prescribed:  Current Discharge Medication List          None

## 2025-05-26 NOTE — PROGRESS NOTES
Renetta Milian is a 39 year old female.  HPI:     CC:    Chief Complaint   Patient presents with    Derm Problem     LOV 24  Pt presents for skin discoloration on R cheek, and \"little raised, red pimples\" on B/L forearms since Saturday.  Denies any change in laundry detergent or soap. But, admits to being out in garden.  No current treatment.         Allergies:  Patient has no known allergies.    HISTORY:    Past Medical History:    Arrhythmia    HX OF PVCS    Decorative tattoo    H/O colonoscopy with polypectomy    History of esophagogastroduodenoscopy (EGD)    HPV in female    Thyroid cyst    goiter    Varicella    Had chicken pox during childhood      Past Surgical History:   Procedure Laterality Date    Colonoscopy N/A 3/5/2025    Procedure: COLONOSCOPY;  Surgeon: Se Bullock MD;  Location: [facility] ENDOSCOPY    Colposcopy, cervix w/upper adjacent vagina; w/biopsy(s), cervix      Leep        Family History   Problem Relation Age of Onset    Other (alzheimers) Mother     Prostate Cancer Father     Colon Cancer Maternal Aunt     Breast Cancer Paternal Aunt 60        unsure of age      Social History     Socioeconomic History    Marital status:    Tobacco Use    Smoking status: Former     Current packs/day: 0.00     Average packs/day: 0.5 packs/day for 15.0 years (7.5 ttl pk-yrs)     Types: Cigarettes     Start date: 3/17/2002     Quit date: 3/17/2017     Years since quittin.1     Passive exposure: Past    Smokeless tobacco: Never   Vaping Use    Vaping status: Never Used   Substance and Sexual Activity    Alcohol use: Not Currently     Comment: SOCIALLY    Drug use: No    Sexual activity: Yes     Partners: Male   Other Topics Concern    Caffeine Concern Yes     Comment: Coffee 1 cup daily    Pt has a pacemaker No    Pt has a defibrillator No    Breast feeding No    Reaction to local anesthetic No     Social Drivers of Health     Food Insecurity: No Food Insecurity (2023)    Food  Insecurity     Food Insecurity: Never true   Transportation Needs: No Transportation Needs (5/28/2023)    Transportation Needs     Lack of Transportation: No   Stress: No Stress Concern Present (5/28/2023)    Stress     Feeling of Stress : No   Housing Stability: Low Risk  (5/28/2023)    Housing Stability     Housing Instability: No          Current Outpatient Medications   Medication Sig Dispense Refill    Multiple Vitamin (MULTI-VITAMIN) Oral Tab multivitamin tablet, [RxNorm: 0]      triamcinolone 0.1 % External Cream Apply 1 Application topically 2 (two) times daily. 80 g 1    Multiple Vitamins-Minerals (WOMENS MULTIVITAMIN) Oral Tab       Cholecalciferol (VITAMIN D) 1000 units Oral Tab Take 1,000 Units by mouth daily.        Omeprazole 40 MG Oral Capsule Delayed Release Take 1 capsule (40 mg total) by mouth every morning before breakfast. (Patient not taking: Reported on 5/7/2025) 90 capsule 0    acidophilus-pectin Oral Cap Take 1 capsule by mouth daily. (Patient not taking: Reported on 5/7/2025)      levocetirizine 5 MG Oral Tab Take 1 tablet (5 mg total) by mouth every evening. (Patient not taking: Reported on 3/14/2025) 90 tablet 1    azelastine 0.1 % Nasal Solution 2 sprays by Nasal route 2 (two) times daily. (Patient not taking: Reported on 5/7/2025) 30 mL 1    polyethylene glycol, PEG 3350-KCl-NaBcb-NaCl-NaSulf, 236 g Oral Recon Soln Take 4,000 mL by mouth As Directed. Take 2,000 mL the night before your procedure and 2,000 mL the morning of your procedure. (Patient not taking: Reported on 5/7/2025) 1 each 0     Allergies:   No Known Allergies    Past Medical History:    Arrhythmia    HX OF PVCS    Decorative tattoo    H/O colonoscopy with polypectomy    History of esophagogastroduodenoscopy (EGD)    HPV in female    Thyroid cyst    goiter    Varicella    Had chicken pox during childhood     Past Surgical History:   Procedure Laterality Date    Colonoscopy N/A 3/5/2025    Procedure: COLONOSCOPY;  Surgeon:  Se Bullock MD;  Location: [facility] ENDOSCOPY    Colposcopy, cervix w/upper adjacent vagina; w/biopsy(s), cervix      Leep       Social History     Socioeconomic History    Marital status:      Spouse name: Not on file    Number of children: Not on file    Years of education: Not on file    Highest education level: Not on file   Occupational History    Not on file   Tobacco Use    Smoking status: Former     Current packs/day: 0.00     Average packs/day: 0.5 packs/day for 15.0 years (7.5 ttl pk-yrs)     Types: Cigarettes     Start date: 3/17/2002     Quit date: 3/17/2017     Years since quittin.1     Passive exposure: Past    Smokeless tobacco: Never   Vaping Use    Vaping status: Never Used   Substance and Sexual Activity    Alcohol use: Not Currently     Comment: SOCIALLY    Drug use: No    Sexual activity: Yes     Partners: Male   Other Topics Concern     Service Not Asked    Blood Transfusions Not Asked    Caffeine Concern Yes     Comment: Coffee 1 cup daily    Occupational Exposure Not Asked    Hobby Hazards Not Asked    Sleep Concern Not Asked    Stress Concern Not Asked    Weight Concern Not Asked    Special Diet Not Asked    Back Care Not Asked    Exercise Not Asked    Bike Helmet Not Asked    Seat Belt Not Asked    Self-Exams Not Asked    Grew up on a farm Not Asked    History of tanning Not Asked    Outdoor occupation Not Asked    Pt has a pacemaker No    Pt has a defibrillator No    Breast feeding No    Reaction to local anesthetic No   Social History Narrative    Not on file     Social Drivers of Health     Food Insecurity: No Food Insecurity (2023)    Food Insecurity     Food Insecurity: Never true   Transportation Needs: No Transportation Needs (2023)    Transportation Needs     Lack of Transportation: No   Stress: No Stress Concern Present (2023)    Stress     Feeling of Stress : No   Housing Stability: Low Risk  (2023)    Housing Stability     Housing  Instability: No     Housing Instability Emergency: Not on file     Crib or Bassinette: Not on file     Family History   Problem Relation Age of Onset    Other (alzheimers) Mother     Prostate Cancer Father     Colon Cancer Maternal Aunt     Breast Cancer Paternal Aunt 60        unsure of age       There were no vitals filed for this visit.    HPI:  Chief Complaint   Patient presents with    Derm Problem     LOV 11/08/24  Pt presents for skin discoloration on R cheek, and \"little raised, red pimples\" on B/L forearms since Saturday.  Denies any change in laundry detergent or soap. But, admits to being out in garden.  No current treatment.       routine follow-up concerned above  Personal history of skin cancer: No  Personal history of AK's: No  Personal history of dysplastic nevi: No  Family history of skin cancer: No    History of Present Illness  Renetta Milian is a 39 year old female who presents with a rash on her forearms.    The rash on her forearms began on Saturday, appearing as a 'perfect little ring' with a normal center and a red, dry-looking outer edge. It is more prominent in sunlight but is not itchy. No other rashy areas have been noted on her body. The rash has not worsened since Sunday.    She mentions experiencing a possible breathing reaction to straw while gardening, though she is unsure if it is related to the rash. She has been experiencing premature ventricular contractions (PVCs) intermittently for about a year.    Recent laboratory tests were normal, except for an elevated rheumatoid arthritis factor. She is scheduled to see a rheumatologist next month for further evaluation of her symptoms.    No headaches, joint pain, or other systemic symptoms occur when exposed to the sun.      Patient presents with concerns above.    Patient has been in their usual state of health.     Past notes/ records and appropriate/relevant lab results including pathology and past body maps reviewed. Including  outside notes/ PCP notes as appropriate. Updated and new information noted in current visit.     ROS:  Denies other relevant systemic complaints.      History, medications, allergies reviewed as noted.       Physical Examination:     Well-developed well-nourished patient alert oriented in no acute distress.  Exam performed, including scalp, head, neck, face,nails, hair, external eyes, including conjunctival mucosa, eyelids, lips external ears , arms, digits,palms.     Multiple light to medium brown, well marginated, uniformly pigmented, macules and papules 6 mm and less scattered on exam. pigmented lesions examined with dermoscopy benign-appearing patterns.     Waxy tannish keratotic papules scattered, cherry-red vascular papules scattered.    See map today's date for lesions noted .  See assessment and plan below for specific lesions.    Otherwise remarkable for lesions as noted on map.    See A/P  below for additional information:    Assessment / plan:    Orders Placed This Encounter   Procedures    Anti-Nuclear Antibody (MILAGROS) by IFA, Reflex Titer + Specific Antibodies    Sjogren's AB, Anti-SS-A/-SS-B       Meds & Refills for this Visit:  Requested Prescriptions     Signed Prescriptions Disp Refills    triamcinolone 0.1 % External Cream 80 g 1     Sig: Apply 1 Application topically 2 (two) times daily.         Encounter Diagnoses   Name Primary?    Rash and other nonspecific skin eruption Yes    SK (seborrheic keratosis)     Lentigo     Benign neoplasm of skin, unspecified location     Multiple benign nevi     Hemangioma of skin and subcutaneous tissue     History of sun-damaged skin              Physical Exam        Results  LABS  Rheumatoid factor: elevated  C-reactive protein (CRP): elevated  Antinuclear antibody (MILAGROS): negative    Assessment & Plan  Differential diagnosis of rash includes SCLE, lichenoid keratoses,  The rash on the forearms, characterized by a red, dry edge and normal center, is suggestive of  SCLE, especially given its prominence in sunlight. An elevated rheumatoid factor, may be associated with autoantibodies. Further antibody testing is planned to confirm the diagnosis. The rash does not currently warrant a biopsy as it is fading, but this may be reconsidered if the rash persists or recurs.  - Order Sjogren's antibodies test  - Advise use of sunscreen to prevent further sun-induced rashes  - Consider biopsy if rash persists or recurs    Possible connective tissue disease  Symptoms suggestive of a connective tissue disease, including a rash on the forearms that appears as a perfect ring with a red, dry edge and normal center, more prominent in sunlight. She has an elevated rheumatoid factor and is experiencing other systemic symptoms. Differential diagnosis includes SCLE and other connective tissue disorders. Recent labs have been normal, but further testing is warranted to explore the possibility of connective tissue disease. The connective tissue screen done by primary care may not be as comprehensive as those performed by a rheumatologist.  - Order Sjogren's antibodies test  - Advise use of sunscreen to prevent sun-induced rash    Elevated rheumatoid factor  Elevated rheumatoid factor could indicate an underlying autoimmune condition. She has been experiencing various symptoms, including a rash on the forearms more prominent in sunlight. The elevated rheumatoid factor may contribute to increased sun sensitivity. She is scheduled to see a rheumatologist next month for further evaluation. The possibility of autoimmune conditions such as Sjogren's syndrome is considered, as these can cause sun sensitivity and other systemic symptoms.  - Order Sjogren's antibodies test  - Advise use of sunscreen to prevent sun-induced rash  Follow-up if needed, symptomatic    Rash  Patient's photos lesions resolved at visit    New areas of acne on face forehead, jaw line  Acne. See medications in grid.  Skin care regimen  discussed at length including cleansers, makeup, face washing, sunscreen.  Recheck in 6 -8 weeks if no improvement.  Notify us promptly if problems tolerating regimen.  Consider more aggressive therapy if not responding.  Tretinoin nightly increasing to nightly as tolerated  Retinoids: Application instructions reviewed gentle cleanser, over moisturizer.  Hyaluronic acid continue moisturizers may be helpful at reducing irritation.  Start 2-3 times weekly slowly titrate up, increase to nightly as tolerated May need to decrease use with cold weather.    Multiple benign keratoses fibrous papules, benign nevi reassurance  Lesion left breast waxy tan papule consistent benign seborrheic keratosis,     small cyst at inframammary crease, monitor presently consider referral for excision if cyst size become more bothersome   scattered nevi over the back, benign-appearing  Mild parietal scalp intradermal nevus no atypia 8/22    Numerous benign-appearing nevi  No atypical appearing lesions.  Scattered nevi.    Waxy tan papule right periauricular cheek observe.  Papule left jawline observe.  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  Moderate sun damage.  Continue sunscreen sun protection    Please refer to map for specific lesions.  See additional diagnoses.  Pros cons of various therapies, risks benefits discussed.Pathophysiology discussed with patient.  Therapeutic options reviewed.  See  Medications in grid.  Instructions reviewed at length.    Benign nevi, seborrheic  keratoses, cherry angiomas:  Reassurance regarding other benign skin lesions.    Monitor for new or changing lesions. Signs and symptoms of skin cancer, ABCDE's of melanoma ( additional information available at AAD.org, skincancer.org) Encourage Sunscreen (broad-spectrum, ideally mineral-based-UVA/UVB -SPF 30 or higher) use encouraged, sun protection/sun protective clothing, self exams  reviewed Followup as noted RTC ---routine checkup 6 mos -one year or p.r.n.    Encounter Times   Including precharting, reviewing chart, prior notes obtaining history: 10 minutes, medical exam :10 minutes, notes on body map, plan, counseling 10minutes My total time spent caring for the patient on the day of the encounter: 30 minutes     The patient indicates understanding of these issues and agrees to the plan.  The patient is asked to return as noted in follow-up/ above.    This note was generated using Dragon voice recognition software.  Please contact me regarding any confusion resulting from errors in recognition..  Note to patient and family: The 21st Century Cures Act makes medical notes like these available to patients. However, be advised this is a medical document. It is intended as [id number] communication and monitoring of a patient's care needs. It is written in medical language and may contain abbreviations or verbiage that are unfamiliar. It may appear blunt or direct. Medical documents are intended to carry relevant information, facts as evident and the clinical opinion of the practitioner.

## 2025-05-26 NOTE — ED INITIAL ASSESSMENT (HPI)
Pt came in for RUQ abdominal pain this  morning.  Rash to bilateral arm since last Saturday.  Denies fever.  Denies nausea and vomiting.  Pt is A/OX 4, breathing unlabored.  History of endoscopy March 2025.

## 2025-05-26 NOTE — ED QUICK NOTES
Discharge instructions given to pt. Pt verbalized understanding of home care, and to follow up with PCP. Pt denied further questions or concerns. Pt ambulatory out of ED, discharged in stable condition.

## 2025-05-27 LAB — NUCLEAR IGG TITR SER IF: NEGATIVE {TITER}

## 2025-05-27 NOTE — ED PROVIDER NOTES
Patient Seen in: [facility] Emergency Department    History     Chief Complaint   Patient presents with    Abdominal Pain       HPI    Patient presents to the ED complaining of pain in her right upper quadrant starting this morning.  She also complains of pain in her left back that is worse if she moves, Also complains of a skin rash.  Rash started after gardening.  Denies other complaints.    History reviewed. Past Medical History[1]    History reviewed. Past Surgical History[2]      Medications :  Prescriptions Prior to Admission[3]     Family History[4]    Smoking Status: Social Hx on file[5]    Constitutional and vital signs reviewed.      Social History and Family History elements reviewed from today, pertinent positives to the presenting problem noted.    Physical Exam     ED Triage Vitals [05/26/25 1242]   /87   Pulse 76   Resp 20   Temp 98 °F (36.7 °C)   Temp src Oral   SpO2 98 %   O2 Device None (Room air)       All measures to prevent infection transmission during my interaction with the patient were taken. Handwashing was performed prior to and after the exam.  Stethoscope and any equipment used during my examination was cleaned with super sani-cloth germicidal wipes following the exam.     Physical Exam  Vitals and nursing note reviewed.   Constitutional:       General: She is not in acute distress.     Appearance: She is obese. She is not ill-appearing or toxic-appearing.   HENT:      Head: Normocephalic and atraumatic.   Eyes:      General:         Right eye: No discharge.         Left eye: No discharge.      Conjunctiva/sclera: Conjunctivae normal.   Neck:      Trachea: No tracheal deviation.   Cardiovascular:      Rate and Rhythm: Normal rate.   Pulmonary:      Effort: Pulmonary effort is normal. No respiratory distress.      Breath sounds: No stridor.   Abdominal:      General: There is no distension.      Palpations: Abdomen is soft.      Tenderness: There is abdominal tenderness in  the right upper quadrant. There is no guarding or rebound.   Musculoskeletal:         General: No deformity.      Comments: Point tenderness to the left rhomboid muscles and left lower cervical paraspinal muscles.   Skin:     General: Skin is warm and dry.      Findings: Rash present.      Comments: Mild punctate erythematous skin rash to bilateral exterior upper arms.   Neurological:      Mental Status: She is alert and oriented to person, place, and time.   Psychiatric:         Mood and Affect: Mood normal.         Behavior: Behavior normal.         ED Course        Labs Reviewed   COMP METABOLIC PANEL (14) - Abnormal; Notable for the following components:       Result Value    Glucose 100 (*)     All other components within normal limits   LIPASE - Normal   POCT PREGNANCY URINE - Normal   CBC WITH DIFFERENTIAL WITH PLATELET   RAINBOW DRAW LAVENDER   RAINBOW DRAW LIGHT GREEN   RAINBOW DRAW BLUE       As Interpreted by me    Imaging Results Available and Reviewed while in ED: US GALLBLADDER (CPT=76705)  Result Date: 5/26/2025  CONCLUSION: Normal examination.     Dictated by (CST): Joon Castro MD on 5/26/2025 at 3:37 PM     Finalized by (CST): Joon Castro MD on 5/26/2025 at 3:38 PM          ED Medications Administered: Medications - No data to display      MDM     Vitals:    05/26/25 1242 05/26/25 1415 05/26/25 1545   BP: 119/87 113/68 120/76   Pulse: 76 58 77   Resp: 20  17   Temp: 98 °F (36.7 °C)     TempSrc: Oral     SpO2: 98% 99% 99%   Weight: 97.5 kg     Height: 170.2 cm (5' 7\")       *I personally reviewed and interpreted all ED vitals.    Pulse Ox: 99%, Room air, Normal     Monitor Interpretation:   normal sinus rhythm as interpreted by me.  The cardiac monitor was ordered to monitor heart rate.    Differential Diagnosis/ Diagnostic Considerations: Biliary colic, choledocholithiasis, cholecystitis, nonspecific skin rash, rhomboid strain, other    Complicating Factors: The patient already has does not  have any pertinent problems on file. to contribute to the complexity of this ED evaluation.    Medical Decision Making  Patient presents to the ED with multiple symptoms.  Nonspecific skin rash, no concern for infection or concerning cause.  Right upper quadrant pain but normal laboratory testing and normal gallbladder ultrasound.  Benign abdomen on exam.  Left upper back pain consistent with muscle injury.  Patient reassured, stable for discharge and return precautions discussed.    Problems Addressed:  Abdominal pain, right upper quadrant: acute illness or injury that poses a threat to life or bodily functions  Rhomboid muscle strain, initial encounter: acute illness or injury    Amount and/or Complexity of Data Reviewed  Labs: ordered. Decision-making details documented in ED Course.  Radiology: ordered and independent interpretation performed. Decision-making details documented in ED Course.     Details: I personally viewed the patient's gallbladder ultrasound images and noted no gallstones        Condition upon leaving the department: Stable    Disposition and Plan     Clinical Impression:  1. Abdominal pain, right upper quadrant    2. Skin rash    3. Rhomboid muscle strain, initial encounter        Disposition:  Discharge    Follow-up:  Mayuri Buck MD  [address] [place] [address]  [phone]    Schedule an appointment as soon as possible for a visit in 3 day(s)        Medications Prescribed:  Discharge Medication List as of 5/26/2025  3:44 PM                           [1]   Past Medical History:   Arrhythmia    HX OF PVCS    Decorative tattoo    H/O colonoscopy with polypectomy    History of esophagogastroduodenoscopy (EGD)    HPV in female    Thyroid cyst    goiter    Varicella    Had chicken pox during childhood   [2]   Past Surgical History:  Procedure Laterality Date    Colonoscopy N/A 3/5/2025    Procedure: COLONOSCOPY;  Surgeon: Se Bullock MD;  Location: [facility] ENDOSCOPY     Colposcopy, cervix w/upper adjacent vagina; w/biopsy(s), cervix      Leep  2015   [3] (Not in a hospital admission)  [4]   Family History  Problem Relation Age of Onset    Other (alzheimers) Mother     Prostate Cancer Father     Colon Cancer Maternal Aunt     Breast Cancer Paternal Aunt 60        unsure of age   [5]   Social History  Socioeconomic History    Marital status:    Tobacco Use    Smoking status: Former     Current packs/day: 0.00     Average packs/day: 0.5 packs/day for 15.0 years (7.5 ttl pk-yrs)     Types: Cigarettes     Start date: 3/17/2002     Quit date: 3/17/2017     Years since quittin.1     Passive exposure: Past    Smokeless tobacco: Never   Vaping Use    Vaping status: Never Used   Substance and Sexual Activity    Alcohol use: Not Currently     Comment: SOCIALLY    Drug use: No    Sexual activity: Yes     Partners: Male   Other Topics Concern    Caffeine Concern Yes     Comment: Coffee 1 cup daily    Pt has a pacemaker No    Pt has a defibrillator No    Breast feeding No    Reaction to local anesthetic No

## 2025-05-27 NOTE — TELEPHONE ENCOUNTER
Please see message sent to patient.     Please check with the lab. Are they running the MILAGROS? I did order the SSA/B separately as this can be + in a negative MILAGROS, but I do need both orders run including  the MILAGROS IFA. Thank you

## 2025-06-14 NOTE — PROGRESS NOTES
RHEUMATOLOGY CLINIC- NEW PATIENT    Renetta Milian is a 39 year old female.    ASSESSMENT/PLAN:       ICD-10-CM    1. +RF  R79.9 Cyclic Citrullinate Pep. IGG     XR HAND (MIN 3 VIEWS), LEFT (CPT=73130)     XR HAND (MIN 3 VIEWS), RIGHT (CPT=73130)      2. Polyarthralgia  M25.50 Cyclic Citrullinate Pep. IGG     XR HAND (MIN 3 VIEWS), LEFT (CPT=73130)     XR HAND (MIN 3 VIEWS), RIGHT (CPT=73130)        DISCUSSION:  Patient presents as new outpatient referral for evaluation of multiple symptoms including: Polyarthralgias, fatigue, shortness of breath, weight gain, dry mouth, throat tightness, muscle/eye twitching in the setting of low positive RF.  No synovitis on current exam typically expected an underlying inflammatory arthritis such as rheumatoid arthritis and she rates her pain currently as 1/10.  Discussed with patient that I think be reasonable for now to monitor symptoms and check additional workup, as above.    PLAN:  -PRN Celebrex 100 mg twice daily.  Discussed with patient to avoid other OTC NSAIDs while on Celebrex given increased risk of side effects including GI upset and GI bleed.  -RF positive.  Will check CCP  - Will check x-rays for independent review.  If unremarkable, would proceed further with ultrasound of the hands.  - Consult/evaluation communicated with referring physician/provider.    I will MyChart patient on receipt of above.  If unremarkable, would proceed further with hand ultrasound.  If also normal, would monitor symptoms and consider once yearly follow-ups in the wintertime.    Tamara Reddy DO  6/17/2025   10:40 AM      HPI:     6/17/2025 Initial Consult: I had the pleasure of seeing Renetta Milian on 6/17/2025 as a new outpatient consultation for polyarthralgias with positive RF. The patient was originally referred by her PCP.       39 year old female w/ PMH thyroid cyst, GERD who presents to clinic today.  Patient noting multiple symptoms including polyarthralgias, fatigue,  shortness of breath, weight gain, dry mouth, throat tightness, muscle twitching, eye twitching.  Regarding her arthralgias, notes that her hands and feet are particularly worse, described as a \"aching \"pain.  Feet can hurt along her toes or the edges of her feet when walking.  Working out seems to aggravate her symptoms as well as needing can make her hand pain worse.  Does note some hand swelling.  Her pain can also act up when she is resting for a while and gets up-notes 30 minutes of morning stiffness.  Currently rates her joint pain as 1/10.  Also has history of 1 episode of scaly rash along her arms after gardening. No family history of autoimmune disease such as gout, lupus, rheumatoid arthritis, psoriatic arthritis.  ROS otherwise negative for unexplained fever, chills, photosensitive rash, unintentional weight loss, Raynaud's phenomenon, serositis, uveitis/iritis.    Current Medications:  N/A    Medication History:  N/A    Interval History:   See Above    HISTORY:  Past Medical History[1]   Social Hx Reviewed   Family Hx Reviewed     Medications (Active prior to today's visit):  Current Medications[2]    Allergies:  Allergies[3]      ROS:   Review of Systems   Constitutional:  Positive for unexpected weight change (weight gain). Negative for chills and fever.   HENT:  Negative for congestion, hearing loss, mouth sores, nosebleeds and trouble swallowing.         Dry mouth   Eyes:  Negative for photophobia, pain, redness and visual disturbance.   Respiratory:  Negative for cough and shortness of breath.    Cardiovascular:  Negative for chest pain, palpitations and leg swelling.   Gastrointestinal:  Negative for abdominal pain, blood in stool, diarrhea and nausea.   Endocrine: Negative for cold intolerance and heat intolerance.   Genitourinary:  Negative for dysuria, frequency and hematuria.   Musculoskeletal:  Positive for arthralgias, joint swelling and myalgias. Negative for back pain, gait problem, neck  pain and neck stiffness.   Skin:  Negative for color change and rash.   Neurological:  Negative for dizziness, weakness, numbness and headaches.   Psychiatric/Behavioral:  Negative for confusion and dysphoric mood.         PHYSICAL EXAM:     Constitutional:  Well developed, Well nourished, No acute distress  HENT:  Normocephalic, Atraumatic, Bilateral external ears normal, Oropharynx moist, No oral exudates.  Neck: Normal range of motion, No tenderness, Supple, No stridor.  Eyes:  PERRL, EOMI, Conjunctiva normal, No discharge.  Respiratory:  Normal breath sounds, No respiratory distress, No wheezing.  Cardiovascular:  Normal heart rate, Normal rhythm, No murmurs, No rubs, No gallops.  GI:  Bowel sounds normal, Soft, No tenderness, No masses, No pulsatile masses.  : No CVA tenderness.  Musculoskeletal:  A comprehensive 28 count joint exam was done and was negative for swelling or tenderness except as noted. Inspections for misalignment, asymmetry, crepitation, defects, tenderness, masses, nodules, effusions, range of motion, and stability in the upper and lower extremities bilaterally are all normal unless noted.           Integument:  Warm, Dry, No erythema, No rash.  Lymphatic:  No lymphadenopathy noted.  Neurologic:  Alert & oriented x 3, Normal motor function, Normal sensory function, No focal deficits noted.  Psychiatric:  Affect normal, Judgment normal, Mood normal.    LABS:     Prior lab work reviewed and notable for:     2025:  CBC without cytopenias or leukocytosis  CMP with BUN 9, CR 0.76 WNL, LFTs not elevated, no gamma gap    MILAGROS IFA negative  SSA/SSB negative    3/14/2025:  MILAGROS CRISS negative including dsDNA  CRP less than 0.4 WNL  C3 144.1 WNL, C4 23 WNL  RF 23.3 (slightly elevated)    TSH 0.854 WNL    3/9/2021:  CK 69 WNL    Imagin/7/24 Lumbar XR:     FINDINGS:      ALIGNMENT: No significant abnormality   VERTEBRAL BODIES:   Unremarkable.   DISC SPACES: Unremarkable   SACROILIAC JOINTS:  Unremarkable      Impression:   1. No fracture malalignment     6/20/24 R Thumb XR  FINDINGS:  BONES: No acute fracture or dislocation is evident. No suspicious osseous lesions are seen. Mild 1st carpometacarpal joint space narrowing and marginal osteophyte formation is perceived.  SOFT TISSUES: Circumferential soft tissue swelling is apparent. Negative for discernible radiopaque foreign body.  EFFUSION: None visible.                    Impression   CONCLUSION:  1. Soft tissue swelling of the right thumb without radiographic evidence of underlying osseous injury.     2. Mild primary osteoarthritic changes of the right 1st carpometacarpal joint.     2/21/22 Bilateral Hip XR:   FINDINGS:  BONES: No significant arthropathy, fracture or acute abnormality.  SOFT TISSUES: Negative. No visible soft tissue swelling.  EFFUSION: None visible.  OTHER: Deep pelvic calcifications likely represent phleboliths.                 Impression   CONCLUSION:     Unremarkable pelvic and right/left hip radiographs.       9/5/20 Cerv MRI:   Impression   CONCLUSION:  1. Normal cervical cord.  2. C5-6:  Disc degeneration/spondylosis.  Mild asymmetric central narrowing accentuated towards the right.          [1]   Past Medical History:   Arrhythmia    HX OF PVCS    Decorative tattoo    H/O colonoscopy with polypectomy    History of esophagogastroduodenoscopy (EGD)    HPV in female    Thyroid cyst    goiter    Varicella    Had chicken pox during childhood   [2]   Current Outpatient Medications   Medication Sig Dispense Refill    Multiple Vitamin (MULTI-VITAMIN) Oral Tab multivitamin tablet, [RxNorm: 0]      triamcinolone 0.1 % External Cream Apply 1 Application topically 2 (two) times daily. 80 g 1    acidophilus-pectin Oral Cap Take 1 capsule by mouth daily. (Patient not taking: Reported on 5/7/2025)      levocetirizine 5 MG Oral Tab Take 1 tablet (5 mg total) by mouth every evening. (Patient not taking: Reported on 3/14/2025) 90 tablet 1     azelastine 0.1 % Nasal Solution 2 sprays by Nasal route 2 (two) times daily. (Patient not taking: Reported on 5/7/2025) 30 mL 1    polyethylene glycol, PEG 3350-KCl-NaBcb-NaCl-NaSulf, 236 g Oral Recon Soln Take 4,000 mL by mouth As Directed. Take 2,000 mL the night before your procedure and 2,000 mL the morning of your procedure. (Patient not taking: Reported on 5/7/2025) 1 each 0    Multiple Vitamins-Minerals (WOMENS MULTIVITAMIN) Oral Tab       Cholecalciferol (VITAMIN D) 1000 units Oral Tab Take 1,000 Units by mouth daily.       [3] No Known Allergies

## 2025-06-17 ENCOUNTER — HOSPITAL ENCOUNTER (OUTPATIENT)
Dept: GENERAL RADIOLOGY | Age: 40
Discharge: HOME | End: 2025-06-17
Attending: INTERNAL MEDICINE
Payer: COMMERCIAL

## 2025-06-17 ENCOUNTER — LAB ENCOUNTER (OUTPATIENT)
Dept: LAB | Age: 40
End: 2025-06-17
Attending: INTERNAL MEDICINE
Payer: COMMERCIAL

## 2025-06-17 ENCOUNTER — OFFICE VISIT (OUTPATIENT)
Dept: RHEUMATOLOGY | Facility: CLINIC | Age: 40
End: 2025-06-17
Payer: COMMERCIAL

## 2025-06-17 VITALS
SYSTOLIC BLOOD PRESSURE: 120 MMHG | DIASTOLIC BLOOD PRESSURE: 82 MMHG | HEIGHT: 67 IN | HEART RATE: 81 BPM | RESPIRATION RATE: 16 BRPM | BODY MASS INDEX: 34.21 KG/M2 | OXYGEN SATURATION: 97 % | WEIGHT: 218 LBS

## 2025-06-17 DIAGNOSIS — M25.50 POLYARTHRALGIA: ICD-10-CM

## 2025-06-17 DIAGNOSIS — R79.9 ABNORMAL BLOOD CHEMISTRY TEST: ICD-10-CM

## 2025-06-17 DIAGNOSIS — R79.9 ABNORMAL BLOOD CHEMISTRY TEST: Primary | ICD-10-CM

## 2025-06-17 PROCEDURE — 3008F BODY MASS INDEX DOCD: CPT | Performed by: INTERNAL MEDICINE

## 2025-06-17 PROCEDURE — 3079F DIAST BP 80-89 MM HG: CPT | Performed by: INTERNAL MEDICINE

## 2025-06-17 PROCEDURE — 3074F SYST BP LT 130 MM HG: CPT | Performed by: INTERNAL MEDICINE

## 2025-06-17 PROCEDURE — 86200 CCP ANTIBODY: CPT | Performed by: INTERNAL MEDICINE

## 2025-06-17 PROCEDURE — 73130 X-RAY EXAM OF HAND: CPT | Performed by: INTERNAL MEDICINE

## 2025-06-17 PROCEDURE — 36415 COLL VENOUS BLD VENIPUNCTURE: CPT | Performed by: INTERNAL MEDICINE

## 2025-06-17 PROCEDURE — 99214 OFFICE O/P EST MOD 30 MIN: CPT | Performed by: INTERNAL MEDICINE

## 2025-06-17 RX ORDER — CELECOXIB 100 MG/1
CAPSULE ORAL
Qty: 60 CAPSULE | Refills: 2 | Status: SHIPPED | OUTPATIENT
Start: 2025-06-17

## 2025-06-19 LAB — CCP IGG SERPL-ACNC: 1.1 U/ML (ref 0–6.9)

## 2025-07-01 ENCOUNTER — PATIENT MESSAGE (OUTPATIENT)
Dept: DERMATOLOGY CLINIC | Facility: CLINIC | Age: 40
End: 2025-07-01

## 2025-07-01 NOTE — TELEPHONE ENCOUNTER
Okay to use CeraVe along with the triamcinolone we will try this first.  Rheumatology notes reviewed.  Positive rheumatoid factor other antibodies negative patient with multiple systemic symptoms noted previously.  Okay to come in for biopsy if no improvement over the next week with the triamcinolone

## 2025-07-01 NOTE — TELEPHONE ENCOUNTER
LOV 5/22/2025, please see msg/photos. I advised she try TAC (she picked it up but hasn't used it) and F/U for poss bx if rash persists as per LOV note.     Ok for cerave products or would you like a diff brand?

## 2025-07-16 ENCOUNTER — OFFICE VISIT (OUTPATIENT)
Dept: DERMATOLOGY CLINIC | Facility: CLINIC | Age: 40
End: 2025-07-16
Payer: COMMERCIAL

## 2025-07-16 DIAGNOSIS — R21 RASH AND OTHER NONSPECIFIC SKIN ERUPTION: ICD-10-CM

## 2025-07-16 DIAGNOSIS — D48.5 NEOPLASM OF UNCERTAIN BEHAVIOR OF SKIN: Primary | ICD-10-CM

## 2025-07-16 PROCEDURE — 88305 TISSUE EXAM BY PATHOLOGIST: CPT | Performed by: DERMATOLOGY

## 2025-07-16 PROCEDURE — 11104 PUNCH BX SKIN SINGLE LESION: CPT | Performed by: DERMATOLOGY

## 2025-07-16 PROCEDURE — 99213 OFFICE O/P EST LOW 20 MIN: CPT | Performed by: DERMATOLOGY

## 2025-07-16 NOTE — PROGRESS NOTES
The following individual(s) verbally consented to be recorded using ambient AI listening technology and understand that they can each withdraw their consent to this listening technology at any point by asking the clinician to turn off or pause the recording:    Patient name: Renetta Milian

## 2025-07-21 NOTE — PROGRESS NOTES
Renetta Milian is a 39 year old female.  HPI:     CC:    Chief Complaint   Patient presents with    Skin Biopsy     LOV 2025. Pt present for f/u on rash to b/l arms that has not gotten away. Notes biopsy was pended if rash did not improve. She has continued use of triamcinolone sparingly.          Allergies:  Patient has no known allergies.    HISTORY:    Past Medical History:    Arrhythmia    HX OF PVCS    Decorative tattoo    H/O colonoscopy with polypectomy    History of esophagogastroduodenoscopy (EGD)    HPV in female    Thyroid cyst    goiter    Varicella    Had chicken pox during childhood      Past Surgical History:   Procedure Laterality Date    Colonoscopy N/A 3/5/2025    Procedure: COLONOSCOPY;  Surgeon: Se Bullock MD;  Location: [facility] ENDOSCOPY    Colposcopy, cervix w/upper adjacent vagina; w/biopsy(s), cervix      Leep        Family History   Problem Relation Age of Onset    Other (alzheimers) Mother     Prostate Cancer Father     Colon Cancer Maternal Aunt     Breast Cancer Paternal Aunt 60        unsure of age      Social History     Socioeconomic History    Marital status:    Tobacco Use    Smoking status: Former     Current packs/day: 0.00     Average packs/day: 0.5 packs/day for 15.0 years (7.5 ttl pk-yrs)     Types: Cigarettes     Start date: 3/17/2002     Quit date: 3/17/2017     Years since quittin.3     Passive exposure: Past    Smokeless tobacco: Never   Vaping Use    Vaping status: Never Used   Substance and Sexual Activity    Alcohol use: Not Currently     Comment: SOCIALLY    Drug use: No    Sexual activity: Yes     Partners: Male   Other Topics Concern    Caffeine Concern Yes     Comment: Coffee 1 cup daily    Pt has a pacemaker No    Pt has a defibrillator No    Breast feeding No    Reaction to local anesthetic No     Social Drivers of Health     Food Insecurity: No Food Insecurity (2023)    Food Insecurity     Food Insecurity: Never true   Transportation  Needs: No Transportation Needs (2023)    Transportation Needs     Lack of Transportation: No   Stress: No Stress Concern Present (2023)    Stress     Feeling of Stress : No   Housing Stability: Low Risk  (2023)    Housing Stability     Housing Instability: No          Current Outpatient Medications   Medication Sig Dispense Refill    celecoxib 100 MG Oral Cap Take Celebrex as needed for pain up to 100 mg twice daily with food.  Do not take other NSAIDs while on this medication. 60 capsule 2    triamcinolone 0.1 % External Cream Apply 1 Application topically 2 (two) times daily. 80 g 1    Multiple Vitamins-Minerals (WOMENS MULTIVITAMIN) Oral Tab       Cholecalciferol (VITAMIN D) 1000 units Oral Tab Take 1,000 Units by mouth daily.         Allergies:   No Known Allergies    Past Medical History:    Arrhythmia    HX OF PVCS    Decorative tattoo    H/O colonoscopy with polypectomy    History of esophagogastroduodenoscopy (EGD)    HPV in female    Thyroid cyst    goiter    Varicella    Had chicken pox during childhood     Past Surgical History:   Procedure Laterality Date    Colonoscopy N/A 3/5/2025    Procedure: COLONOSCOPY;  Surgeon: Se Bullock MD;  Location: [facility] ENDOSCOPY    Colposcopy, cervix w/upper adjacent vagina; w/biopsy(s), cervix      Leep       Social History     Socioeconomic History    Marital status:      Spouse name: Not on file    Number of children: Not on file    Years of education: Not on file    Highest education level: Not on file   Occupational History    Not on file   Tobacco Use    Smoking status: Former     Current packs/day: 0.00     Average packs/day: 0.5 packs/day for 15.0 years (7.5 ttl pk-yrs)     Types: Cigarettes     Start date: 3/17/2002     Quit date: 3/17/2017     Years since quittin.3     Passive exposure: Past    Smokeless tobacco: Never   Vaping Use    Vaping status: Never Used   Substance and Sexual Activity    Alcohol use: Not Currently      Comment: SOCIALLY    Drug use: No    Sexual activity: Yes     Partners: Male   Other Topics Concern     Service Not Asked    Blood Transfusions Not Asked    Caffeine Concern Yes     Comment: Coffee 1 cup daily    Occupational Exposure Not Asked    Hobby Hazards Not Asked    Sleep Concern Not Asked    Stress Concern Not Asked    Weight Concern Not Asked    Special Diet Not Asked    Back Care Not Asked    Exercise Not Asked    Bike Helmet Not Asked    Seat Belt Not Asked    Self-Exams Not Asked    Grew up on a farm Not Asked    History of tanning Not Asked    Outdoor occupation Not Asked    Pt has a pacemaker No    Pt has a defibrillator No    Breast feeding No    Reaction to local anesthetic No   Social History Narrative    Not on file     Social Drivers of Health     Food Insecurity: No Food Insecurity (5/28/2023)    Food Insecurity     Food Insecurity: Never true   Transportation Needs: No Transportation Needs (5/28/2023)    Transportation Needs     Lack of Transportation: No   Stress: No Stress Concern Present (5/28/2023)    Stress     Feeling of Stress : No   Housing Stability: Low Risk  (5/28/2023)    Housing Stability     Housing Instability: No     Housing Instability Emergency: Not on file     Crib or Bassinette: Not on file     Family History   Problem Relation Age of Onset    Other (alzheimers) Mother     Prostate Cancer Father     Colon Cancer Maternal Aunt     Breast Cancer Paternal Aunt 60        unsure of age       There were no vitals filed for this visit.    HPI:  Chief Complaint   Patient presents with    Skin Biopsy     LOV 5/2025. Pt present for f/u on rash to b/l arms that has not gotten away. Notes biopsy was pended if rash did not improve. She has continued use of triamcinolone sparingly.        routine follow-up concerned above  Personal history of skin cancer: No  Personal history of AK's: No  Personal history of dysplastic nevi: No  Family history of skin cancer: No    History of  Present Illness  Renetta Milian is a 39 year old female who presents with a rash on her forearms.    The rash on her forearms began on Saturday, appearing as a 'perfect little ring' with a normal center and a red, dry-looking outer edge. It is more prominent in sunlight but is not itchy. No other rashy areas have been noted on her body. The rash has not worsened since Sunday.    She mentions experiencing a possible breathing reaction to straw while gardening, though she is unsure if it is related to the rash. She has been experiencing premature ventricular contractions (PVCs) intermittently for about a year.    Recent laboratory tests were normal, except for an elevated rheumatoid arthritis factor. She is scheduled to see a rheumatologist next month for further evaluation of her symptoms.    No headaches, joint pain, or other systemic symptoms occur when exposed to the sun.      Renetta Milian is a 39 year old female who presents with persistent skin lesions.    She has persistent skin lesions that have not changed in number or appearance. They are primarily located in the same areas as before, with some smaller, less pronounced ones appearing in areas with tattoos. These are described as 'more flaky looking' and 'lighter'. The lesions are not itchy and do not bother her, but they are noticeable.    The lesions become more apparent if she does not apply lotion regularly. She has been diligent about using lotion, which may have helped in reducing their visibility. The lesions are described as having a 'little brown edge' and resembling 'little craters'.    She is concerned about her keratosis pilaris worsening, noting it has become 'super red'. She has been using CeraVe in an attempt to manage it. She wonders if there is a connection between the keratosis pilaris and the current skin lesions, although she feels they might be independent issues.    She has not noticed any lesions on her legs, which do not receive  much sun exposure. She describes the lesions as sometimes being mistaken for freckles or gradients.    No itchiness associated with the lesions.      Patient presents with concerns above.    Patient has been in their usual state of health.     Past notes/ records and appropriate/relevant lab results including pathology and past body maps reviewed. Including outside notes/ PCP notes as appropriate. Updated and new information noted in current visit.     ROS:  Denies other relevant systemic complaints.      History, medications, allergies reviewed as noted.       Physical Examination:     Well-developed well-nourished patient alert oriented in no acute distress.  Exam performed, including scalp, head, neck, face,nails, hair, external eyes, including conjunctival mucosa, eyelids, lips external ears , arms, digits,palms.     Multiple light to medium brown, well marginated, uniformly pigmented, macules and papules 6 mm and less scattered on exam. pigmented lesions examined with dermoscopy benign-appearing patterns.     Waxy tannish keratotic papules scattered, cherry-red vascular papules scattered.    See map today's date for lesions noted .  See assessment and plan below for specific lesions.    Otherwise remarkable for lesions as noted on map.    See A/P  below for additional information:    Assessment / plan:    Orders Placed This Encounter   Procedures    Specimen to Pathology, Tissue [[facility] Pt to [facility] lab]       Meds & Refills for this Visit:  Requested Prescriptions      No prescriptions requested or ordered in this encounter         Encounter Diagnoses   Name Primary?    Neoplasm of uncertain behavior of skin Yes    Rash and other nonspecific skin eruption              Physical Exam  SKIN: Blue clear lesion, lighter scaly area, small scaly lesion, and small lesion with brown edge on skin.    Results  LABS  Rheumatoid factor: elevated  C-reactive protein (CRP): elevated  Antinuclear antibody (MILAGROS):  negative          Assessment & Plan  Differential diagnosis of rash includes SCLE, lichenoid keratoses,  The rash on the forearms, characterized by a red, dry edge and normal center, is suggestive of SCLE, especially given its prominence in sunlight. An elevated rheumatoid factor, may be associated with autoantibodies. Further antibody testing is planned to confirm the diagnosis. The rash does not currently warrant a biopsy as it is fading, but this may be reconsidered if the rash persists or recurs.  - Order Sjogren's antibodies test  - Advise use of sunscreen to prevent further sun-induced rashes  - Consider biopsy if rash persists or recurs    Possible connective tissue disease  Symptoms suggestive of a connective tissue disease, including a rash on the forearms that appears as a perfect ring with a red, dry edge and normal center, more prominent in sunlight. She has an elevated rheumatoid factor and is experiencing other systemic symptoms. Differential diagnosis includes SCLE and other connective tissue disorders. Recent labs have been normal, but further testing is warranted to explore the possibility of connective tissue disease. The connective tissue screen done by primary care may not be as comprehensive as those performed by a rheumatologist.  - Order Sjogren's antibodies test  - Advise use of sunscreen to prevent sun-induced rash    Elevated rheumatoid factor  Elevated rheumatoid factor could indicate an underlying autoimmune condition. She has been experiencing various symptoms, including a rash on the forearms more prominent in sunlight. The elevated rheumatoid factor may contribute to increased sun sensitivity. She is scheduled to see a rheumatologist next month for further evaluation. The possibility of autoimmune conditions such as Sjogren's syndrome is considered, as these can cause sun sensitivity and other systemic symptoms.  - Order Sjogren's antibodies test  - Advise use of sunscreen to prevent  sun-induced rash  Follow-up if needed, symptomatic      Suspect disseminated superficial actinic porokeratosis  Presence of lighter, flaky, scaly lesions primarily on sun-exposed areas. Lesions are noticeable but not pruritic or bothersome. Lesions are independent of keratosis pilaris, which is also present.  - Perform skin biopsy to confirm diagnosis of disseminated superficial actinic porokeratosis.    Recording duration: 7 minutes                         Right arm: patient with new onset of multiple recurrent crops of flesh colored to whitish papules atrophic with thin plate like scale, r/o DSAP, vs SCLE vs other -   Punch biopsy performed, operative note and consent in chart.  Postoperative instructions given.  Instructions reviewed with patient.  Will followup in  7-10  days for suture removal.  Further plans pending pathology.      Rash  Patient's photos lesions resolved at visit    New areas of acne on face forehead, jaw line  Acne. See medications in grid.  Skin care regimen discussed at length including cleansers, makeup, face washing, sunscreen.  Recheck in 6 -8 weeks if no improvement.  Notify us promptly if problems tolerating regimen.  Consider more aggressive therapy if not responding.  Tretinoin nightly increasing to nightly as tolerated  Retinoids: Application instructions reviewed gentle cleanser, over moisturizer.  Hyaluronic acid continue moisturizers may be helpful at reducing irritation.  Start 2-3 times weekly slowly titrate up, increase to nightly as tolerated May need to decrease use with cold weather.    Multiple benign keratoses fibrous papules, benign nevi reassurance  Lesion left breast waxy tan papule consistent benign seborrheic keratosis,     small cyst at inframammary crease, monitor presently consider referral for excision if cyst size become more bothersome   scattered nevi over the back, benign-appearing  Mild parietal scalp intradermal nevus no atypia 8/22    Numerous  benign-appearing nevi  No atypical appearing lesions.  Scattered nevi.    Waxy tan papule right periauricular cheek observe.  Papule left jawline observe.  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  Moderate sun damage.  Continue sunscreen sun protection    Please refer to map for specific lesions.  See additional diagnoses.  Pros cons of various therapies, risks benefits discussed.Pathophysiology discussed with patient.  Therapeutic options reviewed.  See  Medications in grid.  Instructions reviewed at length.    Benign nevi, seborrheic  keratoses, cherry angiomas:  Reassurance regarding other benign skin lesions.    Monitor for new or changing lesions. Signs and symptoms of skin cancer, ABCDE's of melanoma ( additional information available at AAD.org, skincancer.org) Encourage Sunscreen (broad-spectrum, ideally mineral-based-UVA/UVB -SPF 30 or higher) use encouraged, sun protection/sun protective clothing, self exams reviewed Followup as noted RTC ---routine checkup 6 mos -one year or p.r.n.    Encounter Times   Including precharting, reviewing chart, prior notes obtaining history: 10 minutes, medical exam :10 minutes, notes on body map, plan, counseling 10minutes My total time spent caring for the patient on the day of the encounter: 30 minutes     The patient indicates understanding of these issues and agrees to the plan.  The patient is asked to return as noted in follow-up/ above.    This note was generated using Dragon voice recognition software.  Please contact me regarding any confusion resulting from errors in recognition..  Note to patient and family: The 21st Century Cures Act makes medical notes like these available to patients. However, be advised this is a medical document. It is intended as [id number] communication and monitoring of a patient's care needs. It is written in medical language and may contain abbreviations or  verbiage that are unfamiliar. It may appear blunt or direct. Medical documents are intended to carry relevant information, facts as evident and the clinical opinion of the practitioner.

## 2025-07-21 NOTE — PROGRESS NOTES
Operative Report                                                                      Punch Biopsy      Clinical diagnosis:  Size of lesion:   Location: Right arm: patient with new onset of multiple recurrent crops of flesh colored to whitish papules atrophic with thin plate like scale, r/o DSAP, vs SCLE vs other -     Punch size: 3mm  Suture:4-0 vicryl rapide- dissolving no need for suture removal appointment. Expectations, care, instructions reviewed.     Suture removal planned in 7-14 days      Procedure:    With patient in appropriate position the skin of the above was scrubbed with alcohol.  Anesthesia was obtained with 1% Xylocaine with epinephrine.  A circular punch was used to incise the lesion.  The biopsy specimen was elevated from its base and transected.    The specimen was sent for histopathologic exam.  Hemostasis was obtained with suture noted above.    Estimated blood loss less than 2 cc.    Biopsy dressed with Steri-Strips, bandage/other    Pressure dressing: Applied as appropriate    Complications: None    Written instructions given and reviewed with patient    Await pathology    Contact information reviewed.    Procedural physician:  Marion Hilton MD

## 2025-07-22 ENCOUNTER — RESULTS FOLLOW-UP (OUTPATIENT)
Dept: DERMATOLOGY CLINIC | Facility: CLINIC | Age: 40
End: 2025-07-22

## 2025-07-22 RX ORDER — TAZAROTENE 1 MG/G
1 CREAM TOPICAL NIGHTLY
Qty: 60 G | Refills: 3 | Status: SHIPPED | OUTPATIENT
Start: 2025-07-22

## 2025-07-22 NOTE — PROGRESS NOTES
The pathology report from last visit showed  Right arm biopsy shows lesion of porokeratosis.  These are benign growths.  Many are more hereditary and will likely continue to develop.  They are triggered by sun and hereditary susceptibility.  Alphahydroxy acids and vitamin A based medications like tretinoin or Tazorac can be helpful.  Please log in test results.  Please call patient and inform of results and recommendations.  (Added to history).  Pt to  rtc  6 mos or prn.

## 2025-07-22 NOTE — PROGRESS NOTES
Patient informed of test results and all KMT's recommendations. Voiced understanding. Pt would like on of the topicals Dr. Hilton either tretinoin or tazorac please

## 2025-08-21 ENCOUNTER — LAB ENCOUNTER (OUTPATIENT)
Dept: LAB | Age: 40
End: 2025-08-21
Attending: INTERNAL MEDICINE

## 2025-08-21 ENCOUNTER — OFFICE VISIT (OUTPATIENT)
Dept: INTERNAL MEDICINE CLINIC | Facility: CLINIC | Age: 40
End: 2025-08-21

## 2025-08-21 VITALS
BODY MASS INDEX: 34.69 KG/M2 | WEIGHT: 221 LBS | SYSTOLIC BLOOD PRESSURE: 135 MMHG | HEART RATE: 81 BPM | HEIGHT: 67 IN | DIASTOLIC BLOOD PRESSURE: 77 MMHG

## 2025-08-21 DIAGNOSIS — E04.1 THYROID CYST: ICD-10-CM

## 2025-08-21 DIAGNOSIS — M25.59 PAIN IN OTHER JOINT: ICD-10-CM

## 2025-08-21 DIAGNOSIS — N63.22 MASS OF UPPER INNER QUADRANT OF LEFT BREAST: ICD-10-CM

## 2025-08-21 DIAGNOSIS — Z00.00 ANNUAL PHYSICAL EXAM: Primary | ICD-10-CM

## 2025-08-21 DIAGNOSIS — E55.9 VITAMIN D DEFICIENCY: ICD-10-CM

## 2025-08-21 DIAGNOSIS — Z00.00 ANNUAL PHYSICAL EXAM: ICD-10-CM

## 2025-08-21 LAB
ALBUMIN SERPL-MCNC: 4.6 G/DL (ref 3.2–4.8)
ALBUMIN/GLOB SERPL: 1.6 (ref 1–2)
ALP LIVER SERPL-CCNC: 57 U/L (ref 37–98)
ALT SERPL-CCNC: 14 U/L (ref 10–49)
ANION GAP SERPL CALC-SCNC: 9 MMOL/L (ref 0–18)
AST SERPL-CCNC: 13 U/L (ref ?–34)
BASOPHILS # BLD AUTO: 0.05 X10(3) UL (ref 0–0.2)
BASOPHILS NFR BLD AUTO: 0.7 %
BILIRUB SERPL-MCNC: 0.8 MG/DL (ref 0.3–1.2)
BUN BLD-MCNC: 14 MG/DL (ref 9–23)
BUN/CREAT SERPL: 17.1 (ref 10–20)
CALCIUM BLD-MCNC: 9.4 MG/DL (ref 8.7–10.4)
CHLORIDE SERPL-SCNC: 105 MMOL/L (ref 98–112)
CHOLEST SERPL-MCNC: 173 MG/DL (ref ?–200)
CO2 SERPL-SCNC: 24 MMOL/L (ref 21–32)
CREAT BLD-MCNC: 0.82 MG/DL (ref 0.55–1.02)
DEPRECATED RDW RBC AUTO: 40.8 FL (ref 35.1–46.3)
EGFRCR SERPLBLD CKD-EPI 2021: 93 ML/MIN/1.73M2 (ref 60–?)
EOSINOPHIL # BLD AUTO: 0.05 X10(3) UL (ref 0–0.7)
EOSINOPHIL NFR BLD AUTO: 0.7 %
ERYTHROCYTE [DISTWIDTH] IN BLOOD BY AUTOMATED COUNT: 12.6 % (ref 11–15)
FASTING PATIENT LIPID ANSWER: NO
FASTING STATUS PATIENT QL REPORTED: NO
GLOBULIN PLAS-MCNC: 2.9 G/DL (ref 2–3.5)
GLUCOSE BLD-MCNC: 92 MG/DL (ref 70–99)
HCT VFR BLD AUTO: 41.1 % (ref 35–48)
HDLC SERPL-MCNC: 60 MG/DL (ref 40–59)
HGB BLD-MCNC: 14.1 G/DL (ref 12–16)
IMM GRANULOCYTES # BLD AUTO: 0.01 X10(3) UL (ref 0–1)
IMM GRANULOCYTES NFR BLD: 0.1 %
LDLC SERPL CALC-MCNC: 98 MG/DL (ref ?–100)
LYMPHOCYTES # BLD AUTO: 2.39 X10(3) UL (ref 1–4)
LYMPHOCYTES NFR BLD AUTO: 34.2 %
MCH RBC QN AUTO: 30.4 PG (ref 26–34)
MCHC RBC AUTO-ENTMCNC: 34.3 G/DL (ref 31–37)
MCV RBC AUTO: 88.6 FL (ref 80–100)
MONOCYTES # BLD AUTO: 0.55 X10(3) UL (ref 0.1–1)
MONOCYTES NFR BLD AUTO: 7.9 %
NEUTROPHILS # BLD AUTO: 3.94 X10 (3) UL (ref 1.5–7.7)
NEUTROPHILS # BLD AUTO: 3.94 X10(3) UL (ref 1.5–7.7)
NEUTROPHILS NFR BLD AUTO: 56.4 %
NONHDLC SERPL-MCNC: 113 MG/DL (ref ?–130)
OSMOLALITY SERPL CALC.SUM OF ELEC: 286 MOSM/KG (ref 275–295)
PLATELET # BLD AUTO: 285 10(3)UL (ref 150–450)
POTASSIUM SERPL-SCNC: 4.8 MMOL/L (ref 3.5–5.1)
PROT SERPL-MCNC: 7.5 G/DL (ref 5.7–8.2)
RBC # BLD AUTO: 4.64 X10(6)UL (ref 3.8–5.3)
SODIUM SERPL-SCNC: 138 MMOL/L (ref 136–145)
TRIGL SERPL-MCNC: 78 MG/DL (ref 30–149)
TSI SER-ACNC: 0.87 UIU/ML (ref 0.55–4.78)
URATE SERPL-MCNC: 5.1 MG/DL (ref 3.1–7.8)
VIT D+METAB SERPL-MCNC: 28.3 NG/ML (ref 30–100)
VLDLC SERPL CALC-MCNC: 13 MG/DL (ref 0–30)
WBC # BLD AUTO: 7 X10(3) UL (ref 4–11)

## 2025-08-21 PROCEDURE — 85025 COMPLETE CBC W/AUTO DIFF WBC: CPT

## 2025-08-21 PROCEDURE — 82306 VITAMIN D 25 HYDROXY: CPT

## 2025-08-21 PROCEDURE — 84550 ASSAY OF BLOOD/URIC ACID: CPT

## 2025-08-21 PROCEDURE — 84443 ASSAY THYROID STIM HORMONE: CPT

## 2025-08-21 PROCEDURE — 80053 COMPREHEN METABOLIC PANEL: CPT

## 2025-08-21 PROCEDURE — 80061 LIPID PANEL: CPT

## 2025-08-21 PROCEDURE — 36415 COLL VENOUS BLD VENIPUNCTURE: CPT

## 2025-08-21 RX ORDER — CELECOXIB 100 MG/1
CAPSULE ORAL
Qty: 60 CAPSULE | Refills: 2 | Status: SHIPPED | OUTPATIENT
Start: 2025-08-21

## 2025-08-24 ENCOUNTER — RESULTS FOLLOW-UP (OUTPATIENT)
Dept: LAB | Age: 40
End: 2025-08-24

## 2025-08-29 ENCOUNTER — HOSPITAL ENCOUNTER (OUTPATIENT)
Dept: ULTRASOUND IMAGING | Facility: HOSPITAL | Age: 40
Discharge: HOME | End: 2025-08-29
Attending: INTERNAL MEDICINE

## 2025-08-29 ENCOUNTER — PATIENT MESSAGE (OUTPATIENT)
Dept: INTERNAL MEDICINE CLINIC | Facility: CLINIC | Age: 40
End: 2025-08-29

## 2025-08-29 DIAGNOSIS — M25.59 PAIN IN OTHER JOINT: Primary | ICD-10-CM

## 2025-08-29 DIAGNOSIS — E04.1 THYROID CYST: ICD-10-CM

## 2025-08-29 PROCEDURE — 76536 US EXAM OF HEAD AND NECK: CPT | Performed by: INTERNAL MEDICINE

## 2025-08-30 ENCOUNTER — LAB ENCOUNTER (OUTPATIENT)
Dept: LAB | Age: 40
End: 2025-08-30
Attending: INTERNAL MEDICINE

## 2025-08-30 DIAGNOSIS — M25.59 PAIN IN OTHER JOINT: ICD-10-CM

## 2025-08-30 PROCEDURE — 36415 COLL VENOUS BLD VENIPUNCTURE: CPT

## 2025-08-30 PROCEDURE — 86618 LYME DISEASE ANTIBODY: CPT

## 2025-09-01 LAB — B BURGDOR IGG+IGM SER QL: NEGATIVE

## (undated) DIAGNOSIS — E01.0 THYROMEGALY: Primary | ICD-10-CM

## (undated) DEVICE — Device

## (undated) DEVICE — 60 ML SYRINGE REGULAR TIP: Brand: MONOJECT

## (undated) DEVICE — V2 SPECIMEN COLLECTION TRAY: Brand: NEPTUNE

## (undated) DEVICE — V2 SPECIMEN COLLECTION MANIFOLD KIT: Brand: NEPTUNE

## (undated) DEVICE — MEDI-VAC NON-CONDUCTIVE SUCTION TUBING 6MM X 1.8M (6FT.) L: Brand: CARDINAL HEALTH

## (undated) DEVICE — MEDI-VAC NON-CONDUCTIVE SUCTION TUBING: Brand: CARDINAL HEALTH

## (undated) DEVICE — KIT CLEAN ENDOKIT 1.1OZ GOWNX2

## (undated) DEVICE — YANKAUER,BULB TIP,W/O VENT,RIGID,STERILE: Brand: MEDLINE

## (undated) DEVICE — KIT ENDO ORCAPOD 160/180/190

## (undated) DEVICE — CONMED SCOPE SAVER BITE BLOCK, 20X27 MM: Brand: SCOPE SAVER

## (undated) DEVICE — GIJAW SINGLE-USE BIOPSY FORCEPS WITH NEEDLE: Brand: GIJAW

## (undated) NOTE — LETTER
[facility]  [address], [place], [place]    Authorization for Surgical Operation and Procedure                               I hereby authorize Se Bullock MD, my physician and his/her assistants (if applicable), which may include medical students, residents, and/or fellows, to perform the following surgical operation/ procedure and administer such anesthesia as may be determined necessary by my physician: Operation/Procedure name (s) COLONOSCOPY \ ESOPHAGOGASTRODUODENOSCOPY on Renetta Milian   2.   I recognize that during the surgical operation/procedure, unforeseen conditions may necessitate additional or different procedures than those listed above.  I, therefore, further authorize and request that the above-named surgeon, assistants, or designees perform such procedures as are, in their judgment, necessary and desirable.    3.   My surgeon/physician has discussed prior to my surgery the potential benefits, risks and side effects of this procedure; the likelihood of achieving goals; and potential problems that might occur during recuperation.  They also discussed reasonable alternatives to the procedure, including risks, benefits, and side effects related to the alternatives and risks related to not receiving this procedure.  I have had all my questions answered and I acknowledge that no guarantee has been made as to the result that may be obtained.    4.   Should the need arise during my operation/procedure, which includes change of level of care prior to discharge, I also consent to the administration of blood and/or blood products.  Further, I understand that despite careful testing and screening of blood or blood products by collecting agencies, I may still be subject to ill effects as a result of receiving a blood transfusion and/or blood products.  The following are some, but not all, of the potential risks that can occur: fever and allergic reactions, hemolytic reactions,  transmission of diseases such as Hepatitis, AIDS and Cytomegalovirus (CMV) and fluid overload.  In the event that I wish to have an autologous transfusion of my own blood, or a directed donor transfusion, I will discuss this with my physician.  Check only if Refusing Blood or Blood Products  I understand refusal of blood or blood products as deemed necessary by my physician may have serious consequences to my condition to include possible death. I hereby assume responsibility for my refusal and release the hospital, its personnel, and my physicians from any responsibility for the consequences of my refusal.    o  Refuse   5.   I authorize the use of any specimen, organs, tissues, body parts or foreign objects that may be removed from my body during the operation/procedure for diagnosis, research or teaching purposes and their subsequent disposal by hospital authorities.  I also authorize the release of specimen test results and/or written reports to my treating physician on the hospital medical staff or other referring or consulting physicians involved in my care, at the discretion of the Pathologist or my treating physician.    6.   I consent to the photographing or videotaping of the operations or procedures to be performed, including appropriate portions of my body for medical, scientific, or educational purposes, provided my identity is not revealed by the pictures or by descriptive texts accompanying them.  If the procedure has been photographed/videotaped, the surgeon will obtain the original picture, image, videotape or CD.  The hospital will not be responsible for storage, release or maintenance of the picture, image, tape or CD.    7.   I consent to the presence of a  or observers in the operating room as deemed necessary by my physician or their designees.    8.   I recognize that in the event my procedure results in extended X-Ray/fluoroscopy time, I may develop a skin reaction.    9. If  I have a Do Not Attempt Resuscitation (DNAR) order in place, that status will be suspended while in the operating room, procedural suite, and during the recovery period unless otherwise explicitly stated by me (or a person authorized to consent on my behalf). The surgeon or my attending physician will determine when the applicable recovery period ends for purposes of reinstating the DNAR order.  10. Patients having a sterilization procedure: I understand that if the procedure is successful the results will be permanent and it will therefore be impossible for me to inseminate, conceive, or bear children.  I also understand that the procedure is intended to result in sterility, although the result has not been guaranteed.   11. I acknowledge that my physician has explained sedation/analgesia administration to me including the risk and benefits I consent to the administration of sedation/analgesia as may be necessary or desirable in the judgment of my physician.    I CERTIFY THAT I HAVE READ AND FULLY UNDERSTAND THE ABOVE CONSENT TO OPERATION and/or OTHER PROCEDURE.     ____________________________________  _________________________________        ______________________________  Signature of Patient    Signature of Responsible Person                Printed Name of Responsible Person                                      ____________________________________  _____________________________                ________________________________  Signature of Witness        Date  Time         Relationship to Patient    STATEMENT OF PHYSICIAN My signature below affirms that prior to the time of the procedure; I have explained to the patient and/or his/her legal representative, the risks and benefits involved in the proposed treatment and any reasonable alternative to the proposed treatment. I have also explained the risks and benefits involved in refusal of the proposed treatment and alternatives to the proposed treatment and have  answered the patient's questions. If I have a significant financial interest in a co-management agreement or a significant financial interest in any product or implant, or other significant relationship used in this procedure/surgery, I have disclosed this and had a discussion with my patient.     _____________________________________________________              _____________________________  (Signature of Physician)                                                                                         (Date)                                   (Time)  Patient Name: Renetta GODINEZ Maicol      : 1985      Printed: 2025     Medical Record #: [id number]                                      Page 1 of 1

## (undated) NOTE — IP AVS SNAPSHOT
Patient Demographics     Address Phone E-mail Address    [address] [place] [address] Sharon Delcid [phone] (Home) *Preferred* [email or web address]      Emergency Contact(s)     Name Relation Home Work Megan Wick 668 [phone] Future Appointments        Provider Ethan Ratliff    5/22/2017 9:00 AM Angélica Garcia [facility] Maternal Fetal Medicine [place]    5/22/2017 9:50 AM Nakul Nino MD [facility] for [place], [address] [facility]    6/20/20

## (undated) NOTE — LETTER
VACCINE ADMINISTRATION RECORD  PARENT / GUARDIAN APPROVAL  Date: 2023  Vaccine administered to: Ryan Rivera     : 1985    MRN: [id number]    A copy of the appropriate Centers for Disease Control and Prevention Vaccine Information statement has been provided. I have read or have had explained the information about the diseases and the vaccines listed below. There was an opportunity to ask questions and any questions were answered satisfactorily. I believe that I understand the benefits and risks of the vaccine cited and ask that the vaccine(s) listed below be given to me or to the person named above (for whom I am authorized to make this request). VACCINES ADMINISTERED:  Tdap    I have read and hereby agree to be bound by the terms of this agreement as stated above. My signature is valid until revoked by me in writing. This document is signed by Ryan Rivera , relationship: Self on 2023.:                                                                                                                                         Parent / Guardian Signature                                                Date    Olivia Newell RN served as a witness to authentication that the identity of the person signing electronically is in fact the person represented as signing.

## (undated) NOTE — ED AVS SNAPSHOT
Noemy Wallace   MRN: [id number]    Department:  [facility] Emergency Department   Date of Visit:  9/10/2019           Disclosure     Insurance plans vary and the physician(s) referred by the ER may not be covered by your plan.  Please contact CARE PHYSICIAN AT ONCE OR RETURN IMMEDIATELY TO THE EMERGENCY DEPARTMENT. If you have been prescribed any medication(s), please fill your prescription right away and begin taking the medication(s) as directed.   If you believe that any of the medications

## (undated) NOTE — Clinical Note
Continue care with Dr. Dhillon Fee Follow up in 3-4 weeks for a transvaginal cervical length measurement Repeat transvaginal CL at ~20-22 weeks.  This may be done with her routine anatomy ultrasound and we would be happy to perform the screening US with her 2

## (undated) NOTE — LETTER
10/30/2023              [address] [place], [place] [id number]         To whom it may concern,     Greentree Kan. Maicol  1985 is exclusively breastfeeding her . Please excuse her from jury duty at this time.        Sincerely,    Tonja Mcclelland MD  [facility], Pablo Galvin, [facility] - OB[facility]N  [address]  [phone]

## (undated) NOTE — LETTER
VACCINE ADMINISTRATION RECORD  PARENT / GUARDIAN APPROVAL  Date: 2020  Vaccine administered to: Carmel Adan     : 1985    MRN: [id number]    A copy of the appropriate Centers for Disease Control and Prevention Vaccine Information statement

## (undated) NOTE — IP AVS SNAPSHOT
[address]  [address] [facility] [phone]                Discharge Summary   5/22/2017    [facility]           Admission Information        Provider Department    5/22/2017 Kristin Montes De Oca MD [facility] Maternal [address]   [phone]              Discharge Orders     Future Labs/Procedures Expected by Expires    [address] PG UTERUS TRANSVAGINAL(CPT=76817)  5/22/2017 (Approximate) 5/22/2018    Scheduling Instructions:    T 27 (03/18/17)  10 (03/18/17)  1 (03/18/17)  1 -- (03/18/17)  3.9 (03/18/17)  1.7 (03/18/17)  0.6 (03/18/17)  0.1 (03/18/17)  0.0      Radiology Exams     None         Additional Information       We are concerned for your overall well being:    - If you ar review your medications with you before you are discharged, and can provide you with additional printed information. Not all patients will experience these side effects or respond to medications the same.  Please call your provider or healthcare team if you

## (undated) NOTE — LETTER
Internal Prescription ID: [id number]      2023         Name:  Casandra Yanez  MRN:  [id number]   Address:  [address] [place] [address] :  1985      Rx: Misc. Devices (BREAST PUMP) Does not apply Misc    Start Date: 2023   End Date:         Qty Disp:   *1 (One) each*   Sig:           Double Electric breast Pump ()   Refills:       *0 (Zero)*   SOL:        No       Comments:  Dr. Baird Bookbinder  NPI: [id number]  Dx: Lactating Mother       Electronically Signed By: Nini Kamara MD   This is an original prescription. This is an original prescription. This is an original prescription. This is an original prescription. This is an or*  This is an original prescription. This is an original prescription. This is an original prescription. This is an original prescription. This is an or*  This is an original prescription. This is an original prescription. This is an original prescription. This is an original prescription. This is an or*  This is an original prescription. This is an original prescription. This is an original prescription. This is an original prescription. This is an or*  This is an original prescription. This is an original prescription. This is an original prescription. This is an original prescription. This is an or*       Security features: (*) bordered and spelled quantities, (*) bordered refill quantities, microprint signature line visible at 5X or > magnification that must show \"THIS IS AN ORIGINAL PRESCRIPTION\" & this description of features.

## (undated) NOTE — Clinical Note
Normal NT ultrasoundPlacental chorioangioma suspectedHistory of LEEP but subsequent term delivery.   She is at low risk for cervical insufficiencyRECOMMENDATIONS:Continue care with Dr. Louis Lockwood results in the first trimester screenLevel 2 ultrasound at 21 w

## (undated) NOTE — IP AVS SNAPSHOT
Patient Demographics     Address Phone E-mail Address    [address] [phone] (Home) *Preferred* [email or web address]      Emergency Contact(s)     Name Relation Home Work [facility] Spouse 21  Future Appointments        Provider Ethan Ratliff    5/22/2017 9:50 AM Jagdish Adams MD [facility] for [address]    6/12/2017 8:00 AM [address] [place] [address] Maternal Fetal Medicine [address]    6/20/20

## (undated) NOTE — LETTER
[facility]  Administration of Anesthesia  I, Renetta Milian agree to be cared for by a physician anesthesiologist alone and/or with a nurse anesthetist, who is specially trained to monitor me and give me medicine to put me to sleep or keep me comfortable during my procedure    I understand that my anesthesiologist and/or anesthetist is not an employee or agent of [facility] or [employer]. He or she works for [facility]    As the patient asking for anesthesia services, I agree to:  Allow the anesthesiologist (anesthesia doctor) to give me medicine and do additional procedures as necessary. Some examples are: Starting or using an “IV” to give me medicine, fluids or blood during my procedure, and having a breathing tube placed to help me breathe when I’m asleep (intubation). In the event that my heart stops working properly, I understand that my anesthesiologist will make every effort to sustain my life, unless otherwise directed by [facility] Do Not Resuscitate documents.  Tell my anesthesia doctor before my procedure:  If I am pregnant.  The last time that I ate or drank.  iii. All of the medicines I take (including prescriptions, herbal supplements, and pills I can buy without a prescription (including street drugs/illegal medications). Failure to inform my anesthesiologist about these medicines may increase my risk of anesthetic complications.  iv.If I am allergic to anything or have had a reaction to anesthesia before.  I understand how the anesthesia medicine will help me (benefits).  I understand that with any type of anesthesia medicine there are risks:  The most common risks are: nausea, vomiting, sore throat, muscle soreness, damage to my eyes, mouth, or teeth (from breathing tube placement).  Rare risks include: remembering what happened during my procedure, allergic reactions to medications, injury to my airway, heart, lungs, vision, nerves, or  muscles and in extremely rare instances death.  My doctor has explained to me other choices available to me for my care (alternatives).  Pregnant Patients (“epidural”):  I understand that the risks of having an epidural (medicine given into my back to help control pain during labor), include itching, low blood pressure, difficulty urinating, headache or slowing of the baby’s heart. Very rare risks include infection, bleeding, seizure, irregular heart rhythms and nerve injury.  Regional Anesthesia (“spinal”, “epidural”, & “nerve blocks”):  I understand that rare but potential complications include headache, bleeding, infection, seizure, irregular heart rhythms, and nerve injury.    _____________________________________________________________________________  Patient (or Representative) Signature/Relationship to Patient  Date   Time    _____________________________________________________________________________   Name (if used)    Language/Organization   Time    _____________________________________________________________________________  Nurse Anesthetist Signature     Date   Time  _____________________________________________________________________________  Anesthesiologist Signature     Date   Time  I have discussed the procedure and information above with the patient (or patient’s representative) and answered their questions. The patient or their representative has agreed to have anesthesia services.    _____________________________________________________________________________  Witness        Date   Time  I have verified that the signature is that of the patient or patient’s representative, and that it was signed before the procedure  Patient Name: Renetta Clarkeambarlay     : 1985                 Printed: 2025 at 9:07 AM    Medical Record #: [id number]                                            Page 1 of 1  ----------ANESTHESIA CONSENT----------

## (undated) NOTE — IP AVS SNAPSHOT
[address] ~ [phone]                Discharge Summary   6/12/2017    Mio Self           Admission Information        Provider Department    6/12/2017 Tee Plasencia MD [facility] Mater to 6pm and on Saturday between 8am and 1pm. Evening and weekend appointments for your exam are available. Walk-in patients are welcome for most exams.      [facility]/Jean and [address] [place] [address] Par - If you have concerns related to behavioral health issues or thoughts of harming yourself, contact [address] [place] [address] at [phone].     - If you don’t have insurance, Martita Erickson has partnered with Patient [address] Vitamin D3 2000 units Oral Cap

## (undated) NOTE — MR AVS SNAPSHOT
After Visit Summary   7/2/2020    Anabela John    MRN: [id number]           Visit Information     Date & Time  7/2/2020  6:20 PM Provider  Angely Desai, [address] [place] [address], [address] [place] [address],[address], [facility]/[employer]  Phone  3 will help us do so. For more information on [employer], please visit [email or web address]                   DO YOU KNOW WHERE TO GO? Injury & Illness are never convenient.  If you are dealing with a   non-emergency, consider your o available at [facility],   visit [email or web address] or call [phone]. MY.CHRIS. ([phone])

## (undated) NOTE — LETTER
03/13/20    Al Wade  [address] [place] [address] [address] [address]      Dear Sancho Espino,    [facility] records indicate that you have outstanding lab work and or testing that was ordered for you and has not yet been completed:  Orders Placed This Encounter      TSH TIMOTHY R

## (undated) NOTE — LETTER
VACCINE ADMINISTRATION RECORD  PARENT / GUARDIAN APPROVAL  Date: 2017  Vaccine administered to: Homa Wilmore     : 1985    MRN: [id number]    A copy of the appropriate Centers for Disease Control and Prevention Vaccine Information statement

## (undated) NOTE — ED AVS SNAPSHOT
Cherri Lovell   MRN: [id number]    Department:  [facility] Emergency Department   Date of Visit:  4/2/2018           Disclosure     Insurance plans vary and the physician(s) referred by the ER may not be covered by your plan.  Please contact y CARE PHYSICIAN AT ONCE OR RETURN IMMEDIATELY TO THE EMERGENCY DEPARTMENT. If you have been prescribed any medication(s), please fill your prescription right away and begin taking the medication(s) as directed.   If you believe that any of the medications

## (undated) NOTE — IP AVS SNAPSHOT
[address]  602 [facility], [facility] ~ [phone]                Discharge Summary   4/24/2017    Jose Obrien           Admission Information        Provider Department    4/24/2017 Zehra Powell MD [facility] Maternal [address]   [phone]              Discharge Orders     Future Labs/Procedures Expected by Expires    [facility] TRIM 58 Fredy Romo  GES(CPT=76813)  4/24/2017 (Approximate) 4/24/2018    Scheduling Instructions: 61 (03/18/17)  27 (03/18/17)  10 (03/18/17)  1 (03/18/17)  1 -- (03/18/17)  3.9 (03/18/17)  1.7 (03/18/17)  0.6 (03/18/17)  0.1 (03/18/17)  0.0      Radiology Exams     None         Additional Information       We are concerned for your overall well being: review your medications with you before you are discharged, and can provide you with additional printed information. Not all patients will experience these side effects or respond to medications the same.  Please call your provider or healthcare team if you